# Patient Record
Sex: FEMALE | Race: WHITE | NOT HISPANIC OR LATINO | Employment: UNEMPLOYED | URBAN - METROPOLITAN AREA
[De-identification: names, ages, dates, MRNs, and addresses within clinical notes are randomized per-mention and may not be internally consistent; named-entity substitution may affect disease eponyms.]

---

## 2017-03-08 ENCOUNTER — PATIENT MESSAGE (OUTPATIENT)
Dept: OPTOMETRY | Facility: CLINIC | Age: 42
End: 2017-03-08

## 2017-03-22 ENCOUNTER — OFFICE VISIT (OUTPATIENT)
Dept: OPTOMETRY | Facility: CLINIC | Age: 42
End: 2017-03-22
Payer: COMMERCIAL

## 2017-03-22 DIAGNOSIS — H52.13 MYOPIA, BILATERAL: Primary | ICD-10-CM

## 2017-03-22 PROCEDURE — 92014 COMPRE OPH EXAM EST PT 1/>: CPT | Mod: S$GLB,,, | Performed by: OPTOMETRIST

## 2017-03-22 PROCEDURE — 99999 PR PBB SHADOW E&M-EST. PATIENT-LVL II: CPT | Mod: PBBFAC,,, | Performed by: OPTOMETRIST

## 2017-03-22 PROCEDURE — 92015 DETERMINE REFRACTIVE STATE: CPT | Mod: S$GLB,,, | Performed by: OPTOMETRIST

## 2017-03-22 NOTE — PROGRESS NOTES
HPI     Adrienne Andrew is a/an 41 y.o. Female who returns  for continued eye   care.  She reports that she wears her glasses most of the time she does   have glasses SVL which she uses when she removes her lenses inn the   evening. She feels as if vision and comfort with Cl and glasses is still   good but needs a new prescription for her CL.Pt wishes no dilation today   if necessary she will schedule separate karl. for that.    (--)blurred vision  (--)Headaches  (--)diplopia  (--)flashes  (+)floaters only sometimes and stable for years  (--)pain  (--)Itching  (--)tearing  (--)burning  (--)Dryness  (--) OTC Drops  (--)Photophobia       Last edited by Jung Fuentes, OD on 3/22/2017 10:49 AM.     ROS     Positive for: Eyes (myopia)    Negative for: Constitutional, Gastrointestinal, Neurological, Skin,   Genitourinary, Musculoskeletal, HENT, Endocrine, Cardiovascular,   Respiratory, Psychiatric, Allergic/Imm, Heme/Lymph    Last edited by Jung Fuentes, OD on 3/22/2017 10:49 AM. (History)        Assessment /Plan     For exam results, see Encounter Report.    Myopia, bilateral  - same specs ok  -   Glasses Prescription (3/22/2017)       Sphere Cylinder   Right -1.25 Sphere   Left -1.25 Sphere       Type:  SVL    Expiration Date:  3/23/2018          -  Will change CL brand from Acuvue Oasys to Acuvue Marilu (8.4/14.0) to increase replacment compliance (she currently replaces AV Oasys on a monthly basis rather than every 2 weeks  - CLRx per below for 1 month disposal/replacement    -Advised against overnight wear, risks of overnight wear explained;     -Optive artificial tears for comfort prn;    -Optifree Puremoist solutions recommended    Contact Lens Prescription (3/22/2017)       Brand Base Curve Diameter Sphere   Right Acuvue Marilu 8.4 14.0 -1.25   Left Acuvue Marilu 8.4 14.0 -1.25       Expiration Date:  3/23/2018    Replacement:  Monthly    Solutions:  OptiFree PureMoist    Wearing Schedule:  Daily wear    Do not  sleep in contact lenses  Use Refresh Optive artificial tears with and without contact lenses for irritation/ rewetting/ dryness              Patient education; RTC in 1 year with DFE, sooner prn at [facility]

## 2017-03-22 NOTE — PATIENT INSTRUCTIONS
Recommendations for Contact Lens Wearers from the American Opt[employer] Association  1. Always wash your hands before handling contact lenses.    2. Carefully and regularly clean contact lenses, as directed by your optometrist. Rub the contact lenses with fingers and rinse thoroughly before soaking lenses overnight in sufficient multi-purpose solution to completely cover the lens.    3. Store lenses in the proper lens storage case and replace the case at a minimum of every three months. Clean the case after each use, and keep it open and dry between cleanings.    4. Use only products recommended by your optometrist to clean and disinfect your lenses. Saline solution and rewetting drops are not designed to disinfect lenses.    5. Only fresh solution should be used to clean and store contact lenses. Never re-use old solution. Contact lens solution must be changed according to the 's recommendations, even if the lenses are not used daily.    6. Always follow the recommended contact lens replacement schedule prescribed by your optometrist.    7. Remove contact lenses before swimming or entering a hot tub.    8. See your optometrist for your regularly scheduled contact lens and eye examination.    What You Need to Know About Contact Lens Hygiene & Compliance    Contact lenses are among the safest forms of vision correction when patients follow the proper care and wearing instructions provided by their eye doctor. However, when patients do not use lenses as directed, the consequences may be dangerous. In fact, contact lens wearers could be damaging their eyes by not using proper hygiene in caring for their lenses.    Contact lenses and the solutions used with them are medical devices and are regulated by the Food and Drug Administration, therefore, it is extremely important that patients maintain regular appointments to ensure they are receiving clinical guidance from their eye doctor based on individual eye  health needs.    Clean and safe handling of contact lenses is one of the most important measures contact lens wearers can take to protect their sight. Exercising optimal care and hygiene with contact lenses can keep the eyes healthy.    Do's and Don'ts      Get started off right with your contact lenses by going to a doctor who provides full-service care. Full-service care may include the following items: a thorough eye examination, an evaluation of your suitability for contact lens wear, the lenses, necessary lens care kits, individual instructions for wear and care, and follow-up visits over a specified time. The initial visit and examination can take an hour or longer. Here is a list of other specific do's and don'ts to lead you to successful wear.  Do:  Always wash your hands before handling contact lenses.    Carefully and regularly clean contact lenses, as directed by your optometrist. If recommended, rub the contact lenses with fingers and rinse thoroughly before soaking lenses overnight in sufficient multi-purpose solution to completely cover the lens.    Store lenses in the proper lens storage case and replace the case at a minimum of every three months. Clean the case after each use, and keep it open and dry between cleanings.    Only fresh solution should be used to clean and store contact lenses. Never Re-use old solution. Contact lens solution must be changed according to the 's recommendations, even if the lenses are not used daily.    Always follow the recommended contact lens replacement schedule prescribed by your optometrist.    Remove contact lenses before swimming or entering a hot tub.    Avoid tap water to wash or store contact lenses or lens cases.    See your optometrist for your regularly scheduled contact lens and eye examination.    Don't:  Use cream soaps. They can leave a film on your hands that can transfer to the lenses.    Use homemade saline solutions. Improper use of  homemade saline solutions has been linked with a potentially blinding condition among soft lens wearers.    Put contact lenses in your mouth or moisten them with saliva, which is full of bacteria and a potential source of infection.    Use tap water to wash or store contact lenses or lens cases.    Share lenses with others.    Use products not recommended by your optometrist to clean and disinfect your lenses. Saline solution and rewetting drops are not designed to disinfect lenses.    Contact Lenses and Cosmetics      Here are some tips to help you wear your contacts and your cosmetics safely and comfortably together:  Put on soft contact lenses before applying makeup.   Put on rigid gas-permeable (RGP) lenses after makeup is applied.   Avoid lash-extending mascara, which has fibers that can irritate the eyes, and waterproof mascara, which cannot be easily removed with water and may stain soft contact lenses.   Remove lenses before removing makeup.   Choose an oil-free moisturizer.   Dont use hand creams or lotions before handling contacts. They can leave a film on your lenses.   Use hairspray before putting on your contacts. If you use hairspray while you are wearing your contacts, close your eyes during spraying and for a few seconds afterwards.   Blink your eyes frequently while under a hair drier or blower to keep your eyes from getting too dry.   Keep false eyelash cement, nail polish and remover, perfume and cologne away from the lenses. They can damage the plastic.   Choose water-based, hypo-allergenic liquid foundations. Cream makeup may leave a film on your lenses.    Signs of Potential Problems    Problems with contact lens wear can be serious.   It is generally not difficult to wear contact lenses. Following your doctors advice and regular follow-up care will prevent most problems.  However, here is a list of some signs that things may not be going well. If you experience any of these, contact your  optometrist as soon as possible.  Blurred or fuzzy vision, especially of sudden onset.   Red, irritated eyes.   Uncomfortable lenses.   Pain in and around the eyes.    Warning for Consumers: Popular [facility] Eye Wear Accessory Can Permanently Damage Eyes!    People who buy and wear contact lenses without medical guidance and a valid prescription put themselves at risk for serious, even blinding eye infections.   The American Optometric Association (AOA) is warning consumers about the risks of wearing decorative contact lenses sold without proper medical evaluation from a doctor of optometry and without a prescription. These non-corrective lenses are easily accessible to consumers and are especially popular around [facility].  Decorative lenses, also referred to as plano lenses, are marketed and distributed directly to consumers through a variety of sources, including flea markets, the Internet, beauty salons and convenience stores. Consumers often find them at retail outlets where they are sold as fashion accessories.   Buying contact lenses without a prescription can pose serious risk to your sight or eye health, said Markel Roberson O.D., former  of the AOA Contact Lens and Cornea Section. Decorative lenses, like their vision-correcting counterparts, require precise fitting and careful follow-up care. Consumers purchasing these lenses from untrained individuals may receive poorly fitted or demo lenses and little to no instruction in proper lens care and cleaning.   People who buy and wear contact lenses without medical guidance and a valid prescription put themselves at risk for serious, even blinding eye infections. A proper medical evaluation, ensures that the patient is an appropriate candidate for contact lens wear, that the lenses are properly fitted and that the patient is able to safely care for their lenses.  While consumer education is important, it is equally imperative to ensure that laws  are in place so that only people who are trained in the proper fitting and appropriate use of contacts are able to provide them to patients, said Dr. Roberson. This is a serious public health issue, especially for adolescents and young adults, he added.  Consumers and retailers should understand that decorative lenses, like the contact lenses intended for correcting vision, present serious risks to eye health if they are distributed without the appropriate involvement of a qualified eye care professional, added Dr. Roberson.  Other risks associated with use of decorative contact lenses include conjunctivitis, swelling, allergic reactions and corneal abrasion due to poor lens fit. Other problems may include reduced vision, glare, and other general eye and vision impairments.      Courtesy of The American Optometric Association            ADULT VISION  19 to 40 Years of Age    Most adults, aged 19 to 40, enjoy healthy eyes and good vision. The most common eye and vision problems experienced by people in this age group are due to visual stress and eye injuries. By taking proper steps to maintain a healthy lifestyle and protect your eyes from stress and injury, you can avoid many eye and vision problems.  Good vision is important as you pursue a college degree, begin your career, or perhaps start and raise a family. Here are some things you can do to help maintain healthy eyes and good vision:  Eat Healthy -- As part of a healthful diet, eat five servings of fruits and vegetables each day. Choose foods rich in antioxidants like leafy, green vegetables and fish.     Don't Smoke -- Smoking exposes your eyes to high levels of noxious chemicals and increases the risk for developing age-related macular degeneration (AMD) and cataracts.    Get Regular Exercise -- Exercise improves blood circulation, increases oxygen levels to the eyes and aids in the removal of toxins.    Wear Sunglasses -- Protect your eyes from harmful  ultraviolet (UV) rays when outdoors. Choose sunglasses with UVA and UVB protection, to block both forms of ultraviolet rays.     Get Periodic Eye Examinations -- Although vision generally remains stable during these years, some problems may develop without any obvious signs or symptoms. The best way to protect your vision is through regularly scheduled professional eye examinations.  The American Optometric Association recommends that adults aged 19 to 40 receive an eye exam at least every two years. If you are at risk for eye problems due to a family history of eye disease, diabetes, high blood pressure or past vision problems, your doctor of optometry may recommend more frequent exams. In between examinations, if you notice a change in your vision, contact your doctor. Detecting and treating problems early can help maintain good vision for the rest of your life.    Dealing with Visual Stress at School or on the Job  Eyestrain is a common occurrence in today's visually demanding world. A typical college schedule or office workday involves spending long hours reading, working at a desk, or staring at a computer. A poorly designed study or work environment, with elements such as improper lighting, uncomfortable seating, incorrect viewing angles and improper reading or working distances can add to the visual stress. As the day progresses, the eyes begin to fatigue and eyestrain and discomfort can develop.      A poorly designed study or work environment, with elements such as improper lighting, uncomfortable seating, incorrect viewing angles and improper reading or working distances can add to the visual stress.   The following are several key signs and symptoms of eyestrain:  Sore or tired eyes   Itching or burning sensations in the eyes   Sensitivity to light   Dry or watery eyes   Headaches   Difficulty focusing  Here are some simple steps you can take to minimize eyestrain, particularly during computer  work:  Workplace Adjustments  Position the top of your computer monitor below eye level so you look slightly downward when viewing the screen. This will help minimize strain on the eyes and the neck. If you are typing from copy, position the text at the same level as the screen. Adjust the screen brightness so it is most comfortable for you. Avoid glare on the computer screen by adjusting window curtains or blinds, repositioning the monitor, or using a glare reduction filter.    Proper Lighting  Examine the lighting in your work area. Overhead lights can be harsh and often are brighter than necessary. Consider turning some of the lights off for a more comfortable lighting situation. Use an adjustable shaded lamp to provide specific task lighting as needed.    Rest Breaks  Throughout the day, give your eyes a chance to rest. Take several minutes every hour to look away from the computer and allow your eyes to re-adjust. Consider standing up and walking around or doing alternate tasks that do not require extensive near focusing. Blink often to refresh the eyes and use artificial tear solutions, if necessary.    Posture  When seated at a desk, make sure your feet are flat on the floor. Use a chair that is adjustable and provides adequate support for your back. When working at a computer, your arms should form a 90 degree angle at the elbows and your hands should be tilted up slightly to allow your fingers to travel freely over the keyboard.  Making these simple adjustments to your study or work area can pay big dividends in terms of preventing or reducing eyestrain. If you continue to experience eye-related symptoms, you may have a vision problem requiring treatment. Ask your optometrist.      Ensuring Eye Safety at Work, Home or Play  The National [place] for Occupational Safety and Health reports about 2,000 [place] workers sustain job-related eye injuries that require medical treatment each day. But more injuries to  the eye actually result from use or misuse of products at home rather than on the job. Nearly 60 percent of all product-related eye injuries occur in and around the home, according to [employer] [facility] [place].  Any injury to the eye has the potential for causing some vision loss or even blindness. Fortunately, most eye injuries can be prevented with the use of proper eye protection. Prevention involves being aware of the common causes of injury and knowing how to protect your eyes-at home, at work and at play.  At Work      Proper eye protection can help to lessen or prevent serious eye injuries.   Eye injuries occurring at work, whether in a factory, on a construction site, on a farm, or in a laboratory, can result from chemical burns, foreign objects in the eye and cuts and scrapes of the cornea. Common causes of injuries include  splashes with chemicals, grease and oil   burns from steam   ultraviolet or infrared radiation exposure; and flying wood or metal chips  Not all forms of safety eyewear provide the same level of protection from flying objects, chemical splashes or radiation exposure. Be sure to wear the appropriate protection for the type of eye hazards in your workplace.              At Home  Using common sense can help protect the eyes at home. Following 's instructions and safety warnings will help prevent many household product-related eye injuries.  Wearing eye protection while performing certain household activities can prevent eye injuries. Some activities include:  Cleaning the oven or using other strong household chemicals   Chopping wood or doing woodworking   Using motorized equipment or power tools like lawn trimmers and electric drills   Jump-starting a car battery  Non-prescription safety goggles are sold at many home building stores and hardware stores. If you wear prescription glasses, ask your optometrist to make a recommendation on appropriate safety eyewear for household  tasks.  At Play  Sprained ankles, skinned knees, and bruises are common occurrences when participating in sports. Unfortunately, so are injuries to the eye.  Regular eyeglasses and contact lenses do not offer adequate protection from sports-related eye injuries. Special eye protection is needed for basketball, football, hockey, baseball and racket sports. Choose the right goggles or protective eyewear for your sport. Your optometrist can advise you on the appropriate eye protection.      Protecting Your Eyes from the Sun  Even on an overcast day, harmful ultraviolet (UV) rays can damage both the skin and the surface of the eye. Over time, unprotected exposure to the sun can increase the risk of certain types of cataracts and cancers of the eyelids. UV, as well as blue light, has the potential to damage the retina, the light-sensitive lining at the back of the eye, which could lead to significant loss of vision. UV damage is cumulative, so it's never too late to begin protecting your eyes from the sun's harmful rays.  The following tips can help prevent eye damage from exposure to UV radiation:  Wear a wide-brimmed hat or cap. It can block up to half of the UV radiation, reducing the amount that may enter from above or around sunglasses.   Wear sunglasses any time your eyes are exposed to UV radiation, even on cloudy days and during winter months.   Look for quality sunglasses that offer good protection. Sunglasses should block out 99 to 100 percent of both UVA and UBB radiation and screen out 75 to 90 percent of visible light.    Courtesy of The American Optometric Association        Open Your Eyes to Healthy Eating Habits  NUTRITION TIPS FOR YOUR EYE SIGHT  Doctors of optometry see millions of patients a year and are the primary providers of eye and vision care in [place]. This month, in celebration of national Save Your Vision Month, the American Optometric Association (AOA), [employer] and [employer] are  educating Americans on the many preventative actions they can take to protect their sight, including eating  right.    More than 43 million Americans suffer from cataracts or age-related macular degeneration (AMD), the two leading causes of vision loss and blindness.  Research indicates that there is a strong correlation between good nutrition and the prevention of these age-related eye diseases. Eating foods rich in key nutrients - antioxidants lutein and zeaxanthin, essential fatty acids, vitamins C and E and the mineral zinc - can help protect eyesight and vision.    Fast Facts   In a recent survey conducted by the AOA, nearly three-fourths (72%) of respondents  age 55 and older began noticing changes in their vision between the ages of 40  and 45.   To cope with vision loss or various eye problems, less than one-third (29%) of  respondents are increasing their nutrient intake for healthy eyes.   Many Americans (48%) still believe that carrots are the best food for eye health,  when, in fact, spinach and other dark leafy greens are the healthiest foods for  the eyes because they naturally contain large amounts of lutein and zeaxanthin.   In order to maintain healthy eyes, studies show that 10 mg of lutein should be  consumed each day or one cup of cooked spinach four times a week.   More than 50% of Americans do not take in the recommended dosage of vitamin C  per day.   One cup (8 fl oz) of orange juice per day contains 81.6 mg/serving of vitamin C,  more than enough to help offset some eye diseases.  Visit www.AOA.org for additional information, or for vision-friendly recipes      Nutrition for Healthy Eyes  By Ricki Gee OD  Research suggests that antioxidants and other important nutrients may reduce your risk of cataracts and macular degeneration. Specific antioxidants can have additional benefits as well; for example, vitamin A protects against blindness, and vitamin C may play a role in preventing  or alleviating glaucoma.  Omega-3 essential fatty acids appear to help the eye in a variety of ways, from alleviating symptoms of dry eye syndrome to guarding against macular damage.  Eye Benefits of Vitamins and Micronutrients    A healthy diet for your eyes should include plenty of colorful fruits and vegetables.   The following vitamins, minerals and other nutrients have been shown to be essential for good vision and may protect your eyes from sight-robbing conditions and diseases.  Incorporating the following foods in your diet will help you get the Recommended Dietary Allowance (RDA) of these important eye nutrients. Established by the [place] of Medicine (National Academy of Sciences), the RDA is the average daily dietary intake level of a nutrient sufficient to meet the requirements of nearly all healthy individuals in a specific life stage and gender group.  While the RDA is a useful reference, some eye care practitioners recommend higher daily intakes of certain nutrients for people at risk for eye problems.  (In the following list, mg = milligram; mcg = microgram (1/1000 of a mg) and IU = International Unit.)  Beta-carotene  Eye benefits of beta-carotene: When taken in combination with zinc and vitamins C and E, beta-carotene may reduce the progression of macular degeneration.   Food sources: Carrots, sweet potatoes, spinach, kale, butternut squash.   RDA: None (most supplements contain 5,000 to 25,000 IU).  Bioflavonoids (Flavonoids)  Eye benefits of bioflavonoids: May protect against cataracts and macular degeneration.   Food sources: Tea, red wine, citrus fruits, bilberries, blueberries, cherries, legumes, soy products.   RDA: None.  More Info   Learn more about omega-3s and save $2 on [employer]ra[employer]ears Nutrition gels   Moderate to severe dry eyes? Find out if Retaine MGD is right for you   Learn how Optometry Giving Sight helps 670 million people to see again  Lutein and Zeaxanthin  Eye benefits of lutein  and zeaxanthin: May prevent cataracts and macular degeneration.   Food sources: Spinach, kale, turnip greens, aarti greens, squash.   RDA: None.      This infographic shows which nutrients you need for good eye health as you age. Please click here for the full image. (Image: [employer])   Omega-3 Fatty Acids  Eye benefits of omega-3 fatty acids: May help prevent macular degeneration (AMD) and dry eyes.   Food sources: Cold-water fish such as salmon, mackerel and herring; fish oil supplements, freshly ground flaxseeds, walnuts.   RDA: None; but for cardiovascular benefits, the American Heart Association recommends approximately 1,000 mg daily.  Selenium  Eye benefits of selenium: When combined with carotenoids and vitamins C and E, may reduce risk of advanced AMD.   Food sources: Seafood (shrimp, crab, salmon, halibut), [place] nuts, enriched noodles, brown rice.   RDA: 55 mcg for teens and adults (60 mcg for women during pregnancy and 70 mcg when breast-feeding).  Vitamin A  Eye benefits of vitamin A: May protect against night blindness and dry eyes.   Food sources: Beef or chicken liver; eggs, butter, milk.   RDA: 3,000 IU for men; 2,333 IU for women (2,567 IU during pregnancy and 4,333 IU when breast-feeding).  Vitamin C  Eye benefits of vitamin C: May reduce the risk of cataracts and macular degeneration.   Food sources: Sweet peppers (red or green), kale, strawberries, broccoli, oranges, cantaloupe.   RDA: 90 mg for men; 70 mg for women (85 mg during pregnancy and 120 mg when breast-feeding).  Vitamin D  Eye benefits of vitamin D: May reduce the risk of macular degeneration.   Food sources: [place], sardines, mackerel, milk; orange juice fortified with vitamin D.   RDA: None, but the American Academy of Pediatrics recommends 400 IU per day for infants, children and adolescents, and many experts recommend higher daily intakes for adults.   The best source of vitamin D is exposure to sunlight. Ultraviolet  radiation from the sun stimulates production of vitamin D in human skin, and just a few minutes of exposure to sunlight each day (without sunscreen) will insure your body is producing adequate amounts of vitamin D.  Vitamin E  Eye benefits of vitamin E: When combined with carotenoids and vitamin C, may reduce the risk of advanced AMD.   Food sources: Almonds, sunflower seeds, hazelnuts.   RDA: 15 mg for teens and adults (15 mg for women during pregnancy and 19 mg when breast-feeding).  Zinc  Eye benefits of zinc: Helps vitamin A reduce the risk of night blindness; may play a role in reducing risk of advanced AMD.   Food sources: Oysters, beef, Dungeness crab, turkey (dark meat).   RDA: 11 mg for men; 8 mg for women (11 mg during pregnancy and 12 mg when breast-feeding).  In general, it's best to obtain most nutrients through a healthy diet, including at least two servings of fish per week and plenty of colorful fruits and vegetables.  If you plan to begin a regimen of eye vitamins, be sure to discuss this with your optometrist or ophthalmologist. Taking too much of certain vision supplements can cause problems, especially if you are taking prescription medications for health problems.  Bon appétit!   Home » Nutrition » Overview     About the Author: Ricki Gee OD, is  of [employer]. 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 His special interests include contact lenses, nutrition and preventive vision care.      Protecting Your Eyes from Solar Radiation  The sun supports all life on our planet, but its life-giving rays also pose dangers.  The suns primary danger is in the form of Ultraviolet (UV) radiation. UV radiation is a component of solar radiation, but it can also be given off by artificial sources like welding machines, tanning beds and lasers.  Most are aware of the harm UV radiation can do to the  skin, but many may not realize that exposure to UV radiation can harm the eyes or that other components of solar radiation can also affect vision.  There are three types of UV radiation: UV-C is absorbed by the ozone layer and does not present any threat; UV-A and UV-B radiation can have adverse long- and short-term effects on the eyes and vision.  If your eyes are exposed to excessive amounts of UV radiation over a short period of time, you are likely to experience an effect called photokeratitis.    Like a sunburn of the eye, photokeratitis may be painful and include symptoms such as red eyes, a foreign body sensation or gritty feeling in the eyes, extreme sensitivity to light and excessive tearing. Fortunately, this is usually temporary and rarely causes permanent damage to the eyes.  Long-term exposure to UV radiation, however, can be more serious. Scientific studies and research have shown that exposure to small amounts of UV radiation over a period of many years increases the chance of developing a cataract and may cause damage to the retina, a nerve-rich lining of the eye that is used for seeing. Additionally, chronic exposure to shorter wavelength visible light (i.e. blue and violet light) may also be harmful to the retina.  The longer the eyes are exposed to solar radiation, the greater the risk of developing later in life such conditions as cataracts or macular degeneration. Since it is not clear how much exposure to solar radiation will cause damage, the AOA recommends wearing quality sunglasses that offer UV protection and wearing a hat or cap with a wide brim whenever you spend time outdoors.    To provide adequate protection for your eyes, sunglasses should:  block out 99 to 100 percent of both UV-A and UV-B radiation;   screen out 75 to 90 percent of visible light;   be perfectly matched in color and free of distortion and imperfection; and   have lenses that are gray for proper color  "recognition.  The lenses in sunglasses should be made from polycarbonate or Trivex® material if you participate in potentially eye-hazardous work or sports. These lenses provide the most impact resistance.  If you spend a lot of time outdoors in bright sunlight, wrap around frames can provide additional protection from the harmful solar radiation.  Dont forget protection for children and teenagers. They typically spend more time in the sun than adults.          Eye Benefits of Omega-3 Fatty Acids  By Ricki Gee, OD  You may find it hard to believe that fat is essential to your health, but it's true. Without fat, our bodies can't function properly. And without the proper kinds of fats in our diet, our eye health also may suffer.  Fatty acids are the "building blocks" of fat. These important nutrients are critical for the normal production and functioning of cells, muscles, nerves and organs. Fatty acids also are required for the production of hormone-like compounds that help regulate blood pressure, heart rate and blood clotting.  Some fatty acids -- called essential fatty acids (EFAs) -- are necessary to our diet, because our body can't produce them. To stay healthy, we must obtain these fatty acids from our food.  Two types of EFAs are omega-3 fatty acids and omega-6 fatty acids. Studies have found that omega-3 fatty acids, in particular, may benefit eye health.  Omega-3 fatty acids include docosahexaenoic acid (DHA), eicoapentaenoic acid (EPA) and alpha-linolenic acid (ALA).  Omega-3 Fatty Acids and Infant Vision Development  A number of clinical studies have shown omega-3 fatty acids are essential for normal infant vision development.    Grilled salmon is an excellent natural source of omega-3 fatty acids.   DHA and other omega-3 fatty acids are found in maternal breast milk and also are added to some supplemented infant formulas. Omega-3 supplemental formulas appear to stimulate vision development in " "infants.  According to an analysis of several studies conducted by researchers at [employer] and published in the journal Pediatrics, the authors found that healthy pre-term infants who were fed DHA-supplemented formula showed significantly better visual acuity at 2 and 4 months of age, compared with similar pre-term infants who were fed formula that did not contain the omega-3 supplement.  Adequate amounts of DHA and other omega-3 fatty acids in the diet of pregnant women also appear to be important in normal infant vision development.  More Info   Learn more about omega-3s and save $2 on TheraTears Nutrition gels   Moderate to severe dry eyes? Find out if Retaine MGD is right for you   Learn how Optometry Giving Sight helps 670 million people to see again  In a study published in the American Journal of Clinical Nutrition, [place] researchers found that infant girls whose mothers received DHA supplements from their fourth month of pregnancy until delivery were less likely to have below-average visual acuity at 2 months of age than infant girls whose mothers did not receive the omega-3 supplements.  Adult Eye Benefits of Omega-3 Fatty Acids  Several studies suggest omega-3 fatty acids may help protect adult eyes from macular degeneration and dry eye syndrome. Essential fatty acids also may help proper drainage of intraocular fluid from the eye, decreasing the risk of high eye pressure and glaucoma.  In a large  study published in 2008, participants who ate oily fish (an excellent source of DHA and EPA omega-3 fatty acids) at least once per week had half the risk of developing neovascular ("wet") macular degeneration, compared with those who ate fish less than once per week.    Eye Nutrition News   Omega-3 Supplements Relieve Dry Eye Symptoms Among Computer Users, Study Finds  February 2015 -- Taking daily omega-3 fatty acid supplements could help relieve your dry eyes associated with " computer use, according to a study.    The study participants were 456 computer users in [facility] who complained of dry eyes and who used a computer for more than three hours a day for at least one year.  Subjects in one group (220) were given two capsules of omega-3 fatty acids, each containing 180mg EPA and 120mg DHA, to supplement their daily diet; subjects in the other group (236) were given two capsules of a placebo containing olive oil for daily use. Each group took the daily supplements for three months.  At the end of the three-month trial, a survey of the participants revealed dry eye symptoms diminished after dietary intervention with omega-3 fatty acids, and use of the omega-3 supplements also reduced abnormal tear evaporation. The omega-3 supplements also increased the density of conjunctival goblet cells on the surface of the eye. These cells secrete substances that lubricate the eye during blinks, stabilize the tear film and reduce dryness.  The study authors concluded that orally administered omega-3 fatty acid supplements can alleviate dry eye symptoms, slow tear evaporation, and improve signs of a healthy eye surface in patients suffering from dry eyes related to computer vision syndrome.  A report of this study was published online this month by the journal Contact Lens & Anterior Eye. -- G.H.  Also, a 2009 National Eye [place] (NEI) study that used data obtained from the Age-Related Eye Disease Study (AREDS) found participants who reported the highest level of omega-3 fatty acids in their diet were 30 percent less likely than their peers to develop macular degeneration during a 12-year period.  In May 2013, the NEI published results of a large follow-up to the original AREDS study called AREDS2. Among other things, AREDS2 investigated whether daily supplementation of omega-3 fatty acids, along with the original AREDS nutritional supplement or modifications of that formula -- which contained  beta-carotene, vitamin C, vitamin E, zinc and copper -- would further reduce the risk of AMD progression among study participants with early signs of macular degeneration. (The original AREDS supplement reduced the risk of AMD progression by 25 percent among a similar population.)  A somewhat surprising result of AREDS2 was that participants who supplemented their diet with 1,000 mg of omega-3s daily (350 mg DHA and 650 mg EPA) did not show any reduction of their risk for progressive AMD over the 5-year duration of the study, compared with participants who did not receive omega-3 supplements.  Possible explanations for these different findings from AREDS and AREDS2 data may be that omega-3 fatty acids are more effective at reducing the risk of age-related eye diseases when obtained via food sources rather than from nutritional supplements, and that a healthy diet containing plenty of omega-3s along with other important nutrients consumed over a person's lifetime is more protective than taking nutritional supplements for a 5-year period.  Omega-3 fatty acids also have been found to reduce the risk of dry eyes. In a study of more than 32,000 women between the ages of 45 and 84, those with the highest ratio of (potentially harmful) omega-6 fatty acids to beneficial omega-3 fatty acids in their diet (15-to-1) had a significantly greater risk of dry eye syndrome, compared with the women with the lowest ratio (less than 4-to-1). The study also found that the women who ate at least two servings of tuna per week had significantly less risk of dry eye than women who ate one or fewer servings per week.  Omega-3 fatty acids also may help treat dry eyes. In a recent study of dry eyes induced in mice, topical application of the omega-3 fatty acid ALA led to a significant decrease in dry eye signs and inflammation associated with dry eye.  [place]-3 Foods  While both omega-3 and omega-6 fatty acids are important to health, the  "balance of these two types of EFAs in our diet is extremely important. Most experts believe the ratio of omega-6 to omega-3 fatty acids in a healthy diet should be 4-to-1 or lower.  Many eye doctors recommend a diet high in omega-3 fatty acids to reduce the risk of eye problems.   Unfortunately, the typical American diet, characterized by significant amounts of meat and processed foods, tends to contain 10 to 30 times more omega-6 than omega-3 fatty acids. This imbalance of omega-6 ("bad") fatty acids to omega-3 ("good") fatty acids appears to be a contributing cause of a number of serious health problems, including heart disease, cancer, asthma, arthritis and depression.  One of the best steps you can take to improve your diet is to eat more foods that are rich in omega-3 fatty acids and fewer that are high in omega-6 fatty acids.  The best food sources of beneficial omega-3 fatty acids are cold-water fish, which are high in both DHA and EPA. Examples include sardines, herring, salmon and tuna. Wild-caught varieties usually are better than "farmed" fish, which typically are subject to higher levels of pollutants and chemicals.  The American Heart Association recommends a minimum of two servings of cold-water fish weekly to reduce the risk of cardiovascular disease, and many eye doctors likewise recommend a diet high in omega-3 fatty acids to reduce the risk of eye problems.  If you aren't a fish lover, another way to make sure your diet contains enough omega-3s it to take fish oil supplements. These are available in capsule and liquid form, and many varieties feature a "non-fishy" taste.  Other good sources of omega-3 fatty acids include flaxseeds, flaxseed oil, walnuts and dark green leafy vegetables. However, your body cannot process the ALA omega-3 fatty acids from these vegetarian sources as easily as the DHA and EPA omega-3 fatty acids found in fish.  To reduce your intake of omega-6s, avoid fried and highly " "processed foods. Many cooking oils, including sunflower oil and corn oil, are very high in omega-6 fatty acids. High cooking temperatures also create harmful trans-fatty acids, or "trans-fats."  Trans fats interfere with the body's absorption of beneficial omega-3 fatty acids and may contribute to a number of serious diseases, including cancer, heart disease, atherosclerosis (hardening of the arteries), high blood pressure, diabetes, obesity, arthritis and immune system disorders.  Currently, there is no Recommended Dietary Allowance (RDA) for omega-3 fatty acids. But, according to the American Heart Association, research suggests daily intakes of DHA and EPA (combined) ranging from 500 milligrams (0.5 gram) to 1.8 grams (either from fish or fish oil supplements) significantly reduces cardiac risks. For ALA, daily intakes of 1.5 to 3 grams (g) seem to be beneficial.  Foods Containing Omega-3 Essential Fatty Acids   Food DHA and EPA Omega-3s (total), grams   [place], [place] (half fillet, grilled) 3.89   Mackerel, Pacific (1 fillet, grilled) 3.25   Sardine oil (1 tablespoon) 2.83   [place], [place] (half fillet, grilled) 2.68   Cod liver oil (1 tablespoon) 2.43   [place], pink (half fillet, grilled)  1.60   Herring oil (1 tablespoon) 1.43   Sardines, canned in oil (approx. 3 ounces) 0.90   White tuna, canned in water (approx. 3 ounces) 0.73   Source: National Agriculture Library, U.S. Dept. of Agriculture   For a more nutritious diet and potentially better eye health, try these simple changes:  9. Replace cooking oils that are high in omega-6 fatty acids with olive oil, which has significantly lower levels of omega-6 fatty acids.  10. Eat plenty of fish, fruits and vegetables.  11. Avoid hydrogenated oils (found in many snack foods) and margarine.  12. Avoid fried foods and foods containing trans fats.  13. Limit your consumption of red meat.  Choosing a healthy diet that includes a variety of foods with plenty of " omega-3 fatty acids and limiting your intake of potentially harmful omega-6 fatty acids will significantly increase your odds of a lifetime of good vision and vibrant health.   Home » Nutrition » Omega-3 Fatty Acids     About the Author: Ricki Gee OD, is  of [employer]. 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 His special interests include contact lenses, nutrition and preventive vision care

## 2017-11-13 ENCOUNTER — PATIENT OUTREACH (OUTPATIENT)
Dept: INTERNAL MEDICINE | Facility: CLINIC | Age: 42
End: 2017-11-13

## 2017-11-13 NOTE — PROGRESS NOTES
Patient was contacted to scheduled visit with Clare Beltrán MD. Attempt unsuccessful,will follow up with patient at a later time.

## 2017-11-13 NOTE — PROGRESS NOTES
Dear Adrienne Andrew,     [facility] is committed to your overall health.  Our records indicate that you are due for an annual checkup with your primary care provider,  Dr. Clare Beltrán MD.  Please call [phone] to schedule a routine physical exam.  You can also schedule your appointment via your [facility]. You may also be due for the following test and/or procedures:    Health Maintenance Due   Topic Date Due    Pap Smear with HPV Cotest  08/21/2016    Influenza Vaccine  08/01/2017    Mammogram  11/09/2017       You are more than welcome to schedule your visit with your PCP using our [facility].      If you have had any of the above done at another facility, please let us know by calling [phone]; so that we can update your record.  We will add these results to your chart if you fax them to the fax number listed below.  If you have any questions, please call [phone].    Thanks,       Additional Information  If you have questions, you can email [email or web address] or call [phone]  to talk to our [facility] staff. Remember, [facility] is NOT to be used for urgent needs. For medical emergencies, dial 911.

## 2018-03-20 ENCOUNTER — OFFICE VISIT (OUTPATIENT)
Dept: SPORTS MEDICINE | Facility: CLINIC | Age: 43
End: 2018-03-20
Payer: COMMERCIAL

## 2018-03-20 VITALS
HEIGHT: 61 IN | HEART RATE: 69 BPM | DIASTOLIC BLOOD PRESSURE: 79 MMHG | BODY MASS INDEX: 23.41 KG/M2 | WEIGHT: 124 LBS | SYSTOLIC BLOOD PRESSURE: 119 MMHG

## 2018-03-20 DIAGNOSIS — M54.50 RIGHT-SIDED LOW BACK PAIN WITHOUT SCIATICA, UNSPECIFIED CHRONICITY: Primary | ICD-10-CM

## 2018-03-20 PROCEDURE — 99213 OFFICE O/P EST LOW 20 MIN: CPT | Mod: 25,S$GLB,, | Performed by: PHYSICIAN ASSISTANT

## 2018-03-20 PROCEDURE — 97110 THERAPEUTIC EXERCISES: CPT | Mod: S$GLB,,, | Performed by: PHYSICIAN ASSISTANT

## 2018-03-20 PROCEDURE — 99999 PR PBB SHADOW E&M-EST. PATIENT-LVL III: CPT | Mod: PBBFAC,,, | Performed by: PHYSICIAN ASSISTANT

## 2018-03-20 RX ORDER — DICLOFENAC SODIUM 10 MG/G
2 GEL TOPICAL 4 TIMES DAILY
Qty: 1 TUBE | Refills: 2 | Status: SHIPPED | OUTPATIENT
Start: 2018-03-20 | End: 2018-07-03

## 2018-03-20 NOTE — PROGRESS NOTES
Subjective:          Chief Complaint: Adrienne Andrew is a 42 y.o. female who had concerns including Back Pain.    Adrienne Andrew presents for lower back pain. The pain started 3-4 months ago in [employer] with no clear MACK and is becoming progressively worse over the past few weeks. She reports pain with back squats, deadlifts, and olympic lifts located on the right side of her lower back. She reports that the pain is a 4 /10 aching pain today and not responding adequately to conservative measures which have included activity modifications, rest, and oral medication. Is affecting ADLs and limiting desired level of activity. Denies numbness, tingling, radiation, and inability to bear weight.  Pain is 8 /10 at its worst    Worse with weightlifting  Better with rest.   Nocturnal symptoms: (--)    No previous surgeries            Review of Systems   Constitution: Negative for chills and fever.   HENT: Negative for congestion and sore throat.    Eyes: Negative for discharge and double vision.   Cardiovascular: Negative for chest pain, palpitations and syncope.   Respiratory: Negative for cough and shortness of breath.    Endocrine: Negative for cold intolerance and heat intolerance.   Skin: Negative for dry skin and rash.   Musculoskeletal: Positive for back pain. Negative for falls, joint pain, joint swelling, muscle cramps, muscle weakness, myalgias, neck pain and stiffness.   Gastrointestinal: Negative for abdominal pain, nausea and vomiting.   Neurological: Negative for focal weakness, numbness and paresthesias.       Pain Related Questions  Over the past 3 days, what was your average pain during activity? (I.e. running, jogging, walking, climbing stairs, getting dressed, ect.): 8  Over the past 3 days, what was your highest pain level?: 8  Over the past 3 days, what was your lowest pain level? : 4    Other  How many nights a week are you awakened by your affected body part?: 0  Was the patient's  HEIGHT measured or patient reported?: Patient Reported  Was the patient's WEIGHT measured or patient reported?: Measured      Objective:        General: Adrienne is well-developed, well-nourished, appears stated age, in no acute distress, alert and oriented to time, place and person.     General    Vitals reviewed.  Constitutional: She is oriented to person, place, and time. She appears well-developed and well-nourished. No distress.   Eyes: Conjunctivae and EOM are normal. Pupils are equal, round, and reactive to light.   Neck: Normal range of motion. Neck supple. No JVD present.   Cardiovascular: Normal heart sounds and intact distal pulses.    No murmur heard.  Pulmonary/Chest: Effort normal and breath sounds normal. No respiratory distress.   Abdominal: Soft. Bowel sounds are normal. She exhibits no distension. There is no tenderness.   Neurological: She is alert and oriented to person, place, and time. Coordination normal.   Psychiatric: She has a normal mood and affect. Her behavior is normal. Judgment and thought content normal.     General Musculoskeletal Exam   Gait: normal       Right Knee Exam     Range of Motion   Extension: 0   Flexion: 130     Other   Sensation: normal    Left Knee Exam     Range of Motion   Extension: 0   Flexion: 130     Other   Sensation: normalBack (L-Spine & T-Spine) / Neck (C-Spine) Exam     Back (L-Spine & T-Spine) Range of Motion   Extension: normal   Flexion: normal     Other She has no scoliosis .  Spinal Kyphosis:  Absent  Spinal Lordosis:  Absent    Comments:  +bridge test, right side  + mian test, right side  -SLR, bilateral  +TTP right side lower back  -midline tenderness      Muscle Strength   Right Lower Extremity   Hip Abduction: 5/5   Hip Flexion: 5/5   Hip Extensors: 5/5  Quadriceps:  5/5   Hamstrin/5   Anterior tibial:  5/5/5  Left Lower Extremity   Hip Abduction: 5/5   Hip Flexion: 5/5   Hip Extensors: 5/5  Quadriceps:  5/5   Hamstrin/5   Anterior tibial:   5/5/5     Vascular Exam     Right Pulses  Dorsalis Pedis:      2+  Posterior Tibial:      2+        Left Pulses  Dorsalis Pedis:      2+  Posterior Tibial:      2+        Edema  Right Lower Leg: absent  Left Lower Leg: absent              Assessment:       Encounter Diagnosis   Name Primary?    Right-sided low back pain without sciatica, unspecified chronicity Yes          Plan:       1. HEP 20887 - Ck Brown PA-C, instructed and demonstrated a Core/Lower back strengthening HEP. The patient then demonstrated understanding of exercises and proper technique. This program was performed for 15 minutes.   2. Ice compress to the affected area 2-3x a day for 15-20 minutes as needed for pain management.  3. Voltaren gel as needed for pain management.  4. Consider referral to [facility] if symptoms do not improve.  5. RTC to see Ck Brown PA-C in 6 weeks for follow-up.    All of the patient's questions were answered and the patient will contact us if they have any questions or concerns in the interim.            Patient questionnaires may have been collected.

## 2018-07-03 ENCOUNTER — OFFICE VISIT (OUTPATIENT)
Dept: DERMATOLOGY | Facility: CLINIC | Age: 43
End: 2018-07-03
Payer: COMMERCIAL

## 2018-07-03 DIAGNOSIS — L21.9 SEBORRHEIC DERMATITIS: Primary | ICD-10-CM

## 2018-07-03 DIAGNOSIS — L81.4 LENTIGO: ICD-10-CM

## 2018-07-03 DIAGNOSIS — D22.9 MULTIPLE BENIGN NEVI: ICD-10-CM

## 2018-07-03 PROCEDURE — 99999 PR PBB SHADOW E&M-EST. PATIENT-LVL II: CPT | Mod: PBBFAC,,, | Performed by: DERMATOLOGY

## 2018-07-03 PROCEDURE — 99202 OFFICE O/P NEW SF 15 MIN: CPT | Mod: S$GLB,,, | Performed by: DERMATOLOGY

## 2018-07-03 RX ORDER — SULCONAZOLE NITRATE 10 MG/G
CREAM TOPICAL DAILY
Qty: 60 G | Refills: 3 | Status: SHIPPED | OUTPATIENT
Start: 2018-07-03 | End: 2019-12-09

## 2018-07-03 NOTE — PROGRESS NOTES
Subjective:       Patient ID:  Adrienne Andrew is a 42 y.o. female who presents for   Chief Complaint   Patient presents with    Spot     nose, x months, dry, scaly, tx OTC oils, creams & ointments (lotrimin)     41 yo F here for initial evaluation. Complains of redness and scaling in left alar crease x several months. Asymptomatic. Tried OTC creams, ointments, oils without much improvement. Also tried OTC Lotrimin which she found helped decrease the scaling. Also recently started having similar issues in right alar crease a few days ago. No personal history of skin cancer.        Spot  - Initial  Affected locations: nose  Duration: several months.  Signs / symptoms: dryness and scaling  Treatments tried: OTC oils, ointments & creams (lotrimin)        Review of Systems   Constitutional: Negative for fever and chills.   Skin: Negative for itching and recent sunburn.   Hematologic/Lymphatic: Does not bruise/bleed easily.        Objective:    Physical Exam   Constitutional: She appears well-developed and well-nourished. No distress.   Neurological: She is alert and oriented to person, place, and time. She is not disoriented.   Psychiatric: She has a normal mood and affect.   Skin:   Areas Examined (abnormalities noted in diagram):   Head / Face Inspection Performed  Neck Inspection Performed  Chest / Axilla Inspection Performed  Back Inspection Performed  RUE Inspected  LUE Inspection Performed                   Diagram Legend     Erythematous scaling macule/papule c/w actinic keratosis       Vascular papule c/w angioma      Pigmented verrucoid papule/plaque c/w seborrheic keratosis      Yellow umbilicated papule c/w sebaceous hyperplasia      Irregularly shaped tan macule c/w lentigo     1-2 mm smooth white papules consistent with Milia      Movable subcutaneous cyst with punctum c/w epidermal inclusion cyst      Subcutaneous movable cyst c/w pilar cyst      Firm pink to brown papule c/w dermatofibroma       Pedunculated fleshy papule(s) c/w skin tag(s)      Evenly pigmented macule c/w junctional nevus     Mildly variegated pigmented, slightly irregular-bordered macule c/w mildly atypical nevus      Flesh colored to evenly pigmented papule c/w intradermal nevus       Pink pearly papule/plaque c/w basal cell carcinoma      Erythematous hyperkeratotic cursted plaque c/w SCC      Surgical scar with no sign of skin cancer recurrence      Open and closed comedones      Inflammatory papules and pustules      Verrucoid papule consistent consistent with wart     Erythematous eczematous patches and plaques     Dystrophic onycholytic nail with subungual debris c/w onychomycosis     Umbilicated papule    Erythematous-base heme-crusted tan verrucoid plaque consistent with inflamed seborrheic keratosis     Erythematous Silvery Scaling Plaque c/w Psoriasis     See annotation      Assessment / Plan:        Seborrheic dermatitis vs perioral dermatitis - trial of antifungal cream - call if not better in 1 mo, will change to clindamycin lotion if not improved  -     sulconazole (EXELDERM) 1 % cream; Apply topically once daily. To rash around nose  Dispense: 60 g; Refill: 3    Multiple benign nevi  Discussed ABCDE's of nevi.  Monitor for new mole or moles that are becoming bigger, darker, irritated, or developing irregular borders. Brochure provided.    Lentigo  Patient instructed in importance in daily sun protection of at least spf 30. Sun avoidance and topical protection discussed.     Patient encouraged to wear hat for all outdoor exposure.     Also discussed sun protective clothing.                 Follow-up if symptoms worsen or fail to improve.

## 2018-07-06 ENCOUNTER — TELEPHONE (OUTPATIENT)
Dept: DERMATOLOGY | Facility: CLINIC | Age: 43
End: 2018-07-06

## 2018-07-06 NOTE — TELEPHONE ENCOUNTER
Spoke with pharmacy, informed that provider is out today, will be back on Monday and will let her know to change to something different.     ----- Message from Genny Mejia sent at 7/6/2018  9:44 AM CDT -----  Contact: [facility]  Pharmacy Calling    Reason for call: Pharmacy is out of prescribed medication, calling to find out if Dr can prescribe something different     Pharmacy Name: [facility]     Prescription Name: sulconazole (EXELDERM) 1 % cream    Phone Number: [phone]

## 2018-10-10 ENCOUNTER — HOSPITAL ENCOUNTER (OUTPATIENT)
Dept: RADIOLOGY | Facility: HOSPITAL | Age: 43
Discharge: HOME | End: 2018-10-10
Attending: INTERNAL MEDICINE
Payer: COMMERCIAL

## 2018-10-10 ENCOUNTER — OFFICE VISIT (OUTPATIENT)
Dept: INTERNAL MEDICINE | Facility: CLINIC | Age: 43
End: 2018-10-10
Payer: COMMERCIAL

## 2018-10-10 VITALS
WEIGHT: 124.56 LBS | DIASTOLIC BLOOD PRESSURE: 84 MMHG | BODY MASS INDEX: 23.52 KG/M2 | HEIGHT: 61 IN | SYSTOLIC BLOOD PRESSURE: 126 MMHG | HEART RATE: 65 BPM | OXYGEN SATURATION: 99 %

## 2018-10-10 DIAGNOSIS — D50.0 IRON DEFICIENCY ANEMIA DUE TO CHRONIC BLOOD LOSS: ICD-10-CM

## 2018-10-10 DIAGNOSIS — Z12.31 SCREENING MAMMOGRAM, ENCOUNTER FOR: ICD-10-CM

## 2018-10-10 DIAGNOSIS — Z13.29 SCREENING FOR THYROID DISORDER: ICD-10-CM

## 2018-10-10 DIAGNOSIS — Z13.1 SCREENING FOR DIABETES MELLITUS: ICD-10-CM

## 2018-10-10 DIAGNOSIS — Z13.220 SCREENING, LIPID: ICD-10-CM

## 2018-10-10 DIAGNOSIS — Z00.00 VISIT FOR ANNUAL HEALTH EXAMINATION: Primary | ICD-10-CM

## 2018-10-10 PROBLEM — D50.9 IRON DEFICIENCY ANEMIA: Status: ACTIVE | Noted: 2018-10-10

## 2018-10-10 PROCEDURE — 77067 SCR MAMMO BI INCL CAD: CPT | Mod: TC

## 2018-10-10 PROCEDURE — 99396 PREV VISIT EST AGE 40-64: CPT | Mod: S$GLB,,, | Performed by: INTERNAL MEDICINE

## 2018-10-10 PROCEDURE — 77067 SCR MAMMO BI INCL CAD: CPT | Mod: 26,,, | Performed by: RADIOLOGY

## 2018-10-10 PROCEDURE — 99999 PR PBB SHADOW E&M-EST. PATIENT-LVL IV: CPT | Mod: PBBFAC,,, | Performed by: INTERNAL MEDICINE

## 2018-10-10 NOTE — PROGRESS NOTES
INTERNAL MEDICINE INITIAL VISIT NOTE      CHIEF COMPLAINT     Chief Complaint   Patient presents with    Annual Exam       HPI     Adrienne Andrew is a 42 y.o.  female who presents with  MICHELLE, hx GI issues c neg w/u for diarrhea in the past and seen by GI but now resolved, here today to establish care.    Feeling well overall.  Denies abd pain.  Reports menses q month which she reports is often heavy.    Past Medical History:  Past Medical History:   Diagnosis Date    Anemia        Past Surgical History:  Past Surgical History:   Procedure Laterality Date    DILATION AND CURETTAGE OF UTERUS      WISDOM TOOTH EXTRACTION         Home Medications:  Prior to Admission medications    Medication Sig Start Date End Date Taking? Authorizing Provider   sulconazole (EXELDERM) 1 % cream Apply topically once daily. To rash around nose 7/3/18   Keri Nava MD       Allergies:  Review of patient's allergies indicates:  No Known Allergies    Family History:  Family History   Problem Relation Age of Onset    Hyperthyroidism Mother     No Known Problems Brother     No Known Problems Daughter     No Known Problems Son     No Known Problems Brother     No Known Problems Brother     No Known Problems Son     Hyperthyroidism Maternal Grandmother     Amblyopia Neg Hx     Blindness Neg Hx     Cancer Neg Hx     Diabetes Neg Hx     Cataracts Neg Hx     Glaucoma Neg Hx     Hypertension Neg Hx     Macular degeneration Neg Hx     Retinal detachment Neg Hx     Strabismus Neg Hx     Stroke Neg Hx     Thyroid disease Neg Hx     Celiac disease Neg Hx     Colon cancer Neg Hx     Crohn's disease Neg Hx        Social History:  Social History     Tobacco Use    Smoking status: Never Smoker    Smokeless tobacco: Never Used   Substance Use Topics    Alcohol use: Yes     Alcohol/week: 0.0 oz     Comment: socially    Drug use: No       Review of Systems:  Review of Systems   Constitutional: Negative for activity  "change and unexpected weight change.   HENT: Negative for hearing loss, rhinorrhea and trouble swallowing.    Eyes: Negative for discharge and visual disturbance.   Respiratory: Negative for chest tightness and wheezing.    Cardiovascular: Negative for chest pain and palpitations.   Gastrointestinal: Negative for blood in stool, constipation, diarrhea and vomiting.   Endocrine: Negative for polydipsia and polyuria.   Genitourinary: Positive for menstrual problem. Negative for difficulty urinating, dysuria and hematuria.   Musculoskeletal: Negative for arthralgias, joint swelling and neck pain.   Neurological: Negative for weakness and headaches.   Psychiatric/Behavioral: Negative for confusion and dysphoric mood.       Health Maintenance:   Immunizations:   Influenza declined  TDap 1/2015  prevnar at 67  Shingrix rec at 52    Cancer Screening:  PAP: due, will refer to gyn  Mammogram: rec now, will order  Colonoscopy:  rec at 52      PHYSICAL EXAM     /84 (BP Location: Left arm, Patient Position: Sitting, BP Method: Medium (Manual))   Pulse 65   Ht 5' 1" (1.549 m)   Wt 56.5 kg (124 lb 9 oz)   LMP 09/24/2018   SpO2 99%   BMI 23.54 kg/m²     GEN - A+OX4, NAD   HEENT - PERRL, EOMI, OP clear  Neck - No thyromegaly or cervical LAD. No thyroid masses felt.  CV - RRR, no m/r/g  Chest - CTAB, no wheezing, crackles, or rhonchi  Abd - S/NT/ND/+BS.   Ext - 2+BDP and radial pulses. No C/C/E.  LN - No LAD appreciated.  Skin - Normal color and texture, no rash, no skin lesions.      LABS         ASSESSMENT/PLAN     Adrienne Andrew is a 42 y.o. female with  Adrienne was seen today for annual exam.    Diagnoses and all orders for this visit:    Visit for annual health examination  History and physical exam completed.  Health maintenance reviewed as above.  -     Ambulatory referral to Gynecology    Iron deficiency anemia due to chronic blood loss  Likely due to menses, on Fe but unsure of dose  Will check labs c " Ferritin and adjust dose if needed  -     CBC auto differential; Future; Expected date: 10/10/2018  -     Iron and TIBC; Future; Expected date: 10/10/2018  -     Ferritin; Future; Expected date: 10/10/2018    Screening for diabetes mellitus  -     Comprehensive metabolic panel; Future; Expected date: 10/10/2018    Screening, lipid  -     Lipid panel; Future; Expected date: 10/10/2018    Screening for thyroid disorder  -     TSH; Future; Expected date: 10/10/2018    Screening mammogram, encounter for  -     Mammo Digital Screening Bilat with CAD; Future; Expected date: 10/10/2018      HM as above    RTC in 12 months, sooner if needed and depending on labs.    Christine Venegas MD  Department of Internal Medicine - [facility]  10/10/2018

## 2018-10-11 ENCOUNTER — LAB VISIT (OUTPATIENT)
Dept: LAB | Facility: HOSPITAL | Age: 43
End: 2018-10-11
Attending: INTERNAL MEDICINE
Payer: COMMERCIAL

## 2018-10-11 DIAGNOSIS — Z13.220 SCREENING, LIPID: ICD-10-CM

## 2018-10-11 DIAGNOSIS — D50.0 IRON DEFICIENCY ANEMIA DUE TO CHRONIC BLOOD LOSS: ICD-10-CM

## 2018-10-11 DIAGNOSIS — Z13.1 SCREENING FOR DIABETES MELLITUS: ICD-10-CM

## 2018-10-11 DIAGNOSIS — Z13.29 SCREENING FOR THYROID DISORDER: ICD-10-CM

## 2018-10-11 LAB
ALBUMIN SERPL BCP-MCNC: 4.1 G/DL
ALP SERPL-CCNC: 47 U/L
ALT SERPL W/O P-5'-P-CCNC: 21 U/L
ANION GAP SERPL CALC-SCNC: 8 MMOL/L
AST SERPL-CCNC: 28 U/L
BASOPHILS # BLD AUTO: 0.03 K/UL
BASOPHILS NFR BLD: 0.6 %
BILIRUB SERPL-MCNC: 0.6 MG/DL
BUN SERPL-MCNC: 13 MG/DL
CALCIUM SERPL-MCNC: 9.4 MG/DL
CHLORIDE SERPL-SCNC: 107 MMOL/L
CHOLEST SERPL-MCNC: 134 MG/DL
CHOLEST/HDLC SERPL: 2.2 {RATIO}
CO2 SERPL-SCNC: 25 MMOL/L
CREAT SERPL-MCNC: 0.8 MG/DL
DIFFERENTIAL METHOD: NORMAL
EOSINOPHIL # BLD AUTO: 0.2 K/UL
EOSINOPHIL NFR BLD: 4.4 %
ERYTHROCYTE [DISTWIDTH] IN BLOOD BY AUTOMATED COUNT: 13.5 %
EST. GFR  (AFRICAN AMERICAN): >60 ML/MIN/1.73 M^2
EST. GFR  (NON AFRICAN AMERICAN): >60 ML/MIN/1.73 M^2
FERRITIN SERPL-MCNC: 38 NG/ML
GLUCOSE SERPL-MCNC: 87 MG/DL
HCT VFR BLD AUTO: 41.6 %
HDLC SERPL-MCNC: 61 MG/DL
HDLC SERPL: 45.5 %
HGB BLD-MCNC: 13.9 G/DL
IRON SERPL-MCNC: 103 UG/DL
LDLC SERPL CALC-MCNC: 63.6 MG/DL
LYMPHOCYTES # BLD AUTO: 1.3 K/UL
LYMPHOCYTES NFR BLD: 23.7 %
MCH RBC QN AUTO: 30.5 PG
MCHC RBC AUTO-ENTMCNC: 33.4 G/DL
MCV RBC AUTO: 91 FL
MONOCYTES # BLD AUTO: 0.3 K/UL
MONOCYTES NFR BLD: 6.1 %
NEUTROPHILS # BLD AUTO: 3.4 K/UL
NEUTROPHILS NFR BLD: 65 %
NONHDLC SERPL-MCNC: 73 MG/DL
PLATELET # BLD AUTO: 240 K/UL
PMV BLD AUTO: 11.6 FL
POTASSIUM SERPL-SCNC: 4.2 MMOL/L
PROT SERPL-MCNC: 6.9 G/DL
RBC # BLD AUTO: 4.56 M/UL
SATURATED IRON: 26 %
SODIUM SERPL-SCNC: 140 MMOL/L
TOTAL IRON BINDING CAPACITY: 392 UG/DL
TRANSFERRIN SERPL-MCNC: 265 MG/DL
TRIGL SERPL-MCNC: 47 MG/DL
TSH SERPL DL<=0.005 MIU/L-ACNC: 1 UIU/ML
WBC # BLD AUTO: 5.27 K/UL

## 2018-10-11 PROCEDURE — 36415 COLL VENOUS BLD VENIPUNCTURE: CPT

## 2018-10-11 PROCEDURE — 85025 COMPLETE CBC W/AUTO DIFF WBC: CPT

## 2018-10-11 PROCEDURE — 83540 ASSAY OF IRON: CPT

## 2018-10-11 PROCEDURE — 80053 COMPREHEN METABOLIC PANEL: CPT

## 2018-10-11 PROCEDURE — 82728 ASSAY OF FERRITIN: CPT

## 2018-10-11 PROCEDURE — 80061 LIPID PANEL: CPT

## 2018-10-11 PROCEDURE — 84443 ASSAY THYROID STIM HORMONE: CPT

## 2018-12-04 ENCOUNTER — OFFICE VISIT (OUTPATIENT)
Dept: OBSTETRICS AND GYNECOLOGY | Facility: CLINIC | Age: 43
End: 2018-12-04
Payer: COMMERCIAL

## 2018-12-04 VITALS
HEIGHT: 61 IN | SYSTOLIC BLOOD PRESSURE: 116 MMHG | WEIGHT: 126.63 LBS | BODY MASS INDEX: 23.91 KG/M2 | DIASTOLIC BLOOD PRESSURE: 68 MMHG

## 2018-12-04 DIAGNOSIS — Z12.4 PAP SMEAR FOR CERVICAL CANCER SCREENING: Primary | ICD-10-CM

## 2018-12-04 DIAGNOSIS — Z11.51 SCREENING FOR HUMAN PAPILLOMAVIRUS: ICD-10-CM

## 2018-12-04 DIAGNOSIS — N92.0 MENORRHAGIA WITH REGULAR CYCLE: ICD-10-CM

## 2018-12-04 PROCEDURE — 99999 PR PBB SHADOW E&M-EST. PATIENT-LVL III: CPT | Mod: PBBFAC,,, | Performed by: OBSTETRICS & GYNECOLOGY

## 2018-12-04 PROCEDURE — 87624 HPV HI-RISK TYP POOLED RSLT: CPT

## 2018-12-04 PROCEDURE — 99386 PREV VISIT NEW AGE 40-64: CPT | Mod: S$GLB,,, | Performed by: OBSTETRICS & GYNECOLOGY

## 2018-12-04 PROCEDURE — 88175 CYTOPATH C/V AUTO FLUID REDO: CPT

## 2018-12-04 RX ORDER — FERROUS SULFATE 325(65) MG
325 TABLET ORAL
COMMUNITY
End: 2021-03-08

## 2018-12-04 NOTE — LETTER
December 4, 2018      Christine Venegas MD  [address]  [facility] [address]           [facility]  [address], [address]  [facility] [address]  Phone: [phone]  Fax: [phone]          Patient: Adrienne Andrew   MR Number: [id number]   YOB: 1975   Date of Visit: 12/4/2018       Dear Dr. Kelly:    Thank you for referring Adrienne Andrew to me for evaluation. Attached you will find relevant portions of my assessment and plan of care.    If you have questions, please do not hesitate to call me. I look forward to following Adrienne Andrew along with you.    Sincerely,    Kamila Lamb MD    Enclosure  CC:  No Recipients    If you would like to receive this communication electronically, please contact [email or web address] or [phone] to request more information on [employer] access.    For providers and/or their staff who would like to refer a patient to [facility], please contact us through our one-stop-shop provider referral line, [facility], at [phone].    If you feel you have received this communication in error or would no longer like to receive these types of communications, please e-mail [email or web address]

## 2018-12-04 NOTE — PROGRESS NOTES
Chief Complaint: Well Woman Exam, Menorrhagia     HPI:      Adrienne Andrew is a 43 y.o.  who presents for annual exam. She is currently complaining of menorrhagia.    Has been told in the past that she might have endometriosis due to several stomach pain during menses and then pain during bowel movements - but this has resolved with increased exercise. Still occasionally has this pain but it's much more mild than before.   TSH WNL in October.     Wears super plus tampons which she sometimes soaks through in an hour. 2-3 days of heavy flow followed by 2-3 days of light flow. Denies intermenstrual spotting. Flow has been heavier over the past few years. No abnormal discharge, pelvic pain, or dyspareunia.    Ms. Andrew is currently sexually active with a single male partner. Same partner for the past 18 years. She declines STD screening today. Patient has regular monthly menses. Patient's last menstrual period was 2018. She is currently using vasectomy for contraception.    Previous Pap:  no abnormalities per patient (5-6 years)  Previous Mammogram: [facility] 1 (10/10/18)    Ms. Andrew confirms that she wears her seatbelt when riding in the car and does not text while driving.     OB History      Para Term  AB Living    4 3 3   1 3    SAB TAB Ectopic Multiple Live Births    1       3        ROS:     GENERAL: Denies unintentional weight gain or weight loss. Feeling well overall.   SKIN: Denies rash or lesions.   HEENT: Denies headaches, or vision changes.   CARDIOVASCULAR: Denies palpitations or chest pain.   RESPIRATORY: Denies shortness of breath or dyspnea on exertion.  BREASTS: Denies pain, lumps, or nipple discharge.   ABDOMEN: Denies abdominal pain, constipation, diarrhea, nausea, vomiting, or change in appetite.   URINARY: Denies frequency, dysuria, hematuria.  NEUROLOGIC: Denies syncope or weakness.   PSYCHIATRIC: Denies depression, anxiety or mood swings.    Physical  "Exam:      PHYSICAL EXAM:  /68   Ht 5' 1" (1.549 m)   Wt 57.5 kg (126 lb 10.5 oz)   LMP 11/09/2018   BMI 23.93 kg/m²   Body mass index is 23.93 kg/m².     APPEARANCE: Well nourished, well developed, in no acute distress.  PSYCH: Appropriate mood and affect.  SKIN: No acne or hirsutism  NECK: Neck symmetric without masses or thyromegaly  NODES: No inguinal, axillary, or supraclavicular lymph node enlargement  CHEST: Normal respiratory effort.  ABDOMEN: Soft.  No tenderness or masses.   BREASTS: Symmetrical, no skin changes or visible lesions.  No palpable masses or nipple discharge bilaterally.  PELVIC: Normal external genitalia without lesions.  Normal hair distribution.  Adequate perineal body, normal urethral meatus.  Vagina moist and well rugated without lesions or discharge.  Cervix pink, without lesions, discharge or tenderness. Displaced posteriorly either by fibroid or anteflexed uterus.  No significant cystocele or rectocele.  Bimanual exam shows uterus to be enlarged, about 12 week size, irregular with either anterior fibroid or very anteflexed, mobile and nontender.  Adnexa without masses or tenderness.    EXTREMITIES: No edema.    Assessment/Plan:     Pap smear for cervical cancer screening  -     Liquid-based pap smear, screening    Screening for human papillomavirus  -     HPV High Risk Genotypes, PCR    Menorrhagia with regular cycle  -     US Pelvis Comp with Transvag NON-OB (xpd      Counseling:     Patient was counseled today on current ASCCP pap guidelines, the recommendation for yearly pelvic exams, healthy diet and exercise routines, breast self awareness and annual mammograms. She is to see her PCP for other health maintenance.       Use of the [facility] Patient Portal discussed and encouraged during today's visit.     "

## 2018-12-05 ENCOUNTER — OFFICE VISIT (OUTPATIENT)
Dept: OPTOMETRY | Facility: CLINIC | Age: 43
End: 2018-12-05
Payer: COMMERCIAL

## 2018-12-05 DIAGNOSIS — H52.13 MYOPIA OF BOTH EYES: Primary | ICD-10-CM

## 2018-12-05 DIAGNOSIS — Z46.0 FITTING AND ADJUSTMENT OF SPECTACLES AND CONTACT LENSES: Primary | ICD-10-CM

## 2018-12-05 PROCEDURE — 92310 CONTACT LENS FITTING OU: CPT | Mod: S$GLB,,, | Performed by: OPTOMETRIST

## 2018-12-05 PROCEDURE — 99999 PR PBB SHADOW E&M-EST. PATIENT-LVL II: CPT | Mod: PBBFAC,,, | Performed by: OPTOMETRIST

## 2018-12-05 PROCEDURE — 92015 DETERMINE REFRACTIVE STATE: CPT | Mod: S$GLB,,, | Performed by: OPTOMETRIST

## 2018-12-05 PROCEDURE — 92014 COMPRE OPH EXAM EST PT 1/>: CPT | Mod: S$GLB,,, | Performed by: OPTOMETRIST

## 2018-12-05 NOTE — PATIENT INSTRUCTIONS
Presbyopia      Presbyopia is a vision condition in which the crystalline lens of your eye loses its flexibility, which makes it difficult for you to focus on close objects.  Presbyopia may seem to occur suddenly, but the actual loss of flexibility takes place over a number of years. Presbyopia usually becomes noticeable in the early to mid-40s. Presbyopia is a natural part of the aging process of the eye. It is not a disease, and it cannot be prevented.  Some signs of presbyopia include the tendency to hold reading materials at arm's length, blurred vision at normal reading distance and eye fatigue along with headaches when doing close work. A comprehensive optometric examination will include testing for presbyopia.  To help you compensate for presbyopia, your optometrist can prescribe reading glasses, bifocals, trifocals or contact lenses. Because presbyopia can complicate other common vision conditions like nearsightedness, farsightedness and astigmatism, your optometrist will determine the specific lenses to allow you to see clearly and comfortably. You may only need to wear your glasses for close work like reading, but you may find that wearing them all the time is more convenient and beneficial for your vision needs.  Because the effects of presbyopia continue to change the ability of the crystalline lens to focus properly, periodic changes in your eyewear may be necessary to maintain clear and comfortable vision      Courtesy of the [employer]

## 2018-12-05 NOTE — PROGRESS NOTES
HPI     Adrienne Andrew is a 43 y.o. female who returns  for continued eye   care.  Adrienne has bilateral myopia. She wears  Acuvue Marilu (8.4/14.0) contact   lenses for optical correction. She reports that she is satisfied with her   vision and comfort of her current contact lens . She relays that her near   vision is changing, but it does not interfere with her daily activities.     (--)blurred vision  (--)Headaches  (--)diplopia  (--)flashes  (--)floaters  (--)pain  (--)Itching  (--)tearing  (--)burning  (--)Dryness  (--) OTC Drops  (--)Photophobia    Last edited by Jung Fuentes, OD on 12/5/2018 11:28 AM. (History)        Review of Systems   Constitutional: Negative for chills, fever and malaise/fatigue.   HENT: Negative for congestion and hearing loss.    Eyes: Negative for blurred vision, double vision, photophobia, pain, discharge and redness.   Respiratory: Negative.    Cardiovascular: Negative.    Gastrointestinal: Negative.    Genitourinary: Negative.    Musculoskeletal: Negative.    Skin: Negative.    Neurological: Negative for seizures.   Endo/Heme/Allergies: Negative for environmental allergies.   Psychiatric/Behavioral: Negative.        Assessment /Plan     For exam results, see Encounter Report.    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 --> 0.25D decrease OU  - Will decrease CLpower which should improve near vision with CLs  CLRx per below for 1 month disposal/replacement    -Advised against overnight wear, risks of overnight wear explained;     -Optive artificial tears for comfort prn;    -Optifree Puremoist or Clear Care solutions recommended    Contact Lens Prescription (12/5/2018)        Brand Base Curve Diameter Sphere    Right Acuvue Marilu 8.4 14.0 -1.00    Left Acuvue Marilu 8.4 14.0 -1.00    Expiration Date:  12/6/2019    Replacement:  Monthly    Solutions:  OptiFree PureMoist    Wearing Schedule:  Daily wear        same specs ok  Glasses Prescription (12/5/2018)        Sphere Cylinder     Right -1.00 Sphere    Left -1.00 Sphere    Type:  SVL    Expiration Date:  12/6/2019          Patient education; RTC in 1 year with DFE (refused today so MUST do DFE in 2019!!!!!!), sooner prn

## 2018-12-07 LAB
HPV HR 12 DNA CVX QL NAA+PROBE: NEGATIVE
HPV16 AG SPEC QL: NEGATIVE
HPV18 DNA SPEC QL NAA+PROBE: NEGATIVE

## 2018-12-18 ENCOUNTER — OFFICE VISIT (OUTPATIENT)
Dept: OBSTETRICS AND GYNECOLOGY | Facility: CLINIC | Age: 43
End: 2018-12-18
Payer: COMMERCIAL

## 2018-12-18 VITALS
BODY MASS INDEX: 24.39 KG/M2 | SYSTOLIC BLOOD PRESSURE: 118 MMHG | WEIGHT: 129.19 LBS | DIASTOLIC BLOOD PRESSURE: 76 MMHG | HEIGHT: 61 IN

## 2018-12-18 DIAGNOSIS — N83.201 BILATERAL OVARIAN CYSTS: ICD-10-CM

## 2018-12-18 DIAGNOSIS — N93.9 ABNORMAL UTERINE BLEEDING: Primary | ICD-10-CM

## 2018-12-18 DIAGNOSIS — N83.202 BILATERAL OVARIAN CYSTS: ICD-10-CM

## 2018-12-18 PROCEDURE — 99212 OFFICE O/P EST SF 10 MIN: CPT | Mod: S$GLB,,, | Performed by: OBSTETRICS & GYNECOLOGY

## 2018-12-18 PROCEDURE — 99999 PR PBB SHADOW E&M-EST. PATIENT-LVL III: CPT | Mod: PBBFAC,,, | Performed by: OBSTETRICS & GYNECOLOGY

## 2018-12-18 PROCEDURE — 3008F BODY MASS INDEX DOCD: CPT | Mod: CPTII,S$GLB,, | Performed by: OBSTETRICS & GYNECOLOGY

## 2018-12-18 RX ORDER — MEDROXYPROGESTERONE ACETATE 10 MG/1
10 TABLET ORAL DAILY
Qty: 30 TABLET | Refills: 11 | Status: SHIPPED | OUTPATIENT
Start: 2018-12-18 | End: 2019-12-09

## 2018-12-18 NOTE — PROGRESS NOTES
"  Chief Complaint: Follow up of AUB     HPI:      Adrienne Andrew is a 43 y.o.  who presents for follow up of increasingly heavy menses over the past few years. No evidence of abnormality within the vagina, and TSH WNL.    ROS:     GENERAL: Denies recent fevers or chills. Feeling well overall.     Physical Exam:      /76   Ht 5' 1" (1.549 m)   Wt 58.6 kg (129 lb 3 oz)   BMI 24.41 kg/m²   Body mass index is 24.41 kg/m².    APPEARANCE: Well nourished, well developed, in no acute distress.  PSYCH: Appropriate mood and affect, judgement intact.    Results     Pelvic U/S performed in clinic today:     Uterus: Size: 8.7 x 5.1 x 6.4 cm  Appearance: No masses.  Endometrium: Not thickened in this pre menopausal patient, measuring 8 mm.    Right ovary: Size: 3.5 x 2.1 x 3.9 cm  Appearance: 1.9 cm rounded structure on the ovary with homogeneous low-low level internal echoes.  Few normal appearing follicles as well.  Vascular flow: Normal.    Left ovary: Size: 5.2 x 4.2 x 5.1 cm  Appearance: 4.8 cm rounded structure on the ovary with homogeneous low-low level internal echoes.  Vascular Flow: Normal.    Free Fluid: None.    Assessment/Plan:     Abnormal uterine bleeding  -     medroxyPROGESTERone (PROVERA) 10 MG tablet; Take 1 tablet (10 mg total) by mouth once daily.  Dispense: 30 tablet; Refill: 11         -     Discussed treatment options, including endometrial ablation, IUD, combined hormone treatment. Patient wishes to try progesterone only first and move on to surgery only if necessary.    Ovarian Cysts - repeat ultrasound in 3 months      "

## 2019-07-08 ENCOUNTER — OFFICE VISIT (OUTPATIENT)
Dept: SPORTS MEDICINE | Facility: CLINIC | Age: 44
End: 2019-07-08
Payer: COMMERCIAL

## 2019-07-08 ENCOUNTER — HOSPITAL ENCOUNTER (OUTPATIENT)
Dept: RADIOLOGY | Facility: HOSPITAL | Age: 44
Discharge: HOME | End: 2019-07-08
Attending: PHYSICIAN ASSISTANT
Payer: COMMERCIAL

## 2019-07-08 DIAGNOSIS — M25.511 CHRONIC RIGHT SHOULDER PAIN: ICD-10-CM

## 2019-07-08 DIAGNOSIS — G89.29 CHRONIC RIGHT SHOULDER PAIN: ICD-10-CM

## 2019-07-08 DIAGNOSIS — M75.21 BICEPS TENDINITIS OF RIGHT UPPER EXTREMITY: Primary | ICD-10-CM

## 2019-07-08 DIAGNOSIS — M25.311 SHOULDER INSTABILITY, RIGHT: ICD-10-CM

## 2019-07-08 PROCEDURE — 20610 PR DRAIN/INJECT LARGE JOINT/BURSA: ICD-10-PCS | Mod: RT,S$GLB,, | Performed by: PHYSICIAN ASSISTANT

## 2019-07-08 PROCEDURE — 99213 OFFICE O/P EST LOW 20 MIN: CPT | Mod: 25,S$GLB,, | Performed by: PHYSICIAN ASSISTANT

## 2019-07-08 PROCEDURE — 99999 PR PBB SHADOW E&M-EST. PATIENT-LVL III: ICD-10-PCS | Mod: PBBFAC,,, | Performed by: PHYSICIAN ASSISTANT

## 2019-07-08 PROCEDURE — 73030 X-RAY EXAM OF SHOULDER: CPT | Mod: 26,RT,, | Performed by: RADIOLOGY

## 2019-07-08 PROCEDURE — 99213 PR OFFICE/OUTPT VISIT, EST, LEVL III, 20-29 MIN: ICD-10-PCS | Mod: 25,S$GLB,, | Performed by: PHYSICIAN ASSISTANT

## 2019-07-08 PROCEDURE — 73030 XR SHOULDER COMPLETE 2 OR MORE VIEWS RIGHT: ICD-10-PCS | Mod: 26,RT,, | Performed by: RADIOLOGY

## 2019-07-08 PROCEDURE — 20610 DRAIN/INJ JOINT/BURSA W/O US: CPT | Mod: RT,S$GLB,, | Performed by: PHYSICIAN ASSISTANT

## 2019-07-08 PROCEDURE — 99999 PR PBB SHADOW E&M-EST. PATIENT-LVL III: CPT | Mod: PBBFAC,,, | Performed by: PHYSICIAN ASSISTANT

## 2019-07-08 PROCEDURE — 73030 X-RAY EXAM OF SHOULDER: CPT | Mod: TC,FY,PO,RT

## 2019-07-08 RX ORDER — BUPIVACAINE HYDROCHLORIDE 2.5 MG/ML
2 INJECTION, SOLUTION INFILTRATION; PERINEURAL
Status: COMPLETED | OUTPATIENT
Start: 2019-07-08 | End: 2019-07-08

## 2019-07-08 RX ORDER — TRIAMCINOLONE ACETONIDE 40 MG/ML
40 INJECTION, SUSPENSION INTRA-ARTICULAR; INTRAMUSCULAR
Status: COMPLETED | OUTPATIENT
Start: 2019-07-08 | End: 2019-07-08

## 2019-07-08 RX ORDER — LIDOCAINE HYDROCHLORIDE 10 MG/ML
2 INJECTION INFILTRATION; PERINEURAL
Status: COMPLETED | OUTPATIENT
Start: 2019-07-08 | End: 2019-07-08

## 2019-07-08 RX ADMIN — TRIAMCINOLONE ACETONIDE 40 MG: 40 INJECTION, SUSPENSION INTRA-ARTICULAR; INTRAMUSCULAR at 08:07

## 2019-07-08 RX ADMIN — LIDOCAINE HYDROCHLORIDE 2 ML: 10 INJECTION INFILTRATION; PERINEURAL at 08:07

## 2019-07-08 RX ADMIN — BUPIVACAINE HYDROCHLORIDE 5 MG: 2.5 INJECTION, SOLUTION INFILTRATION; PERINEURAL at 08:07

## 2019-07-08 NOTE — PROGRESS NOTES
"Subjective:          Chief Complaint: Adrienne Andrew is a 43 y.o. female who had no chief complaint listed for this encounter.    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 The pain started 3 months ago with no clear MACK and is becoming progressively worse after dumbbell workout in [employer]. Pain is located over (points to) anterior arm and AC joint. She reports that the pain is a 5 /10 pulling pain today and not responding adequately to conservative measures which have included activity modifications, rest, and oral medication. Is affecting ADLs and limiting desired level of activity. Denies numbness, tingling, radiation, and neck pain or radicular symptoms.  Pain is 6 /10 at its worst    Mechanical symptoms: none  Subjective instability: (+)   Worse with no particular movements.  Better with rest.   Nocturnal symptoms: (--)    No previous surgeries or trauma on shoulders    Does report hx of "dislocating joint" on command (possible subluxations) as a kid  Hx of [employer]            Review of Systems   Constitution: Negative for chills and fever.   HENT: Negative for congestion and sore throat.    Eyes: Negative for discharge and double vision.   Cardiovascular: Negative for chest pain, palpitations and syncope.   Respiratory: Negative for cough and shortness of breath.    Endocrine: Negative for cold intolerance and heat intolerance.   Skin: Negative for dry skin and rash.   Musculoskeletal: Positive for joint pain. Negative for falls, gout, joint swelling, muscle cramps and muscle weakness.   Gastrointestinal: Negative for abdominal pain, nausea and vomiting.   Neurological: Negative for focal weakness, numbness and paresthesias.                   Objective:        General: Adrienne is well-developed, well-nourished, appears stated age, in no acute distress, alert and oriented to time, place and person.     General    Vitals " reviewed.  Constitutional: She is oriented to person, place, and time. She appears well-developed and well-nourished. No distress.   HENT:   Head: Normocephalic and atraumatic.   Nose: Nose normal.   Eyes: Conjunctivae and EOM are normal. Pupils are equal, round, and reactive to light.   Cardiovascular: Normal rate, regular rhythm and intact distal pulses.    Pulmonary/Chest: Effort normal and breath sounds normal.   Abdominal: Soft. Bowel sounds are normal. She exhibits no distension.   Neurological: She is alert and oriented to person, place, and time. She has normal reflexes.   Psychiatric: She has a normal mood and affect. Her behavior is normal. Judgment and thought content normal.         Right Shoulder Exam     Inspection/Observation   Swelling: absent  Bruising: absent  Scars: absent  Deformity: absent  Scapular Winging: absent  Scapular Dyskinesia: negative  Atrophy: absent    Tenderness   The patient is tender to palpation of the acromioclavicular joint.    Range of Motion   Active abduction: 150   Passive abduction: 150   Extension: 50   Forward Flexion: 180   Forward Elevation: 180Adduction: 30   External Rotation 0 degrees:  80 (discomfort) abnormal   Internal rotation 0 degrees: T6   Internal rotation 90 degrees: 90     Tests & Signs   Apprehension: positive  Cross arm: positive  Drop arm: negative  Doan test: negative  Impingement: negative  Lift Off Sign: negative  Belly Press: negative  Active Compression Test (Lewis's Sign): positive  Yergason's Test: negative  Speed's Test: positive  Anterior Drawer Test: 2+   Relocation 90 degrees: postive  Jerk Test: negative    Other   Sensation: normal    Left Shoulder Exam     Inspection/Observation   Swelling: absent  Bruising: absent  Scars: absent  Deformity: absent  Scapular Winging: absent  Scapular Dyskinesia: negative  Atrophy: absent    Range of Motion   Active abduction: 150   Passive abduction: 150   Extension: 50   Forward Flexion: 180    Forward Elevation: 180Adduction: 30   External Rotation 0 degrees: 80   Internal rotation 0 degrees: T6   Internal rotation 90 degrees: 90     Tests & Signs   Apprehension: negative  Cross arm: negative  Drop arm: negative  Doan test: negative  Impingement: negative  Lift Off Sign: negative  Belly Press: negative  Active Compression test ([place]'s Sign): negative  Yergasons's Test: negative  Speed's Test: negative  Relocation 90 degrees: negative  Jerk Test: negative    Other   Sensation: normal       Muscle Strength   Right Upper Extremity   Shoulder Abduction: 5/5   Shoulder Internal Rotation: 5/5   Shoulder External Rotation: 5/5   Supraspinatus: 5/5/5   Subscapularis: 5/5/5   Biceps: 5/5/5   Left Upper Extremity  Shoulder Abduction: 5/5   Shoulder Internal Rotation: 5/5   Shoulder External Rotation: 5/5   Supraspinatus: 5/5/5   Subscapularis: 5/5/5   Biceps: 5/5/5     Vascular Exam     Right Pulses      Radial:                    2+      Left Pulses      Radial:                    2+      Capillary Refill  Right Hand: normal capillary refill  Left Hand: normal capillary refill    Radiographic Findings 07/08/2019:    No fracture dislocation bone destruction seen.    Xrays of the right shoulder were ordered and reviewed by me today. These findings were discussed and reviewed with the patient.          Assessment:       Encounter Diagnoses   Name Primary?    Biceps tendinitis of right upper extremity Yes    Chronic right shoulder pain     Shoulder instability, right           Plan:       We have discussed a variety of treatment options including medications, injections, physical therapy and other alternative treatments. I also explained the indications, risks and benefits of surgery. Patient chooses to proceed with CSI and physical therapy at this time.    I made the decision to obtain old records of the patient including previous notes and imaging. I independently reviewed and interpreted lab results  today as well as prior imaging.     1. Injection Procedure  A time out was performed, including verification of patient ID, procedure, site and side, availability of information and equipment, review of safety issues, and agreement with consent, the procedure site was marked.    After time out was performed, the patient was prepped aseptically with chloraprep swabstick. A diagnostic and therapeutic injection of 1:4cc Kenalog/Lidocaine/Marcaine was given under sterile technique using a 22g x 1.5 needle from the Posterior  aspect of the right Subacromial in the sitting position.      Adrienne Andrew had no adverse reactions to the medication. Pain decreased. She was instructed to apply ice to the joint for 20 minutes and avoid strenuous activities for 24-36 hours following the injection. She was warned of possible blood sugar and/or blood pressure changes during that time. Following that time, she can resume regular activities.    She was reminded to call the clinic immediately for any adverse side effects as explained in clinic today.    2. Ice compress to the affected area 2-3x a day for 15-20 minutes as needed for pain management.  3. Ambulatory referral to physical therapy for periscapular/rotator cuff strengthening.   4. RTC to see Ck Brown PA-C as needed for follow-up.    All of the patient's questions were answered and the patient will contact us if they have any questions or concerns in the interim.                  Patient questionnaires may have been collected.

## 2019-07-17 ENCOUNTER — CLINICAL SUPPORT (OUTPATIENT)
Dept: REHABILITATION | Facility: HOSPITAL | Age: 44
End: 2019-07-17
Attending: PHYSICIAN ASSISTANT
Payer: COMMERCIAL

## 2019-07-17 DIAGNOSIS — G89.29 CHRONIC RIGHT SHOULDER PAIN: ICD-10-CM

## 2019-07-17 DIAGNOSIS — M25.511 CHRONIC RIGHT SHOULDER PAIN: ICD-10-CM

## 2019-07-17 PROCEDURE — 97161 PT EVAL LOW COMPLEX 20 MIN: CPT

## 2019-07-17 PROCEDURE — 97110 THERAPEUTIC EXERCISES: CPT

## 2019-07-17 NOTE — PLAN OF CARE
[facility]  Physical Therapy Initial Evaluation    Name: Adrienne Andrew  Clinic Number: [id number]    Therapy Diagnosis:   Encounter Diagnosis   Name Primary?    Chronic right shoulder pain      Physician: Hamilton Brown, *    Physician Orders: PT Eval and Treat   Medical Diagnosis from Referral: M25.511,G89.29 (ICD-10-CM) - Chronic right shoulder pain  Evaluation Date: 7/17/2019  Authorization Period Expiration: 12/31/19  Plan of Care Expiration: 9/11/19  Visit # / Visits authorized: 1/ 30    Time In: 1500  Time Out: 1550  Total Billable Time: 50 minutes    Precautions: Standard    Subjective   Date of onset: 2 weeks ago  History of current condition - Adrienne reports: that she had subluxed her shoulder when she was a kid.  About 2 weeks ago, was doing a dumbbell work out when her shoulder started hurting.  Thought it would go away but never did.  Denied subluxation.  States that the pain is on the top of the shoulder and feels tight.  Reports that she got cortisone injection last Monday.  Has never had surgery on R shoulder.  Also reports that she used to do crossfit but gave that up prior to her shoulder injury.       Past Medical History:   Diagnosis Date    Anemia      Adrienne Andrew  has a past surgical history that includes [place] tooth extraction and Dilation and curettage of uterus.    Adrienne has a current medication list which includes the following prescription(s): ferrous sulfate, medroxyprogesterone, and sulconazole.    Review of patient's allergies indicates:  No Known Allergies     Imaging, X-ray: No fracture dislocation bone destruction seen.    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  Prior Level of Function: I with all recreational and occupational activities  Current Level of Function: Difficulty and pain with working out and doing wood  working    Pain:  Current 3/10, worst 7/10, best 1/10   Location: right shoulder  Description: pulling  Aggravating Factors: lifting, walking her dog, sleeping on R side  Easing Factors: cortisone shot, rest    Pts goals: To return to lifting without irritating pain.      Objective     Observation: Pt is healthy and in no acute distress.    Posture: protracted and low inferior angle of scapula on the R      Active Range of Motion: WNLs but painful on the R with flexion, abduction, and IR    Strength:  Shoulder Right Left   Flexion 5/5 5/5   Abduction 5/5 5/5   ER 4/5 5/5   IR 4/5 w/ pain 5/5   Middle Trap 4/5 4+/5   Lower Trap 4-/5 w/ pain 4+/5   Serratus anterior: B fair strength    Special Tests:     Right   Load & Shift -   Sulcus Sign -   Apprehension Test -       Joint Mobility: normal    Palpation: TTP to coracoid process        CMS Impairment/Limitation/Restriction for FOTO Shoulder Survey    Therapist reviewed FOTO scores for Adrienne Andrew on 7/17/2019.   FOTO documents entered into [employer] - see Media section.    Limitation Score: 33%       TREATMENT   Treatment Time In: 1530  Treatment Time Out: 1555  Total Treatment time separate from Evaluation: 25 minutes    Adrienne received therapeutic exercises to develop strength, endurance, ROM, flexibility, posture and core stabilization for 25 minutes including:  Planks w/ alt shoulder taps   Planks (hands on BOSU) w/ alt hip ext   Wall clocks OTB   GTB ER walk out holds 3 x 30 sec holds  [place] carry 25# kettle bell  TrX rows x 10      Home Exercises and Patient Education Provided    Education provided re: POC, goals for therapy, role of therapy for care, exercises/HEP    Written Home Exercises Provided: yes.  Exercises were reviewed and Adrienne was able to demonstrate them prior to the end of the session.   Pt received a written copy of exercises to perform at home. Adrienne demonstrated good  understanding of the education provided.     See EMR under media  for exercises given.   Assessment   Adrienne is a 43 y.o. female referred to outpatient Physical Therapy with a medical diagnosis of R shoulder pain.  Pt's c/c is R anterior shoulder pain with overhead lifting.  Pt presents with decreased strength and stability of R shoulder.  Pt will benefit from physical therapy, specifically scapular stabilization and RC strengthening, in order to reduce her c/o pain, improve impairments and functional limitations.    Pt prognosis is Good.   Pt will benefit from skilled outpatient Physical Therapy to address the deficits stated above and in the chart below, provide pt/family education, and to maximize pt's level of independence.     Plan of care discussed with patient: Yes  Pt's spiritual, cultural and educational needs considered and patient is agreeable to the plan of care and goals as stated below:     Anticipated Barriers for therapy: none    Medical Necessity is demonstrated by the following  History  Co-morbidities and personal factors that may impact the plan of care Co-morbidities:   none    Personal Factors:   no deficits     low   Examination  Body Structures and Functions, activity limitations and participation restrictions that may impact the plan of care Body Regions:   R shoulder    Body Systems:    strength  gross coordinated movement  motor control  motor learning    Participation Restrictions:   Lifting overhead, working out, doing wood work    Activity limitations:   Learning and applying knowledge  no deficits    General Tasks and Commands  no deficits    Communication  no deficits    Mobility  no deficits    Self care  no deficits    Domestic Life  no deficits    Interactions/Relationships  no deficits    Life Areas  no deficits    Community and Social Life  community life  recreation and leisure         high   Clinical Presentation stable and uncomplicated low   Decision Making/ Complexity Score: low     GOALS: Short Term Goals:  4 weeks  1.Report decreased R  shoulder pain < / =  4/10  to increase tolerance for working out  2. Increase AROM to WNLs without c/o pain.    3. Increased strength by 1/3 MMT grade in R UE to increase tolerance for ADL and work activities.  4. Pt to tolerate HEP to improve ROM and independence with ADL's    Long Term Goals: 8 weeks  1.Report decreased R shoulder pain  < / =  0-1 /10  to increase tolerance for working out and lifting overhead.  2. Increase strength to >/= 5/5 in R UE to increase tolerance for ADL and work activities.  4. Pt goal:  To return to lifting without irritating pain.    5. Pt will be able to perform overhead squat without c/o pain or difficulty.        Plan   Plan of care Certification: 7/17/2019 to 9/11/19.    Outpatient Physical Therapy 2 times weekly for 8 weeks to include the following interventions: Manual Therapy, Moist Heat/ Ice, Neuromuscular Re-ed, Patient Education, Therapeutic Activites and Therapeutic Exercise.     Mary Peña, PT, DPT, OCS

## 2019-07-22 ENCOUNTER — CLINICAL SUPPORT (OUTPATIENT)
Dept: REHABILITATION | Facility: HOSPITAL | Age: 44
End: 2019-07-22
Attending: PHYSICIAN ASSISTANT
Payer: COMMERCIAL

## 2019-07-22 DIAGNOSIS — G89.29 CHRONIC RIGHT SHOULDER PAIN: ICD-10-CM

## 2019-07-22 DIAGNOSIS — M25.511 CHRONIC RIGHT SHOULDER PAIN: ICD-10-CM

## 2019-07-22 PROCEDURE — 97110 THERAPEUTIC EXERCISES: CPT

## 2019-07-22 NOTE — PROGRESS NOTES
Physical Therapy Daily Treatment Note     Name: Adrienne Andrew  Clinic Number: [id number]    Therapy Diagnosis:   Encounter Diagnosis   Name Primary?    Chronic right shoulder pain      Physician: Hamilton Brown, *    Visit Date: 7/22/2019  Physician Orders: PT Eval and Treat   Medical Diagnosis from Referral: M25.511,G89.29 (ICD-10-CM) - Chronic right shoulder pain  Evaluation Date: 7/17/2019  Authorization Period Expiration: 12/31/19  Plan of Care Expiration: 9/11/19  Visit # / Visits authorized: 2/ 30     Time In: 0800  Time Out: 0855  Total Billable Time: 40 minutes     Precautions: Standard    Subjective     Pt reports: that she feels about the same.  Was having sinus issues over the weekend and therefore was unable to perform plank exercises.  She was compliant with home exercise program.  Response to previous treatment: no adverse effects  Functional change: n/a    Pain: 5/10  Location: right shoulder      Objective     Adrienne received therapeutic exercises to develop strength, endurance, ROM, flexibility, posture and core stabilization for 55 minutes including:  Wall clocks OTB 3 x 10  Ball dribble on wall in arc (12-3) 3 x fatigue  Serratus wall slides w/ OTB and FR 3 x 10  GTB ER walk out holds 3 x 30 sec holds  OTB IR walk out holds 6 x 10 sec holds  OTB shoulder abduction w/ bkwd lunge 2 x 10 B  GTB Squat to W 3 x 10   Wall push ups on SB 3 x 10  Body blade 2 way - flex/abd 3 x fatigue each       Adrienne participated in dynamic functional therapeutic activities to improve functional performance for 0  minutes, including:  Planks w/ alt shoulder taps NP  Planks (hands on BOSU) w/ alt hip ext NP  [place] carry 25# kettle bell  TrX rows         Home Exercises Provided and Patient Education Provided     Education provided:   - proper technique for exercises    Written Home Exercises Provided: Patient instructed to cont prior HEP.  Exercises were reviewed and Adrienne was able to demonstrate them  prior to the end of the session.  Adrienne demonstrated good  understanding of the education provided.     See EMR under Media for exercises provided at IE.    Assessment     Pt tolerated treatment well today.  Pt did experience some increased c/o anterior/superior shoulder (near coracoid) pain during squat to W but only felt pain/tightness after she rested.  All other exercises were completed without c/o pain.  Extender used.  Did not perform any therapeutic activities today secondary to sinus issues and pt requesting to stay upright.  Will continue to focus on scapular stabilization in order to reduce her c/o coracoid pain and decrease overuse of bicep.      Adrienne is progressing well towards her goals.   Pt prognosis is Good.     Pt will continue to benefit from skilled outpatient physical therapy to address the deficits listed in the problem list box on initial evaluation, provide pt/family education and to maximize pt's level of independence in the home and community environment.     Pt's spiritual, cultural and educational needs considered and pt agreeable to plan of care and goals.    Anticipated barriers to physical therapy: none    GOALS: Short Term Goals:  4 weeks  1.Report decreased R shoulder pain < / =  4/10  to increase tolerance for working out Progressing, not met  2. Increase AROM to WNLs without c/o pain.  Progressing, not met  3. Increased strength by 1/3 MMT grade in R UE to increase tolerance for ADL and work activities. Progressing, not met  4. Pt to tolerate HEP to improve ROM and independence with ADL's.  Progressing, not met     Long Term Goals: 8 weeks  1.Report decreased R shoulder pain  < / =  0-1 /10  to increase tolerance for working out and lifting overhead.  Progressing, not met  2. Increase strength to >/= 5/5 in R UE to increase tolerance for ADL and work activities.  Progressing, not met  3. Pt goal:  To return to lifting without irritating pain. Progressing, not met  4. Pt will be  able to perform overhead squat without c/o pain or difficulty. Progressing, not met       Plan     Continue with established Plan of Care towards PT goals, focusing on shoulder/scapular stabilization.    Mary Peña, PT, DPT, OCS

## 2019-07-25 ENCOUNTER — CLINICAL SUPPORT (OUTPATIENT)
Dept: REHABILITATION | Facility: HOSPITAL | Age: 44
End: 2019-07-25
Attending: PHYSICIAN ASSISTANT
Payer: COMMERCIAL

## 2019-07-25 DIAGNOSIS — G89.29 CHRONIC RIGHT SHOULDER PAIN: ICD-10-CM

## 2019-07-25 DIAGNOSIS — M25.511 CHRONIC RIGHT SHOULDER PAIN: ICD-10-CM

## 2019-07-25 PROCEDURE — 97110 THERAPEUTIC EXERCISES: CPT

## 2019-07-25 PROCEDURE — 97140 MANUAL THERAPY 1/> REGIONS: CPT

## 2019-07-25 NOTE — PROGRESS NOTES
Physical Therapy Daily Treatment Note     Name: Adrienne Andrew  Clinic Number: [id number]    Therapy Diagnosis:   Encounter Diagnosis   Name Primary?    Chronic right shoulder pain      Physician: Hamilton Brown, *    Visit Date: 7/25/2019  Physician Orders: PT Eval and Treat   Medical Diagnosis from Referral: M25.511,G89.29 (ICD-10-CM) - Chronic right shoulder pain  Evaluation Date: 7/17/2019  Authorization Period Expiration: 12/31/19  Plan of Care Expiration: 9/11/19  Visit # / Visits authorized: 3/ 30     Time In: 0800  Time Out: 0900  Total Billable Time: 35 minutes     Precautions: Standard    Subjective     Pt reports: that she continues to have tightness/soreness under AC joint.    She was compliant with home exercise program.  Response to previous treatment: no adverse effects  Functional change: n/a    Pain: 5/10  Location: right shoulder      Objective     Adrienne received therapeutic exercises to develop strength, endurance, ROM, flexibility, posture and core stabilization for 55 minutes including:  UBE 4'/4' fwd/bkwd  IR active on wall behind back 2 x 10  Wall clocks OTB 3 x 10 NP  Ball dribble on wall in arc (12-3) 3 x fatigue  Serratus wall slides w/ OTB and FR 3 x 10 NP  GTB ER walk out holds 3 x 30 sec holds  OTB IR walk out holds 6 x 10 sec holds NP  OTB shoulder abduction w/ bkwd lunge 2 x 10 B  GTB Squat to W 3 x 10   Wall push ups on SB 3 x 10  Body blade 2 way - flex/abd 3 x fatigue each       Adrienne participated in dynamic functional therapeutic activities to improve functional performance for 0  minutes, including:  Planks w/ alt shoulder taps NP  Planks (hands on BOSU) w/ alt hip ext NP  [place] carry 25# kettle bell  TrX rows     Adrienne received the following manual therapy techniques: Joint mobilizations and Soft tissue Mobilization were applied to the: R shoulder for 15 minutes.   AC joint mobilizations (AP/PA)   Subscap release  Dry needling to subscap by Jessica Lay, PT,  DPT    Home Exercises Provided and Patient Education Provided     Education provided:   - proper technique for exercises    Written Home Exercises Provided: Patient instructed to cont prior HEP.  Exercises were reviewed and Adrienne was able to demonstrate them prior to the end of the session.  Adrienne demonstrated good  understanding of the education provided.     See EMR under Media for exercises provided at IE.    Assessment     Pt tolerated treatment well today.  Pt's c/c appears to be geared more toward subscapularis as IR ROM and strengthening are the most painful.  Performed subscap release and dry needling to subscap today to further decrease pt's c/o pain.  Performed same exercises today and instructed pt to monitor symptoms to see if there is a change in pain.  Pt with good understanding.    Adrienne is progressing well towards her goals.   Pt prognosis is Good.     Pt will continue to benefit from skilled outpatient physical therapy to address the deficits listed in the problem list box on initial evaluation, provide pt/family education and to maximize pt's level of independence in the home and community environment.     Pt's spiritual, cultural and educational needs considered and pt agreeable to plan of care and goals.    Anticipated barriers to physical therapy: none    GOALS: Short Term Goals:  4 weeks  1.Report decreased R shoulder pain < / =  4/10  to increase tolerance for working out Progressing, not met  2. Increase AROM to WNLs without c/o pain.  Progressing, not met  3. Increased strength by 1/3 MMT grade in R UE to increase tolerance for ADL and work activities. Progressing, not met  4. Pt to tolerate HEP to improve ROM and independence with ADL's.  Progressing, not met     Long Term Goals: 8 weeks  1.Report decreased R shoulder pain  < / =  0-1 /10  to increase tolerance for working out and lifting overhead.  Progressing, not met  2. Increase strength to >/= 5/5 in R UE to increase tolerance for  ADL and work activities.  Progressing, not met  3. Pt goal:  To return to lifting without irritating pain. Progressing, not met  4. Pt will be able to perform overhead squat without c/o pain or difficulty. Progressing, not met       Plan     Continue with established Plan of Care towards PT goals, focusing on shoulder/scapular stabilization.    Mary Peña, PT, DPT, OCS

## 2019-07-29 ENCOUNTER — CLINICAL SUPPORT (OUTPATIENT)
Dept: REHABILITATION | Facility: HOSPITAL | Age: 44
End: 2019-07-29
Attending: PHYSICIAN ASSISTANT
Payer: COMMERCIAL

## 2019-07-29 DIAGNOSIS — G89.29 CHRONIC RIGHT SHOULDER PAIN: ICD-10-CM

## 2019-07-29 DIAGNOSIS — M25.511 CHRONIC RIGHT SHOULDER PAIN: ICD-10-CM

## 2019-07-29 PROCEDURE — 97140 MANUAL THERAPY 1/> REGIONS: CPT

## 2019-07-29 PROCEDURE — 97110 THERAPEUTIC EXERCISES: CPT

## 2019-07-29 PROCEDURE — 97530 THERAPEUTIC ACTIVITIES: CPT

## 2019-07-29 NOTE — PROGRESS NOTES
"  Physical Therapy Daily Treatment Note     Name: Adrienne Andrew  Clinic Number: [id number]    Therapy Diagnosis:   Encounter Diagnosis   Name Primary?    Chronic right shoulder pain      Physician: Hamilton Brown, *    Visit Date: 2019  Physician Orders: PT Eval and Treat   Medical Diagnosis from Referral: M25.511,G89.29 (ICD-10-CM) - Chronic right shoulder pain  Evaluation Date: 2019  Authorization Period Expiration: 19  Plan of Care Expiration: 19  Visit # / Visits authorized: 3/ 30     Time In: 08  Time Out: 09  Total Billable Time: 35 minutes     Precautions: Standard    Subjective     Pt reports: that the dry needling helped.  States that she was pain free for 2 days before it came back.  Would like to try it again.  She was compliant with home exercise program.  Response to previous treatment: no adverse effects  Functional change: n/a    Pain: 4/10  Location: right shoulder      Objective     Adrienne received therapeutic exercises to develop strength, endurance, ROM, flexibility, posture and core stabilization for 30 minutes including:  UBE 4'/4' fwd/bkwd  IR active on wall behind back 2 x 10  Wall clocks in qped GTB 3 x 10   Ball dribble on wall in arc (12-3) 3 x fatigue  Serratus wall slides w/ OTB and FR 3 x 10 NP  OTB ER walk out holds w/ standing punch 3 x 10  OTB IR walk out holds w/ standing punch 3 x 10  GTB shoulder abduction w/ bkwd lunge 2 x 10 B NP  GTB Squat to W 3 x 10 NP  Wall push ups on SB 3 x 10 NP  Body blade 2 way - flex/abd 3 x fatigue each NP      Adrienne participated in dynamic functional therapeutic activities to improve functional performance for 15 minutes, includin rounds:  Planks walk overs 6" x 6  Farmer carry 26# kettle bell x 3 laps  TrX rows x 10      Adrienne received the following manual therapy techniques: Joint mobilizations and Soft tissue Mobilization were applied to the: R shoulder for 10 minutes.   AC joint mobilizations " (AP/PA) NP  Subscap release  Dry needling to subscap by Jessica Lay, PT, DPT    Home Exercises Provided and Patient Education Provided     Education provided:   - proper technique for exercises    Written Home Exercises Provided: Patient instructed to cont prior HEP.  Exercises were reviewed and Adrienne was able to demonstrate them prior to the end of the session.  Adrienne demonstrated good  understanding of the education provided.     See EMR under Media for exercises provided at IE.    Assessment     Pt tolerated treatment well today with progression of exercises. Again, had certified dry needling therapist perform dry needling to subscapularis area in order to further reduce tension and pain.  Decreased pain with IR ROM and strengthening today, as well as decreased muscular tension in subscap.    Adrienne is progressing well towards her goals.   Pt prognosis is Good.     Pt will continue to benefit from skilled outpatient physical therapy to address the deficits listed in the problem list box on initial evaluation, provide pt/family education and to maximize pt's level of independence in the home and community environment.     Pt's spiritual, cultural and educational needs considered and pt agreeable to plan of care and goals.    Anticipated barriers to physical therapy: none    GOALS: Short Term Goals:  4 weeks  1.Report decreased R shoulder pain < / =  4/10  to increase tolerance for working out Progressing, not met  2. Increase AROM to WNLs without c/o pain.  Progressing, not met  3. Increased strength by 1/3 MMT grade in R UE to increase tolerance for ADL and work activities. Progressing, not met  4. Pt to tolerate HEP to improve ROM and independence with ADL's.  Progressing, not met     Long Term Goals: 8 weeks  1.Report decreased R shoulder pain  < / =  0-1 /10  to increase tolerance for working out and lifting overhead.  Progressing, not met  2. Increase strength to >/= 5/5 in R UE to increase tolerance for  ADL and work activities.  Progressing, not met  3. Pt goal:  To return to lifting without irritating pain. Progressing, not met  4. Pt will be able to perform overhead squat without c/o pain or difficulty. Progressing, not met       Plan     Continue with established Plan of Care towards PT goals, focusing on shoulder/scapular stabilization.    Mary Peña, PT, DPT, OCS

## 2019-07-31 ENCOUNTER — TELEPHONE (OUTPATIENT)
Dept: SPORTS MEDICINE | Facility: CLINIC | Age: 44
End: 2019-07-31

## 2019-07-31 DIAGNOSIS — M25.311 SHOULDER INSTABILITY, RIGHT: ICD-10-CM

## 2019-07-31 DIAGNOSIS — M75.21 BICEPS TENDINITIS OF RIGHT UPPER EXTREMITY: Primary | ICD-10-CM

## 2019-07-31 DIAGNOSIS — M25.511 CHRONIC RIGHT SHOULDER PAIN: ICD-10-CM

## 2019-07-31 DIAGNOSIS — G89.29 CHRONIC RIGHT SHOULDER PAIN: ICD-10-CM

## 2019-07-31 NOTE — TELEPHONE ENCOUNTER
Patient reports pain is refractory to CSI, physical therapy, and conservative management.  We have discussed a variety of treatment options including medications, injections, physical therapy and other alternative treatments. She would like to proceed with the MRA of right shoulder to evaluate the labrum and AC Joint.    All of the patient's questions were answered and the patient will contact us if they have any questions or concerns in the interim.

## 2019-08-06 ENCOUNTER — TELEPHONE (OUTPATIENT)
Dept: RADIOLOGY | Facility: HOSPITAL | Age: 44
End: 2019-08-06

## 2019-08-07 ENCOUNTER — HOSPITAL ENCOUNTER (OUTPATIENT)
Dept: RADIOLOGY | Facility: HOSPITAL | Age: 44
Discharge: HOME | End: 2019-08-07
Attending: PHYSICIAN ASSISTANT
Payer: COMMERCIAL

## 2019-08-07 DIAGNOSIS — G89.29 CHRONIC RIGHT SHOULDER PAIN: ICD-10-CM

## 2019-08-07 DIAGNOSIS — M75.21 BICEPS TENDINITIS OF RIGHT UPPER EXTREMITY: ICD-10-CM

## 2019-08-07 DIAGNOSIS — M25.511 CHRONIC RIGHT SHOULDER PAIN: ICD-10-CM

## 2019-08-07 DIAGNOSIS — M25.311 SHOULDER INSTABILITY, RIGHT: ICD-10-CM

## 2019-08-07 PROCEDURE — A9585 GADOBUTROL INJECTION: HCPCS | Performed by: PHYSICIAN ASSISTANT

## 2019-08-07 PROCEDURE — 23350 INJECTION FOR SHOULDER X-RAY: CPT | Mod: RT,,, | Performed by: RADIOLOGY

## 2019-08-07 PROCEDURE — 73040 CONTRAST X-RAY OF SHOULDER: CPT | Mod: TC,RT

## 2019-08-07 PROCEDURE — 73222 MRI ARTHROGRAM SHOULDER WITH CONTRAST RIGHT: ICD-10-PCS | Mod: 26,RT,, | Performed by: RADIOLOGY

## 2019-08-07 PROCEDURE — 73040 CONTRAST X-RAY OF SHOULDER: CPT | Mod: 26,RT,, | Performed by: RADIOLOGY

## 2019-08-07 PROCEDURE — 25500020 PHARM REV CODE 255: Performed by: PHYSICIAN ASSISTANT

## 2019-08-07 PROCEDURE — 23350 XR ARTHROGRAM SHOULDER RIGHT: ICD-10-PCS | Mod: RT,,, | Performed by: RADIOLOGY

## 2019-08-07 PROCEDURE — 73222 MRI JOINT UPR EXTREM W/DYE: CPT | Mod: 26,RT,, | Performed by: RADIOLOGY

## 2019-08-07 PROCEDURE — 73040 XR ARTHROGRAM SHOULDER RIGHT: ICD-10-PCS | Mod: 26,RT,, | Performed by: RADIOLOGY

## 2019-08-07 PROCEDURE — 73222 MRI JOINT UPR EXTREM W/DYE: CPT | Mod: TC,RT

## 2019-08-07 RX ORDER — GADOBUTROL 604.72 MG/ML
7 INJECTION INTRAVENOUS
Status: COMPLETED | OUTPATIENT
Start: 2019-08-07 | End: 2019-08-07

## 2019-08-07 RX ADMIN — IOHEXOL 9 ML: 300 INJECTION, SOLUTION INTRAVENOUS at 01:08

## 2019-08-07 RX ADMIN — GADOBUTROL 7 ML: 604.72 INJECTION INTRAVENOUS at 01:08

## 2019-08-12 ENCOUNTER — OFFICE VISIT (OUTPATIENT)
Dept: SPORTS MEDICINE | Facility: CLINIC | Age: 44
End: 2019-08-12
Payer: COMMERCIAL

## 2019-08-12 VITALS
SYSTOLIC BLOOD PRESSURE: 125 MMHG | HEIGHT: 61 IN | WEIGHT: 129 LBS | DIASTOLIC BLOOD PRESSURE: 78 MMHG | HEART RATE: 71 BPM | BODY MASS INDEX: 24.35 KG/M2

## 2019-08-12 DIAGNOSIS — M75.21 BICEPS TENDONITIS, RIGHT: Primary | ICD-10-CM

## 2019-08-12 DIAGNOSIS — S43.431A SUPERIOR GLENOID LABRUM LESION OF RIGHT SHOULDER, INITIAL ENCOUNTER: ICD-10-CM

## 2019-08-12 PROCEDURE — 99214 OFFICE O/P EST MOD 30 MIN: CPT | Mod: 25,S$GLB,, | Performed by: ORTHOPAEDIC SURGERY

## 2019-08-12 PROCEDURE — 20550 TENDON SHEATH: ICD-10-PCS | Mod: RT,S$GLB,, | Performed by: ORTHOPAEDIC SURGERY

## 2019-08-12 PROCEDURE — 20550 NJX 1 TENDON SHEATH/LIGAMENT: CPT | Mod: RT,S$GLB,, | Performed by: ORTHOPAEDIC SURGERY

## 2019-08-12 PROCEDURE — 3008F BODY MASS INDEX DOCD: CPT | Mod: CPTII,S$GLB,, | Performed by: ORTHOPAEDIC SURGERY

## 2019-08-12 PROCEDURE — 99999 PR PBB SHADOW E&M-EST. PATIENT-LVL III: CPT | Mod: PBBFAC,,, | Performed by: ORTHOPAEDIC SURGERY

## 2019-08-12 PROCEDURE — 3008F PR BODY MASS INDEX (BMI) DOCUMENTED: ICD-10-PCS | Mod: CPTII,S$GLB,, | Performed by: ORTHOPAEDIC SURGERY

## 2019-08-12 PROCEDURE — 99999 PR PBB SHADOW E&M-EST. PATIENT-LVL III: ICD-10-PCS | Mod: PBBFAC,,, | Performed by: ORTHOPAEDIC SURGERY

## 2019-08-12 PROCEDURE — 99214 PR OFFICE/OUTPT VISIT, EST, LEVL IV, 30-39 MIN: ICD-10-PCS | Mod: 25,S$GLB,, | Performed by: ORTHOPAEDIC SURGERY

## 2019-08-12 RX ADMIN — METHYLPREDNISOLONE ACETATE 40 MG: 80 INJECTION, SUSPENSION INTRA-ARTICULAR; INTRALESIONAL; INTRAMUSCULAR; SOFT TISSUE at 10:08

## 2019-08-12 NOTE — PROGRESS NOTES
"CC: right shoulder pain, patient is in woodworking/homemaker/weekend warrior, referred by Ck Brown      43 y.o. Female RHD reports that the pain is severe and not responding to any conservative care.      The pain started 3 months ago with no clear MACK and is becoming progressively worse after dumbbell workout in [employer]  She notes pain with having to reach her hand behind her back  She has stopped doing exercises after she stopped doing her physical therapy     She describes her pain as superior and anterior. It also bothers her at night.    Is affecting ADLs.     She had a subacromial injection with Ck on 7/8/19, she notes 50% relief for a few days  Patient has been attending physical therapy at the [facility] location, working with Mary LAMAS.  She notes that PT made her pain worse  She has taken ibuprofen as needed without much relief and she has used ice        Does report hx of "dislocating joint" on command (possible subluxations) as a kid  Hx of [employer]      Review of Systems   Constitution: Negative. Negative for chills, fever and night sweats.   HENT: Negative for congestion and headaches.    Eyes: Negative for blurred vision, left vision loss and right vision loss.   Cardiovascular: Negative for chest pain and syncope.   Respiratory: Negative for cough and shortness of breath.    Endocrine: Negative for polydipsia, polyphagia and polyuria.   Hematologic/Lymphatic: Negative for bleeding problem. Does not bruise/bleed easily.   Skin: Negative for dry skin, itching and rash.   Musculoskeletal: Negative for falls and muscle weakness.   Gastrointestinal: Negative for abdominal pain and bowel incontinence.   Genitourinary: Negative for bladder incontinence and nocturia.   Neurological: Negative for disturbances in coordination, loss of balance and seizures.   Psychiatric/Behavioral: Negative for depression. The patient does not have insomnia.    Allergic/Immunologic: Negative for hives and " persistent infections.     PAST MEDICAL HISTORY:   Past Medical History:   Diagnosis Date    Anemia      PAST SURGICAL HISTORY:   Past Surgical History:   Procedure Laterality Date    DILATION AND CURETTAGE OF UTERUS      AB    WISDOM TOOTH EXTRACTION       FAMILY HISTORY:   Family History   Problem Relation Age of Onset    Hyperthyroidism Mother     No Known Problems Brother     No Known Problems Daughter     No Known Problems Son     No Known Problems Brother     No Known Problems Brother     No Known Problems Son     Hyperthyroidism Maternal Grandmother     Amblyopia Neg Hx     Blindness Neg Hx     Cancer Neg Hx     Diabetes Neg Hx     Cataracts Neg Hx     Glaucoma Neg Hx     Hypertension Neg Hx     Macular degeneration Neg Hx     Retinal detachment Neg Hx     Strabismus Neg Hx     Stroke Neg Hx     Thyroid disease Neg Hx     Celiac disease Neg Hx     Colon cancer Neg Hx     Crohn's disease Neg Hx     Breast cancer Neg Hx     Ovarian cancer Neg Hx      SOCIAL HISTORY:   Social History     Socioeconomic History    Marital status:      Spouse name: Not on file    Number of children: Not on file    Years of education: Not on file    Highest education level: Not on file   Occupational History    Occupation:    Social Needs    Financial resource strain: Not on file    Food insecurity:     Worry: Not on file     Inability: Not on file    Transportation needs:     Medical: Not on file     Non-medical: Not on file   Tobacco Use    Smoking status: Never Smoker    Smokeless tobacco: Never Used   Substance and Sexual Activity    Alcohol use: Yes     Alcohol/week: 0.0 oz     Comment: socially    Drug use: No    Sexual activity: Yes     Partners: Male     Birth control/protection: None, Partner-Vasectomy   Lifestyle    Physical activity:     Days per week: Not on file     Minutes per session: Not on file    Stress: Not on file   Relationships    Social connections:  "    Talks on phone: Not on file     Gets together: Not on file     Attends Christian service: Not on file     Active member of club or organization: Not on file     Attends meetings of clubs or organizations: Not on file     Relationship status: Not on file   Other Topics Concern    Not on file   Social History Narrative    Originally from [place], [place]    Moved to [facility] in 2011    Living in [facility] with family and         Getting reg exercise        MEDICATIONS:   Current Outpatient Medications:     ferrous sulfate (FEOSOL) 325 mg (65 mg iron) Tab tablet, Take 325 mg by mouth daily with breakfast., Disp: , Rfl:     medroxyPROGESTERone (PROVERA) 10 MG tablet, Take 1 tablet (10 mg total) by mouth once daily., Disp: 30 tablet, Rfl: 11    sulconazole (EXELDERM) 1 % cream, Apply topically once daily. To rash around nose, Disp: 60 g, Rfl: 3  ALLERGIES: Review of patient's allergies indicates:  No Known Allergies    VITAL SIGNS: /78   Pulse 71   Ht 5' 1" (1.549 m)   Wt 58.5 kg (129 lb)   BMI 24.37 kg/m²      PHYSICAL EXAMINATION:  General:  The patient is alert and oriented x 3.  Mood is pleasant.  Observation of ears, eyes and nose reveal no gross abnormalities.  No labored breathing observed.  Gait is coordinated. Patient can toe walk and heel walk without difficulty.      right Shoulder / Upper Extremity Exam    OBSERVATION:     Swelling  none  Deformity  none   Discoloration  none   Scapular winging none   Scars   none  Atrophy  none    TENDERNESS / CREPITUS (T/C):          T/C      T/C   Clavicle   -/-  SUPRAspinatus    -/-     AC Jt.    -/-  INFRAspinatus  -/-    SC Jt.    -/-  Deltoid    -/-      G. Tuberosity  -/-  LH BICEP groove  +/-   Acromion:  -/-  Midline Neck   -/-     Scapular Spine -/-  Trapezium   -/-   SMA Scapula  -/-  GH jt. line - post  -/-     Scapulothoracic  -/-         ROM: (* = with pain)  Right shoulder   Left shoulder        AROM (PROM)   AROM (PROM)   FE    170° (175°)  "    170° (175°)     ER at 0°    60°  (65°)    60°  (65°)   ER at 90° ABD  90°  (90°)    90°  (90°)   IR at 90°  ABD   NA  (40°)     NA  (40°)      IR (spine level)   T10     T10    STRENGTH: (* = with pain) RIGHT SHOULDER  LEFT SHOULDER   SCAPTION at 0  5/5    5/5   SCAPTION at 30  5/5    5/5    IR    5/5    5/5   ER    5/5    5/5   BICEPS   5/5    5/5   Deltoid    5/5    5/5     SIGNS:  Painful side       NEER   +    OABDELRAHMANS  +    PEREZ   neg    SPEEDS  +     DROP ARM   neg   BELLY PRESS neg   Superior escape none    LIFT-OFF  neg   X-Body ADD    neg    MOVING VALGUS neg        STABILITY TESTING    RIGHT SHOULDER   LEFT SHOULDER     Translation     Anterior  up face     up face    Posterior  up face    up face    Sulcus   < 10mm    < 10 mm     Signs   Apprehension   neg      neg       Relocation   no change     no change      Jerk test  neg     neg    EXTREMITY NEURO-VASCULAR EXAM    Sensation grossly intact to light touch all dermatomal regions.    DTR 2+ Biceps, Triceps, BR and Negative Cesars sign   Grossly intact motor function at Elbow, Wrist and Hand   Distal pulses radial and ulnar 2+, brisk cap refill, symmetric.      NECK:  Painless FROM and spinous processes non-tender. Negative Spurlings sign.      OTHER FINDINGS:  + scapular dyskinesia    XRAYS:  Shoulder trauma series right,  were obtained and reviewed  No convincing fracture or dislocation is noted. The osseous structures appear well mineralized and well aligned    MRI:     + SLAP  + biceps tendonitis       ASSESSMENT:   right:  1. Shoulder pain,SLAP   2.  Biceps tendonitis   3.  Scapular dyskinesia    PLAN:    PROCEDURE NOTE:   After cortisone consent and post-injection information was given, the shoulder was prepped with betadyne and alcohol. The right biceps tendon of the shoulder was injected with 2 cc 40 mg kenalog and 4 cc lidocaine and 4 cc .5% marcaine.   Bandaid was applied. Patient was reminded to rest with RICE for 48 hours post  injection and to call clinic immediately for any adverse side effects as explained in clinic today.    75% relief following injection     Patient will hold off on physical therapy at this time, she is to avoid exercises/activity that causes her pain    She will contact me next week to discuss relief.   Should she get prolonged relief we will proceed with conservative management, should she note get prolonged relief we will discuss other treatment options       All questions were answered, pt will contact us for questions or concerns in the interim.

## 2019-08-14 RX ORDER — METHYLPREDNISOLONE ACETATE 80 MG/ML
40 INJECTION, SUSPENSION INTRA-ARTICULAR; INTRALESIONAL; INTRAMUSCULAR; SOFT TISSUE
Status: DISCONTINUED | OUTPATIENT
Start: 2019-08-12 | End: 2019-08-12 | Stop reason: HOSPADM

## 2019-08-14 NOTE — PROCEDURES
JESS velásquez tendonTendon Sheath  Date/Time: 8/12/2019 10:38 AM  Performed by: Charmaine Barr MD  Authorized by: Charmaine Barr MD     Consent Done?: Yes (Verbal)  Timeout: prior to procedure the correct patient, procedure, and site was verified    Indications:  Pain and diagnostic evaluation  Site marked: the procedure site was marked    Timeout: prior to procedure the correct patient, procedure, and site was verified    Location:  Shoulder  Prep: patient was prepped and draped in usual sterile fashion    Needle size:  22 G  Medications:  40 mg methylPREDNISolone acetate 80 mg/mL  Patient tolerance:  Patient tolerated the procedure well with no immediate complications

## 2019-12-09 ENCOUNTER — OFFICE VISIT (OUTPATIENT)
Dept: OPTOMETRY | Facility: CLINIC | Age: 44
End: 2019-12-09
Payer: COMMERCIAL

## 2019-12-09 DIAGNOSIS — H52.13 MYOPIA OF BOTH EYES: Primary | ICD-10-CM

## 2019-12-09 DIAGNOSIS — Z46.0 FITTING AND ADJUSTMENT OF SPECTACLES AND CONTACT LENSES: Primary | ICD-10-CM

## 2019-12-09 DIAGNOSIS — H11.153 PINGUECULA OF BOTH EYES: ICD-10-CM

## 2019-12-09 PROCEDURE — 92015 DETERMINE REFRACTIVE STATE: CPT | Mod: S$GLB,,, | Performed by: OPTOMETRIST

## 2019-12-09 PROCEDURE — 92310 CONTACT LENS FITTING OU: CPT | Mod: CSM,,, | Performed by: OPTOMETRIST

## 2019-12-09 PROCEDURE — 92014 COMPRE OPH EXAM EST PT 1/>: CPT | Mod: S$GLB,,, | Performed by: OPTOMETRIST

## 2019-12-09 PROCEDURE — 99999 PR PBB SHADOW E&M-EST. PATIENT-LVL III: CPT | Mod: PBBFAC,,, | Performed by: OPTOMETRIST

## 2019-12-09 PROCEDURE — 92015 PR REFRACTION: ICD-10-PCS | Mod: S$GLB,,, | Performed by: OPTOMETRIST

## 2019-12-09 PROCEDURE — 92014 PR EYE EXAM, EST PATIENT,COMPREHESV: ICD-10-PCS | Mod: S$GLB,,, | Performed by: OPTOMETRIST

## 2019-12-09 PROCEDURE — 99999 PR PBB SHADOW E&M-EST. PATIENT-LVL III: ICD-10-PCS | Mod: PBBFAC,,, | Performed by: OPTOMETRIST

## 2019-12-09 PROCEDURE — 92310 PR CONTACT LENS FITTING (NO CHANGE): ICD-10-PCS | Mod: CSM,,, | Performed by: OPTOMETRIST

## 2019-12-09 NOTE — PROGRESS NOTES
"HPI     Adrienne Andrew is a 44 y.o. female who returns  for continued eye   care.   Adrienne's last exam with me was on 12/09/2019.  She has bilateral myopia   for which she wears Acuvue Marilu monthly replacement contact lenses. Today   Adrienne reports that she her vision often feels "off" when driving. She   adds that she has been replacing the lenses more frequently than monthly.     (+)blurred vision  (--)Headaches  (--)diplopia  (--)flashes  (--)floaters  (--)pain  (--)Itching  (--)tearing  (--)burning  (+)Dryness  (--) OTC Drops  (--)Photophobia      Last edited by Jung Fuentes, OD on 12/9/2019 12:19 PM. (History)        Review of Systems   Constitutional: Negative for chills, fever and malaise/fatigue.   HENT: Negative for congestion and hearing loss.    Eyes: Positive for blurred vision. Negative for double vision, photophobia, pain, discharge and redness.   Respiratory: Negative.    Cardiovascular: Negative.    Gastrointestinal: Negative.    Genitourinary: Negative.    Musculoskeletal: Negative.    Skin: Negative.    Neurological: Negative for seizures.   Endo/Heme/Allergies: Negative for environmental allergies.   Psychiatric/Behavioral: Negative.        For exam results, see encounter report    Assessment /Plan     1. Myopia of both eyes - 0.25D decrease OU  - Spec Rx per final Rx below  Glasses Prescription (12/9/2019)        Sphere Cylinder    Right -0.75 Sphere    Left -0.75 Sphere    Type:  SVL    Expiration Date:  12/9/2020          2. Poor comfort with  Acuvue Marilu   - Will try:   Biofinity Energys -0.75 OU (monthly replacement)     Dailies Total 1 8.5/14.1 -0.75 OU (daily replacement)    2. Pinguecula of both eyes causing intermittent dryness (contact lens discomfort and blurry vision  - Advised use of Systane nighttime gel at bedtime and artificial tears as needed  -  For redness: brimonidine (LUMIFY) 0.025 % Drop; Place 1 drop into both eyes as needed.  Dispense: 7.5 mL; Refill: 11  - UV " protection    3.  Retinal Health intact OU  - Return to clinic immediately with any new spontaneous flashes of light, a vail of gray, black or other color come over  vision, or any new floaters    Patient education; [facility]       -------------------Addendum 12/23/19----------------------------  Adrienne reports via patient portal that vision with daily disposals was fine, uncomfortable towards end of day; Monthlies --> blurred vision    Trials dispensed:  Biofinity Energys 8.6/14.0  -0.75 OU  -1.00OU    [facility] in 1 week      -------------------Addendum 12/23/19----------------------------  CLRx per below for 2 week disposal/replacement    -Advised against overnight wear, risks of overnight wear explained;     -Optive artificial tears for comfort prn;    -Optifree Puremoist solutions recommended    Spec Rx per final Rx below      -------------------Addendum 12/30/19----------------------------  Contact Lens Prescription (12/9/2019)        Brand Base Curve Diameter Sphere    Right Acuvue Marilu 8.4 14.0 -1.00    Left Acuvue Marilu 8.4 14.0 -1.00    Expiration Date:  12/23/2020    Replacement:  Every 2 weeks    Solutions:  ClearCare, OptiFree PureMoist    Wearing Schedule:  Daily wear

## 2019-12-12 ENCOUNTER — PATIENT MESSAGE (OUTPATIENT)
Dept: OPTOMETRY | Facility: CLINIC | Age: 44
End: 2019-12-12

## 2019-12-16 ENCOUNTER — OFFICE VISIT (OUTPATIENT)
Dept: SPORTS MEDICINE | Facility: CLINIC | Age: 44
End: 2019-12-16
Payer: COMMERCIAL

## 2019-12-16 VITALS
HEIGHT: 61 IN | SYSTOLIC BLOOD PRESSURE: 117 MMHG | DIASTOLIC BLOOD PRESSURE: 73 MMHG | BODY MASS INDEX: 24.35 KG/M2 | WEIGHT: 129 LBS | HEART RATE: 82 BPM

## 2019-12-16 DIAGNOSIS — S43.431D SUPERIOR GLENOID LABRUM LESION OF RIGHT SHOULDER, SUBSEQUENT ENCOUNTER: ICD-10-CM

## 2019-12-16 DIAGNOSIS — M25.511 ACUTE PAIN OF RIGHT SHOULDER: Primary | ICD-10-CM

## 2019-12-16 PROCEDURE — 99999 PR PBB SHADOW E&M-EST. PATIENT-LVL III: CPT | Mod: PBBFAC,,, | Performed by: ORTHOPAEDIC SURGERY

## 2019-12-16 PROCEDURE — 3008F BODY MASS INDEX DOCD: CPT | Mod: CPTII,S$GLB,, | Performed by: ORTHOPAEDIC SURGERY

## 2019-12-16 PROCEDURE — 99999 PR PBB SHADOW E&M-EST. PATIENT-LVL III: ICD-10-PCS | Mod: PBBFAC,,, | Performed by: ORTHOPAEDIC SURGERY

## 2019-12-16 PROCEDURE — 99214 PR OFFICE/OUTPT VISIT, EST, LEVL IV, 30-39 MIN: ICD-10-PCS | Mod: S$GLB,,, | Performed by: ORTHOPAEDIC SURGERY

## 2019-12-16 PROCEDURE — 3008F PR BODY MASS INDEX (BMI) DOCUMENTED: ICD-10-PCS | Mod: CPTII,S$GLB,, | Performed by: ORTHOPAEDIC SURGERY

## 2019-12-16 PROCEDURE — 99214 OFFICE O/P EST MOD 30 MIN: CPT | Mod: S$GLB,,, | Performed by: ORTHOPAEDIC SURGERY

## 2019-12-16 NOTE — PROGRESS NOTES
"CC: right shoulder pain, patient is in woodworking/homemaker/weekend warrior, referred by Ck Brown      44 y.o. Female RHD reports that the pain is severe and not responding to any conservative care.        Interval history     She notes doing well following the biceps tendon injection on 8/12/19   She notes 2.5 months of relief and the injection began to wear off  Within the lat week and a half she notes that her pain is worse     She has been doing limited exercising since she saw us last as things like planking causes pain and she also notes aching with running towards the end of her run         Initial evaluation     The pain started in April 2019 with no clear MACK and is becoming progressively worse after dumbbell workout in [employer]  She notes pain with having to reach her hand behind her back  She has stopped doing exercises after she stopped doing her physical therapy     She describes her pain as superior and anterior. It also bothers her at night.    Is affecting ADLs.     She had a subacromial injection with Ck on 7/8/19, she notes 50% relief for a few days  Patient has been attending physical therapy at the [facility] location, working with Mary LAMAS.  She notes that PT made her pain worse  She has taken ibuprofen as needed without much relief and she has used ice        Does report hx of "dislocating joint" on command (possible subluxations) as a kid  Hx of [employer]    Review of Systems   Constitution: Negative. Negative for chills, fever and night sweats.   HENT: Negative for congestion and headaches.    Eyes: Negative for blurred vision, left vision loss and right vision loss.   Cardiovascular: Negative for chest pain and syncope.   Respiratory: Negative for cough and shortness of breath.    Endocrine: Negative for polydipsia, polyphagia and polyuria.   Hematologic/Lymphatic: Negative for bleeding problem. Does not bruise/bleed easily.   Skin: Negative for dry skin, itching and rash. "   Musculoskeletal: Negative for falls and muscle weakness.   Gastrointestinal: Negative for abdominal pain and bowel incontinence.   Genitourinary: Negative for bladder incontinence and nocturia.   Neurological: Negative for disturbances in coordination, loss of balance and seizures.   Psychiatric/Behavioral: Negative for depression. The patient does not have insomnia.    Allergic/Immunologic: Negative for hives and persistent infections.     PAST MEDICAL HISTORY:   Past Medical History:   Diagnosis Date    Anemia      PAST SURGICAL HISTORY:   Past Surgical History:   Procedure Laterality Date    DILATION AND CURETTAGE OF UTERUS      AB    WISDOM TOOTH EXTRACTION       FAMILY HISTORY:   Family History   Problem Relation Age of Onset    Hyperthyroidism Mother     No Known Problems Brother     No Known Problems Daughter     No Known Problems Son     No Known Problems Brother     No Known Problems Brother     No Known Problems Son     Hyperthyroidism Maternal Grandmother     Amblyopia Neg Hx     Blindness Neg Hx     Cancer Neg Hx     Diabetes Neg Hx     Cataracts Neg Hx     Glaucoma Neg Hx     Hypertension Neg Hx     Macular degeneration Neg Hx     Retinal detachment Neg Hx     Strabismus Neg Hx     Stroke Neg Hx     Thyroid disease Neg Hx     Celiac disease Neg Hx     Colon cancer Neg Hx     Crohn's disease Neg Hx     Breast cancer Neg Hx     Ovarian cancer Neg Hx      SOCIAL HISTORY:   Social History     Socioeconomic History    Marital status:      Spouse name: Not on file    Number of children: Not on file    Years of education: Not on file    Highest education level: Not on file   Occupational History    Occupation:    Social Needs    Financial resource strain: Not on file    Food insecurity:     Worry: Not on file     Inability: Not on file    Transportation needs:     Medical: Not on file     Non-medical: Not on file   Tobacco Use    Smoking status: Never  "Smoker    Smokeless tobacco: Never Used   Substance and Sexual Activity    Alcohol use: Yes     Alcohol/week: 0.0 standard drinks     Comment: socially    Drug use: No    Sexual activity: Yes     Partners: Male     Birth control/protection: None, Partner-Vasectomy   Lifestyle    Physical activity:     Days per week: Not on file     Minutes per session: Not on file    Stress: Not on file   Relationships    Social connections:     Talks on phone: Not on file     Gets together: Not on file     Attends Gnosticist service: Not on file     Active member of club or organization: Not on file     Attends meetings of clubs or organizations: Not on file     Relationship status: Not on file   Other Topics Concern    Not on file   Social History Narrative    Originally from [place], [place]    Moved to [facility] in 2011    Living in [facility] with family and         Getting reg exercise        MEDICATIONS:   Current Outpatient Medications:     brimonidine (LUMIFY) 0.025 % Drop, Place 1 drop into both eyes as needed., Disp: 7.5 mL, Rfl: 11    ferrous sulfate (FEOSOL) 325 mg (65 mg iron) Tab tablet, Take 325 mg by mouth daily with breakfast., Disp: , Rfl:   ALLERGIES: Review of patient's allergies indicates:  No Known Allergies    VITAL SIGNS: /73   Pulse 82   Ht 5' 1" (1.549 m)   Wt 58.5 kg (129 lb)   BMI 24.37 kg/m²      PHYSICAL EXAMINATION:  General:  The patient is alert and oriented x 3.  Mood is pleasant.  Observation of ears, eyes and nose reveal no gross abnormalities.  No labored breathing observed.  Gait is coordinated. Patient can toe walk and heel walk without difficulty.      right Shoulder / Upper Extremity Exam    OBSERVATION:     Swelling  none  Deformity  none   Discoloration  none   Scapular winging none   Scars   none  Atrophy  none    TENDERNESS / CREPITUS (T/C):          T/C      T/C   Clavicle   -/-  SUPRAspinatus    -/-     AC Jt.    -/-  INFRAspinatus  -/-    SC Jt.    -/-  Deltoid    +/-      G. " Tuberosity  -/-  LH BICEP groove  +/-   Acromion:  -/-  Midline Neck   -/-     Scapular Spine -/-  Trapezium   -/-   SMA Scapula  -/-  GH jt. line - post  -/-     Scapulothoracic  -/-         ROM: (* = with pain)  Right shoulder   Left shoulder        AROM (PROM)   AROM (PROM)   FE    170° (175°)     170° (175°)     ER at 0°    60°  (65°)    60°  (65°)   ER at 90° ABD  90°  (90°)    90°  (90°)   IR at 90°  ABD   NA  (40°)     NA  (40°)      IR (spine level)   T11     T10    STRENGTH: (* = with pain) RIGHT SHOULDER  LEFT SHOULDER   SCAPTION at 0  5/5*    5/5   SCAPTION at 30  5/5    5/5    IR    5/5    5/5   ER    5/5    5/5   BICEPS   5/5    5/5   Deltoid    5/5    5/5     SIGNS:  Painful side       NEER   neg    OABDELRAHMANS  +    PEREZ   +    SPEEDS  +     DROP ARM   neg   BELLY PRESS neg   Superior escape none    LIFT-OFF  neg   X-Body ADD    neg    MOVING VALGUS neg        STABILITY TESTING    RIGHT SHOULDER   LEFT SHOULDER     Translation     Anterior  up face     up face    Posterior  up face    up face    Sulcus   < 10mm    < 10 mm     Signs   Apprehension   neg      neg       Relocation   no change     no change      Jerk test  neg     neg    EXTREMITY NEURO-VASCULAR EXAM    Sensation grossly intact to light touch all dermatomal regions.    DTR 2+ Biceps, Triceps, BR and Negative Cesars sign   Grossly intact motor function at Elbow, Wrist and Hand   Distal pulses radial and ulnar 2+, brisk cap refill, symmetric.      NECK:  Painless FROM and spinous processes non-tender. Negative Spurlings sign.      OTHER FINDINGS:  + scapular dyskinesia  Patient unable to perform abduction due to pain, she also notes pain and difficulty with drop arm     XRAYS:  Shoulder trauma series right,  were obtained and reviewed  No convincing fracture or dislocation is noted. The osseous structures appear well mineralized and well aligned    MRI:  + SLAP  + biceps tendonitis       ASSESSMENT:   right:  1. Shoulder  pain,SLAP   2.  Biceps tendonitis   3.  Scapular dyskinesia    PLAN:    Because she has failed conservative management and has new pain compared to when she was seen last we will order a new MRI to assess her rotator cuff to determine surgical plan     All questions were answered, pt will contact us for questions or concerns in the interim.

## 2019-12-18 ENCOUNTER — HOSPITAL ENCOUNTER (OUTPATIENT)
Dept: RADIOLOGY | Facility: HOSPITAL | Age: 44
Discharge: HOME | End: 2019-12-18
Attending: ORTHOPAEDIC SURGERY
Payer: COMMERCIAL

## 2019-12-18 DIAGNOSIS — M25.511 ACUTE PAIN OF RIGHT SHOULDER: ICD-10-CM

## 2019-12-18 DIAGNOSIS — S43.431D SUPERIOR GLENOID LABRUM LESION OF RIGHT SHOULDER, SUBSEQUENT ENCOUNTER: ICD-10-CM

## 2019-12-18 PROCEDURE — 73221 MRI JOINT UPR EXTREM W/O DYE: CPT | Mod: 26,RT,, | Performed by: RADIOLOGY

## 2019-12-18 PROCEDURE — 73221 MRI SHOULDER WITHOUT CONTRAST RIGHT: ICD-10-PCS | Mod: 26,RT,, | Performed by: RADIOLOGY

## 2019-12-18 PROCEDURE — 73221 MRI JOINT UPR EXTREM W/O DYE: CPT | Mod: TC,RT

## 2019-12-20 ENCOUNTER — TELEPHONE (OUTPATIENT)
Dept: SPORTS MEDICINE | Facility: CLINIC | Age: 44
End: 2019-12-20

## 2019-12-20 NOTE — TELEPHONE ENCOUNTER
Called patient discuss her MRI results.  She will call clinic to schedule surgery plan is as follows:    (Updated care plan)        MRI Right shoulder:biceps tendinitis.                   ASSESSMENT:    Right Shoulder  biceps tendonitis.       she would benefit from an arthroscopy, given the above.         PLAN:   Right Shoulder rotator cuff tear      All questions were answered, pt will contact us for questions or concerns in the interim.  Had thorough discussion of non-operative vs operative intervention, and risks and benefits of both.      We have discussed the surgery and recovery of arthroscopic shoulder surgery. she understands that there may be limited mobility up to several weeks after surgery depending on procedures that are performed at the time of surgery.     The spectrum of treatment options were discussed with the patient, including nonoperative and operative options.  After thorough discussion, the patient has elected to undergo surgical treatment to include:     right                            a. Shoulder arthroscopic SAD              b. Shoulder arthroscopic extensive debridement              c. Shoulder open subpec tenodesis               The details of the surgical procedure were explained, including the location of probable incisions and a description of likely hardware and/or grafts to be used.  The patient understands the likely convalescence after surgery.  Also, we have thoroughly discussed the risks, benefits and alternatives to surgery, including, but not limited to, the risk of infection, joint stiffness, blood clot (including DVT and/or pulmonary embolus), neurologic and vascular injury.  It was explained that, if tissue has been repaired or reconstructed, there is a chance of failure, which may require further management.  Consent form for surgery is signed and in chart.     These risks include but are not limited to: bleeding, infection, scarring, re-tear of repair, irreparability of  the tear, damage to neurovascular structures, damage to cartilage, stiffness, blood clots, pulmonary embolism, swelling, compartment syndrome, need for further surgery, and the risks of anesthesia. She verbalized her understanding of these risks and wished to proceed with surgery.   Time was spent face-to-face with the patient during this encounter on counseling about treatment options including surgery and coordination of her care for preoperative visits, surgery and post-operative rehab.      Shai Johnson M.D. PGY3  Orthopaedic Surgery

## 2019-12-20 NOTE — TELEPHONE ENCOUNTER
Called patient discuss her MRI results.  She will call clinic to schedule surgery plan is as follows:      MRI Right shoulder:biceps tendinitis.       ASSESSMENT:    Right Shoulder  biceps tendonitis.      she would benefit from an arthroscopy, given the above.       PLAN:   Right Shoulder rotator cuff tear     All questions were answered, pt will contact us for questions or concerns in the interim.  Had thorough discussion of non-operative vs operative intervention, and risks and benefits of both.     We have discussed the surgery and recovery of arthroscopic shoulder surgery. she understands that there may be limited mobility up to several weeks after surgery depending on procedures that are performed at the time of surgery.    The spectrum of treatment options were discussed with the patient, including nonoperative and operative options.  After thorough discussion, the patient has elected to undergo surgical treatment to include:    right      a. Shoulder arthroscopic SAD   b. Shoulder arthroscopic extensive debridement   c. Shoulder arthroscopic biceps tenodesis (vs. subpect tenodesis)   d. Shoulder arthroscopic possible suprascapular nerve decompression    The details of the surgical procedure were explained, including the location of probable incisions and a description of likely hardware and/or grafts to be used.  The patient understands the likely convalescence after surgery.  Also, we have thoroughly discussed the risks, benefits and alternatives to surgery, including, but not limited to, the risk of infection, joint stiffness, blood clot (including DVT and/or pulmonary embolus), neurologic and vascular injury.  It was explained that, if tissue has been repaired or reconstructed, there is a chance of failure, which may require further management.  Consent form for surgery is signed and in chart.    These risks include but are not limited to: bleeding, infection, scarring, re-tear of repair, irreparability  of the tear, damage to neurovascular structures, damage to cartilage, stiffness, blood clots, pulmonary embolism, swelling, compartment syndrome, need for further surgery, and the risks of anesthesia. She verbalized her understanding of these risks and wished to proceed with surgery.   Time was spent face-to-face with the patient during this encounter on counseling about treatment options including surgery and coordination of her care for preoperative visits, surgery and post-operative rehab.     Shai Johnson M.D. PGY3  Orthopaedic Surgery

## 2019-12-23 ENCOUNTER — TELEPHONE (OUTPATIENT)
Dept: SPORTS MEDICINE | Facility: CLINIC | Age: 44
End: 2019-12-23

## 2019-12-23 ENCOUNTER — PATIENT MESSAGE (OUTPATIENT)
Dept: OPTOMETRY | Facility: CLINIC | Age: 44
End: 2019-12-23

## 2019-12-30 ENCOUNTER — PATIENT MESSAGE (OUTPATIENT)
Dept: INTERNAL MEDICINE | Facility: CLINIC | Age: 44
End: 2019-12-30

## 2019-12-30 ENCOUNTER — TELEPHONE (OUTPATIENT)
Dept: SPORTS MEDICINE | Facility: CLINIC | Age: 44
End: 2019-12-30

## 2019-12-30 DIAGNOSIS — Z01.818 PREOP EXAMINATION: ICD-10-CM

## 2019-12-30 DIAGNOSIS — Z00.00 ANNUAL PHYSICAL EXAM: Primary | ICD-10-CM

## 2019-12-30 DIAGNOSIS — Z13.220 LIPID SCREENING: ICD-10-CM

## 2019-12-30 NOTE — TELEPHONE ENCOUNTER
----- Message from Cristin Evans sent at 12/30/2019  3:22 PM CST -----  Contact: Adrienne       tel:   [phone]   Wants to have the surgery after 1/9th  On a Thursday anytime.    Pls call to discuss this.  Has some questions.

## 2019-12-31 DIAGNOSIS — M75.21 BICEPS TENDINITIS OF RIGHT UPPER EXTREMITY: Primary | ICD-10-CM

## 2019-12-31 DIAGNOSIS — S43.431A SUPERIOR GLENOID LABRUM LESION OF RIGHT SHOULDER, INITIAL ENCOUNTER: ICD-10-CM

## 2019-12-31 DIAGNOSIS — M75.101 ROTATOR CUFF SYNDROME OF RIGHT SHOULDER: ICD-10-CM

## 2019-12-31 DIAGNOSIS — M75.41 SUBACROMIAL IMPINGEMENT OF RIGHT SHOULDER: ICD-10-CM

## 2019-12-31 NOTE — TELEPHONE ENCOUNTER
Spoke to pt and I will have her scheduled for 1/8 @ 11:30. She is wondering if she needs to have any labs done prior to the visit for her pre op, please let me know and I will have her scheduled.

## 2020-01-06 ENCOUNTER — LAB VISIT (OUTPATIENT)
Dept: LAB | Facility: HOSPITAL | Age: 45
End: 2020-01-06
Attending: INTERNAL MEDICINE
Payer: COMMERCIAL

## 2020-01-06 DIAGNOSIS — Z01.818 PREOP EXAMINATION: ICD-10-CM

## 2020-01-06 DIAGNOSIS — Z00.00 ANNUAL PHYSICAL EXAM: ICD-10-CM

## 2020-01-06 DIAGNOSIS — Z13.220 LIPID SCREENING: ICD-10-CM

## 2020-01-06 LAB
ALBUMIN SERPL BCP-MCNC: 3.9 G/DL (ref 3.5–5.2)
ALP SERPL-CCNC: 52 U/L (ref 55–135)
ALT SERPL W/O P-5'-P-CCNC: 24 U/L (ref 10–44)
ANION GAP SERPL CALC-SCNC: 9 MMOL/L (ref 8–16)
AST SERPL-CCNC: 23 U/L (ref 10–40)
BASOPHILS # BLD AUTO: 0.03 K/UL (ref 0–0.2)
BASOPHILS NFR BLD: 0.4 % (ref 0–1.9)
BILIRUB SERPL-MCNC: 0.5 MG/DL (ref 0.1–1)
BUN SERPL-MCNC: 14 MG/DL (ref 6–20)
CALCIUM SERPL-MCNC: 9.5 MG/DL (ref 8.7–10.5)
CHLORIDE SERPL-SCNC: 106 MMOL/L (ref 95–110)
CO2 SERPL-SCNC: 25 MMOL/L (ref 23–29)
CREAT SERPL-MCNC: 0.8 MG/DL (ref 0.5–1.4)
DIFFERENTIAL METHOD: NORMAL
EOSINOPHIL # BLD AUTO: 0.3 K/UL (ref 0–0.5)
EOSINOPHIL NFR BLD: 4.8 % (ref 0–8)
ERYTHROCYTE [DISTWIDTH] IN BLOOD BY AUTOMATED COUNT: 13.7 % (ref 11.5–14.5)
EST. GFR  (AFRICAN AMERICAN): >60 ML/MIN/1.73 M^2
EST. GFR  (NON AFRICAN AMERICAN): >60 ML/MIN/1.73 M^2
GLUCOSE SERPL-MCNC: 85 MG/DL (ref 70–110)
HCT VFR BLD AUTO: 41.3 % (ref 37–48.5)
HGB BLD-MCNC: 13.7 G/DL (ref 12–16)
LYMPHOCYTES # BLD AUTO: 1.4 K/UL (ref 1–4.8)
LYMPHOCYTES NFR BLD: 19.4 % (ref 18–48)
MCH RBC QN AUTO: 30.2 PG (ref 27–31)
MCHC RBC AUTO-ENTMCNC: 33.2 G/DL (ref 32–36)
MCV RBC AUTO: 91 FL (ref 82–98)
MONOCYTES # BLD AUTO: 0.4 K/UL (ref 0.3–1)
MONOCYTES NFR BLD: 5.5 % (ref 4–15)
NEUTROPHILS # BLD AUTO: 4.9 K/UL (ref 1.8–7.7)
NEUTROPHILS NFR BLD: 69.9 % (ref 38–73)
PLATELET # BLD AUTO: 247 K/UL (ref 150–350)
PMV BLD AUTO: 11.3 FL (ref 9.2–12.9)
POTASSIUM SERPL-SCNC: 4.4 MMOL/L (ref 3.5–5.1)
PROT SERPL-MCNC: 7 G/DL (ref 6–8.4)
RBC # BLD AUTO: 4.54 M/UL (ref 4–5.4)
SODIUM SERPL-SCNC: 140 MMOL/L (ref 136–145)
WBC # BLD AUTO: 7.06 K/UL (ref 3.9–12.7)

## 2020-01-06 PROCEDURE — 85025 COMPLETE CBC W/AUTO DIFF WBC: CPT

## 2020-01-06 PROCEDURE — 36415 COLL VENOUS BLD VENIPUNCTURE: CPT

## 2020-01-06 PROCEDURE — 80061 LIPID PANEL: CPT

## 2020-01-06 PROCEDURE — 80053 COMPREHEN METABOLIC PANEL: CPT

## 2020-01-07 LAB
CHOLEST SERPL-MCNC: 180 MG/DL (ref 120–199)
CHOLEST/HDLC SERPL: 2 {RATIO} (ref 2–5)
HDLC SERPL-MCNC: 89 MG/DL (ref 40–75)
HDLC SERPL: 49.4 % (ref 20–50)
LDLC SERPL CALC-MCNC: 81.6 MG/DL (ref 63–159)
NONHDLC SERPL-MCNC: 91 MG/DL
TRIGL SERPL-MCNC: 47 MG/DL (ref 30–150)

## 2020-01-08 ENCOUNTER — OFFICE VISIT (OUTPATIENT)
Dept: INTERNAL MEDICINE | Facility: CLINIC | Age: 45
End: 2020-01-08
Payer: COMMERCIAL

## 2020-01-08 VITALS
WEIGHT: 122.38 LBS | BODY MASS INDEX: 23.11 KG/M2 | SYSTOLIC BLOOD PRESSURE: 120 MMHG | DIASTOLIC BLOOD PRESSURE: 80 MMHG | HEART RATE: 72 BPM | OXYGEN SATURATION: 99 % | HEIGHT: 61 IN

## 2020-01-08 DIAGNOSIS — Z01.818 PREOP EXAMINATION: ICD-10-CM

## 2020-01-08 DIAGNOSIS — Z12.31 SCREENING MAMMOGRAM, ENCOUNTER FOR: ICD-10-CM

## 2020-01-08 DIAGNOSIS — Z00.00 VISIT FOR ANNUAL HEALTH EXAMINATION: Primary | ICD-10-CM

## 2020-01-08 DIAGNOSIS — M25.511 CHRONIC RIGHT SHOULDER PAIN: ICD-10-CM

## 2020-01-08 DIAGNOSIS — G89.29 CHRONIC RIGHT SHOULDER PAIN: ICD-10-CM

## 2020-01-08 DIAGNOSIS — R93.89 ABNORMAL PELVIC ULTRASOUND: ICD-10-CM

## 2020-01-08 PROBLEM — D50.9 IRON DEFICIENCY ANEMIA: Status: RESOLVED | Noted: 2018-10-10 | Resolved: 2020-01-08

## 2020-01-08 PROCEDURE — 93005 ELECTROCARDIOGRAM TRACING: CPT | Mod: S$GLB,,, | Performed by: INTERNAL MEDICINE

## 2020-01-08 PROCEDURE — 93005 EKG 12-LEAD: ICD-10-PCS | Mod: S$GLB,,, | Performed by: INTERNAL MEDICINE

## 2020-01-08 PROCEDURE — 99999 PR PBB SHADOW E&M-EST. PATIENT-LVL IV: CPT | Mod: PBBFAC,,, | Performed by: INTERNAL MEDICINE

## 2020-01-08 PROCEDURE — 93010 ELECTROCARDIOGRAM REPORT: CPT | Mod: S$GLB,,, | Performed by: INTERNAL MEDICINE

## 2020-01-08 PROCEDURE — 99999 PR PBB SHADOW E&M-EST. PATIENT-LVL IV: ICD-10-PCS | Mod: PBBFAC,,, | Performed by: INTERNAL MEDICINE

## 2020-01-08 PROCEDURE — 99396 PREV VISIT EST AGE 40-64: CPT | Mod: S$GLB,,, | Performed by: INTERNAL MEDICINE

## 2020-01-08 PROCEDURE — 99396 PR PREVENTIVE VISIT,EST,40-64: ICD-10-PCS | Mod: S$GLB,,, | Performed by: INTERNAL MEDICINE

## 2020-01-08 PROCEDURE — 93010 EKG 12-LEAD: ICD-10-PCS | Mod: S$GLB,,, | Performed by: INTERNAL MEDICINE

## 2020-01-08 NOTE — Clinical Note
Jim Barr,Ms. Andrew was seen by me today for pre-operative assessment for shoulder surgery which is scheduled with you for 1/16/20.She has no major medical problems and is considered a low-risk patient for an intermediate-risk procedure and does not require any additional cardiac testing at this time.  She has excellent metabolic equivalents.Recent labs are all normal and EKG done in clinic today with normal sinus rhythm.See can see me next year for her next annual, sooner if needed.Thank you for your assistance with her care,Christine Venegas, JULIANection of Internal Medicine/Primary Care

## 2020-01-08 NOTE — PROGRESS NOTES
"INTERNAL MEDICINE ESTABLISHED PATIENT VISIT NOTE    Subjective:     Chief Complaint: Pre-op Exam       Patient ID: Adrienne Andrew is a 44 y.o. female with MICHELLE resolved on last check, hx GI issues c neg w/u for diarrhea in the past and seen by GI but now resolved, last seen by me 10/2018, here today for annual exam and preop assessment.    Has been followed by Dr. Barr for issues c her R shoulder.  Had MRI done which showed mild AC arthrosis and mild bone marrow edema of distal clavicle.  Had failed conservative mgmt so is now scheduled for arthroscopic surgery c debridement and biceps tenodesis and possible suprascapular nerve decompression on 1/16/20.    Denies cp or sob but reports she feels an occasional palpitation in the past when she is under increased stress.  States her  checked her pulse which was normal so did not seem concerned.  No issues recently.    Typically exercises regularly, mostly weight-lifting which has been limited by her shoulder pain.  Can run several miles on the treadmill prior to recent issues c her shoulder.    Of note, pt was seen by gyn last year and had abnormal uterine u/s 12/2018, suspicious for endometriomas vs hemorrhagic cysts.  Says she was put on progesterone for irregular bleeding (cycles were heavy) but says it gave her migraines so she stopped it.  States she prefers more "holistic" mgmt and wanted a second opinion.  Had not gone for f/u and did not get f/u u/s yet.    Past Medical History:  Past Medical History:   Diagnosis Date    Anemia        Home Medications:  Prior to Admission medications    Medication Sig Start Date End Date Taking? Authorizing Provider   brimonidine (LUMIFY) 0.025 % Drop Place 1 drop into both eyes as needed. 12/9/19   Jung Fuentes, OD   ferrous sulfate (FEOSOL) 325 mg (65 mg iron) Tab tablet Take 325 mg by mouth daily with breakfast.    Historical Provider, MD       Allergies:  Review of patient's allergies indicates:  No Known " "Allergies    Social History:  Social History     Tobacco Use    Smoking status: Never Smoker    Smokeless tobacco: Never Used   Substance Use Topics    Alcohol use: Yes     Alcohol/week: 0.0 standard drinks     Comment: socially    Drug use: No        Review of Systems   Constitutional: Positive for activity change. Negative for unexpected weight change.   HENT: Negative for hearing loss, rhinorrhea and trouble swallowing.    Eyes: Negative for discharge and visual disturbance.   Respiratory: Negative for chest tightness and wheezing.    Cardiovascular: Negative for chest pain and palpitations.   Gastrointestinal: Negative for blood in stool, constipation, diarrhea and vomiting.   Endocrine: Negative for polydipsia and polyuria.   Genitourinary: Negative for difficulty urinating, dysuria, hematuria and menstrual problem.   Musculoskeletal: Negative for arthralgias, joint swelling and neck pain.   Neurological: Negative for weakness and headaches.   Psychiatric/Behavioral: Negative for confusion and dysphoric mood.         Health Maintenance:     Immunizations:   Influenza declined  TDap 1/2015  prevnar at 67  Shingrix rec at 52     Cancer Screening:  PAP: 12/2018 c Dr. Lamb, BRYANM c neg HPV, rec f/u c annual WWE  Mammogram: 10/2018 benign, will schedule repeat  Colonoscopy:  rec at 52    Objective:   /80 (BP Location: Right arm, Patient Position: Sitting, BP Method: Medium (Manual))   Pulse 72   Ht 5' 1" (1.549 m)   Wt 55.5 kg (122 lb 5.7 oz)   LMP 12/22/2019   SpO2 99%   BMI 23.12 kg/m²        General: AAO x3, no apparent distress  HEENT: PERRL, OP clear  CV: RRR, no m/r/g  Pulm: Lungs CTAB, no crackles, no wheezes  Abd: s/NT/ND +BS  Extremities: no c/c/e    Labs:     Lab Results   Component Value Date    WBC 7.06 01/06/2020    HGB 13.7 01/06/2020    HCT 41.3 01/06/2020    MCV 91 01/06/2020     01/06/2020     Sodium   Date Value Ref Range Status   01/06/2020 140 136 - 145 mmol/L Final "     Potassium   Date Value Ref Range Status   01/06/2020 4.4 3.5 - 5.1 mmol/L Final     Chloride   Date Value Ref Range Status   01/06/2020 106 95 - 110 mmol/L Final     CO2   Date Value Ref Range Status   01/06/2020 25 23 - 29 mmol/L Final     Glucose   Date Value Ref Range Status   01/06/2020 85 70 - 110 mg/dL Final     BUN, Bld   Date Value Ref Range Status   01/06/2020 14 6 - 20 mg/dL Final     Creatinine   Date Value Ref Range Status   01/06/2020 0.8 0.5 - 1.4 mg/dL Final     Calcium   Date Value Ref Range Status   01/06/2020 9.5 8.7 - 10.5 mg/dL Final     Total Protein   Date Value Ref Range Status   01/06/2020 7.0 6.0 - 8.4 g/dL Final     Albumin   Date Value Ref Range Status   01/06/2020 3.9 3.5 - 5.2 g/dL Final     Total Bilirubin   Date Value Ref Range Status   01/06/2020 0.5 0.1 - 1.0 mg/dL Final     Comment:     For infants and newborns, interpretation of results should be based  on gestational age, weight and in agreement with clinical  observations.  Premature Infant recommended reference ranges:  Up to 24 hours.............<8.0 mg/dL  Up to 48 hours............<12.0 mg/dL  3-5 days..................<15.0 mg/dL  6-29 days.................<15.0 mg/dL       Alkaline Phosphatase   Date Value Ref Range Status   01/06/2020 52 (L) 55 - 135 U/L Final     AST   Date Value Ref Range Status   01/06/2020 23 10 - 40 U/L Final     ALT   Date Value Ref Range Status   01/06/2020 24 10 - 44 U/L Final     Anion Gap   Date Value Ref Range Status   01/06/2020 9 8 - 16 mmol/L Final     eGFR if    Date Value Ref Range Status   01/06/2020 >60.0 >60 mL/min/1.73 m^2 Final     eGFR if non    Date Value Ref Range Status   01/06/2020 >60.0 >60 mL/min/1.73 m^2 Final     Comment:     Calculation used to obtain the estimated glomerular filtration  rate (eGFR) is the CKD-EPI equation.        No results found for: LABA1C, HGBA1C  Lab Results   Component Value Date    LDLCALC 81.6 01/06/2020     Lab  Results   Component Value Date    TSH 1.003 10/11/2018         Assessment/Plan     Adrienne was seen today for pre-op exam.    Diagnoses and all orders for this visit:    Visit for annual health examination  History and physical exam completed.  Health maintenance reviewed as above.  Recent labs reviewed and wnl.  -     Ambulatory referral to Gynecology    Preop examination  Chronic right shoulder pain  Recent labs normal.  EKG today with normal sinus rhythm.  Overall, patient is a low-risk patient for an intermediate-risk procedure and does not require any additional cardiac testing at this time.  She has excellent metabolic equivalents.  Can f/u c me next year for her next annual.  -     IN OFFICE EKG 12-LEAD (to Muse)    Abnormal pelvic ultrasound  As per HPI  Overdue for f/u u/s, will order now and advised to f/u c gyn  -     US Pelvis Comp with Transvag NON-OB (xpd); Future  -     Ambulatory referral to Gynecology    Screening mammogram, encounter for  -     Mammo Digital Screening Bilat w/ [place]; Future      HM as above  RTC in 12 mos, sooner if needed.    Christine Venegas MD  Department of Internal Medicine - [facility]  01/08/2020

## 2020-01-09 NOTE — ANESTHESIA PAT ROS NOTE
01/09/2020  Adrienne Andrew is a 44 y.o., female.      Pre-op Assessment         Review of Systems  Anesthesia Hx:  Hx of Anesthetic complications PONV--Scopalamine patch works well   Social:  Non-Smoker, Alcohol Use    Hematology/Oncology:  Hematology Normal   Oncology Normal     EENT/Dental:EENT/Dental Normal   Cardiovascular:  Cardiovascular Normal   Denies Angina.  Functional Capacity good / => 4 METS  Denies Cardiomyopathy  Denies Deep Venous Thrombosis (DVT)    Pulmonary:  Pulmonary Normal  Denies Asthma.  Denies Shortness of breath.    Renal/:  Renal/ Normal     Hepatic/GI:  Hepatic/GI Normal    Musculoskeletal:  Musculoskeletal Normal    Neurological:  Neurology Normal    Endocrine:  Endocrine Normal    Dermatological:  Skin Normal    Psych:  Psychiatric Normal              Anesthesia Assessment: Preoperative EQUATION    Planned Procedure: Procedure(s) (LRB):  FIXATION, TENDON, bicep tenodesis (Right)  ARTHROSCOPY, SHOULDER, WITH SUBACROMIAL SPACE DECOMPRESSION (Right)  DEBRIDEMENT, SHOULDER, ARTHROSCOPIC (Right)  Requested Anesthesia Type:General  Surgeon: Charmaine Barr MD  Service: Orthopedics  Known or anticipated Date of Surgery:1/16/2020    Surgeon notes: reviewed    Electronic QUestionnaire Assessment completed via nurse interview with patient.        Triage considerations:     The patient has no apparent active cardiac condition (No unstable coronary Syndrome such as severe unstable angina or recent [<1 month] myocardial infarction, decompensated CHF, severe valvular   disease or significant arrhythmia)    Previous anesthesia records:Hx PONV    [facility] 1 month [facility]         Tests already available:  labs EKG 1/8             Instructions given. (See in Nurse's note)    Optimization:     Medical Opinion Indicated PCP 1/8           Plan:     Patient  has  previously scheduled Medical Appointment:    Navigation:  Results will be tracked by Preop Clinic.

## 2020-01-10 ENCOUNTER — PATIENT OUTREACH (OUTPATIENT)
Dept: ADMINISTRATIVE | Facility: OTHER | Age: 45
End: 2020-01-10

## 2020-01-13 ENCOUNTER — OFFICE VISIT (OUTPATIENT)
Dept: SPORTS MEDICINE | Facility: CLINIC | Age: 45
End: 2020-01-13
Payer: COMMERCIAL

## 2020-01-13 VITALS
DIASTOLIC BLOOD PRESSURE: 85 MMHG | BODY MASS INDEX: 24.35 KG/M2 | WEIGHT: 129 LBS | HEART RATE: 75 BPM | HEIGHT: 61 IN | SYSTOLIC BLOOD PRESSURE: 124 MMHG

## 2020-01-13 DIAGNOSIS — M75.21 BICEPS TENDINITIS OF RIGHT UPPER EXTREMITY: Primary | ICD-10-CM

## 2020-01-13 DIAGNOSIS — M75.41 SUBACROMIAL IMPINGEMENT OF RIGHT SHOULDER: ICD-10-CM

## 2020-01-13 DIAGNOSIS — M75.101 ROTATOR CUFF SYNDROME OF RIGHT SHOULDER: ICD-10-CM

## 2020-01-13 DIAGNOSIS — G89.18 POST-OPERATIVE PAIN: ICD-10-CM

## 2020-01-13 PROCEDURE — 99499 NO LOS: ICD-10-PCS | Mod: S$GLB,,, | Performed by: PHYSICIAN ASSISTANT

## 2020-01-13 PROCEDURE — 99999 PR PBB SHADOW E&M-EST. PATIENT-LVL III: CPT | Mod: PBBFAC,,, | Performed by: PHYSICIAN ASSISTANT

## 2020-01-13 PROCEDURE — 99999 PR PBB SHADOW E&M-EST. PATIENT-LVL III: ICD-10-PCS | Mod: PBBFAC,,, | Performed by: PHYSICIAN ASSISTANT

## 2020-01-13 PROCEDURE — 99499 UNLISTED E&M SERVICE: CPT | Mod: S$GLB,,, | Performed by: PHYSICIAN ASSISTANT

## 2020-01-13 RX ORDER — SODIUM CHLORIDE 9 MG/ML
INJECTION, SOLUTION INTRAVENOUS CONTINUOUS
Status: CANCELLED | OUTPATIENT
Start: 2020-01-13

## 2020-01-13 RX ORDER — TRAMADOL HYDROCHLORIDE 50 MG/1
50-100 TABLET ORAL EVERY 6 HOURS PRN
Qty: 21 TABLET | Refills: 0 | Status: SHIPPED | OUTPATIENT
Start: 2020-01-13 | End: 2020-05-25

## 2020-01-13 RX ORDER — PROMETHAZINE HYDROCHLORIDE 25 MG/1
25 TABLET ORAL EVERY 6 HOURS PRN
Qty: 12 TABLET | Refills: 0 | Status: SHIPPED | OUTPATIENT
Start: 2020-01-13 | End: 2020-05-25

## 2020-01-13 RX ORDER — PREGABALIN 25 MG/1
75 CAPSULE ORAL
Status: CANCELLED | OUTPATIENT
Start: 2020-01-13 | End: 2020-01-13

## 2020-01-13 RX ORDER — ASPIRIN 81 MG/1
81 TABLET ORAL DAILY
Qty: 28 TABLET | Refills: 0 | COMMUNITY
Start: 2020-01-13 | End: 2020-05-25

## 2020-01-13 NOTE — H&P (VIEW-ONLY)
Adrienne Andrew  is here for a completion of her perioperative paperwork. she  Is scheduled to undergo     right                 a. Shoulder arthroscopic SAD              b. Shoulder arthroscopic extensive debridement              c. Shoulder open subpec tenodesis on 1/16/20.      She is a healthy individual and does need clearance for this procedure which she has received from Dr. Venegas at [facility].     PAST MEDICAL HISTORY:   Past Medical History:   Diagnosis Date    Anemia      PAST SURGICAL HISTORY:   Past Surgical History:   Procedure Laterality Date    DILATION AND CURETTAGE OF UTERUS      AB    WISDOM TOOTH EXTRACTION       FAMILY HISTORY:   Family History   Problem Relation Age of Onset    Hyperthyroidism Mother     No Known Problems Brother     No Known Problems Daughter     No Known Problems Son     No Known Problems Brother     No Known Problems Brother     No Known Problems Son     Hyperthyroidism Maternal Grandmother     Amblyopia Neg Hx     Blindness Neg Hx     Cancer Neg Hx     Diabetes Neg Hx     Cataracts Neg Hx     Glaucoma Neg Hx     Hypertension Neg Hx     Macular degeneration Neg Hx     Retinal detachment Neg Hx     Strabismus Neg Hx     Stroke Neg Hx     Thyroid disease Neg Hx     Celiac disease Neg Hx     Colon cancer Neg Hx     Crohn's disease Neg Hx     Breast cancer Neg Hx     Ovarian cancer Neg Hx      SOCIAL HISTORY:   Social History     Socioeconomic History    Marital status:      Spouse name: Not on file    Number of children: Not on file    Years of education: Not on file    Highest education level: Not on file   Occupational History    Occupation:    Social Needs    Financial resource strain: Not on file    Food insecurity:     Worry: Not on file     Inability: Not on file    Transportation needs:     Medical: Not on file     Non-medical: Not on file   Tobacco Use    Smoking status: Never Smoker    Smokeless tobacco:  "Never Used   Substance and Sexual Activity    Alcohol use: Yes     Alcohol/week: 0.0 standard drinks     Comment: socially    Drug use: No    Sexual activity: Yes     Partners: Male     Birth control/protection: None, Partner-Vasectomy   Lifestyle    Physical activity:     Days per week: 3 days     Minutes per session: 40 min    Stress: Rather much   Relationships    Social connections:     Talks on phone: Not on file     Gets together: Not on file     Attends Faith service: Not on file     Active member of club or organization: Not on file     Attends meetings of clubs or organizations: Not on file     Relationship status: Not on file   Other Topics Concern    Not on file   Social History Narrative    Originally from [place], [place]    Moved to [facility] in 2011    Living in [facility] with family and         Getting reg exercise        MEDICATIONS:   Current Outpatient Medications:     brimonidine (LUMIFY) 0.025 % Drop, Place 1 drop into both eyes as needed., Disp: 7.5 mL, Rfl: 11    ferrous sulfate (FEOSOL) 325 mg (65 mg iron) Tab tablet, Take 325 mg by mouth daily with breakfast., Disp: , Rfl:     aspirin (ECOTRIN) 81 MG EC tablet, Take 1 tablet (81 mg total) by mouth once daily. For 4 weeks starting the day after surgery., Disp: 28 tablet, Rfl: 0    promethazine (PHENERGAN) 25 MG tablet, Take 1 tablet (25 mg total) by mouth every 6 (six) hours as needed for Nausea., Disp: 12 tablet, Rfl: 0    traMADol (ULTRAM) 50 mg tablet, Take 1-2 tablets ( mg total) by mouth every 6 (six) hours as needed., Disp: 21 tablet, Rfl: 0  ALLERGIES: Review of patient's allergies indicates:  No Known Allergies    VITAL SIGNS: /85   Pulse 75   Ht 5' 1" (1.549 m)   Wt 58.5 kg (129 lb)   LMP 12/22/2019   BMI 24.37 kg/m²      Risks, indications and benefits of the surgical procedure were discussed with the patient. All questions with regard to surgery, rehab, expected return to functional activities, activities of " daily living and recreational endeavors were answered to her satisfaction.    It was explained to the patient that there may be an increase in surgical risks if the patient has certain co-morbidities such as but not limited to: Obesity, Cardiovascular issues (CHF, CAD, Arrhythmias), chronic pulmonary issues, previous or current neurovascular/neurological issues, previous strokes, diabetes mellitus, previous wound healing issues, previous wound or skin infections, PVD, clotting disorders, if the patient uses chronic steroids, if the patient takes or has immune compromising medications or diseases, or has previously or currently used tobacco products.     The patient verbalized that he/she does not have any additional clotting, bleeding, or blood disorders, other than what is list in her chart on today's review.     Then a brief history and physical exam were performed.    Review of Systems   Constitution: Negative. Negative for chills, fever and night sweats.   HENT: Negative for congestion and headaches.    Eyes: Negative for blurred vision, left vision loss and right vision loss.   Cardiovascular: Negative for chest pain and syncope.   Respiratory: Negative for cough and shortness of breath.    Endocrine: Negative for polydipsia, polyphagia and polyuria.   Hematologic/Lymphatic: Negative for bleeding problem. Does not bruise/bleed easily.   Skin: Negative for dry skin, itching and rash.   Musculoskeletal: Negative for falls and muscle weakness.   Gastrointestinal: Negative for abdominal pain and bowel incontinence.   Genitourinary: Negative for bladder incontinence and nocturia.   Neurological: Negative for disturbances in coordination, loss of balance and seizures.   Psychiatric/Behavioral: Negative for depression. The patient does not have insomnia.    Allergic/Immunologic: Negative for hives and persistent infections.     PHYSICAL EXAM:  GEN: A&Ox3, WD WN NAD  HEENT: WNL  CHEST: CTAB, no W/R/R  HEART: RRR, no  M/R/G  ABD: Soft, NT ND, BS x4 QUADS  MS; See Epic  NEURO: CN II-XII intact       The surgical consent was then reviewed with the patient, who agreed with all the contents of the consent form and it was signed. she was then given the surgery packet to bring with her to surgery center for the anesthesia portion of her perioperative paperwork (if needed)   For all physicians except for Dr. Birch, we will email and possibly fax the consent forms and booking sheets to [facility].    The patient was given the opportunity to ask questions about the surgical plan and consent form, and once no other questions were asked, I proceeded with the pre-op appointment.    PHYSICAL THERAPY:  She was also instructed regarding physical therapy and will begin on  The week after surgery. She was given a copy of the original prescription to schedule. [facility]    POST OP CARE:instructions were reviewed including care of the wound and dressing after surgery and when she can shower.     CRUTCHES OR WALKER: It was explained to the patient that if they are having a lower extremity surgery that they will require either a walker or crutches to ambulate safely with after surgery. It was explained that a cane or other assistive devices are not sufficient to safely ambulate with after surgery. I explained to the patient that I will place an order for them to receive either crutches or a walker after surgery to go home with. It was explained that if they have crutches or a walker at home already, that they are REQUIRED to bring them to the hospital on the day of surgery. It was explained that if they do not have them at the hospital on the day of surgery that they WILL be provided a new pair or crutches or a walker to go home with to ensure ambulation will be safe if the patient needs to stop somewhere on the way home.      PAIN MANAGEMENT: Adrienne Andrew was  also given a pain management regime, which includes the TENS unit given to her by jhon along with the education required for its use. She was also instructed regarding the Polar ice unit that will be in place after surgery and her postoperative pain medications.     PAIN MEDICATION:  Patient declines percocet and norco today.   Ultram 50 mg Take 1-2 p.o. q.6 hours p.r.n. breakthrough pain,   Phenergan 25 mg one p.o. q.6 hours p.r.n. nausea and vomiting.    DVT prophylaxis was discussed with the patient today including risk factors for developing DVTs and history of DVTs. The patient was asked if any specific recommendations were given from the doctor/s that did pre-operative surgical clearance. The patient was then given an education sheet about DVTs and PE with warning signs and symptoms of both and steps to take if they suspect either of these.    This along with the Modified Caprini risk assessment model for VTE in general surgical patients was used to determine the patient's DVT risk.     From: Ritesh MK, Nirmal DA, Milly SM, et al. Prevention of VTE in nonorthopedic surgical patients: antithrombotic therapy and prevention of thrombosis, 9th ed: American College of Chest Physicians evidence-based clinical practical guidelines. Chest 2012; 141:e227S. Copyright © 2012. Reproduced with permission from the American College of Chest Physicians.    The below listed DVT prophylaxis regimen along with bilateral RAOUL compression stockings will be used post-op. Length of treatment has been determined to be 10-42 days post-op by the above noted Caprini assessment model.     The patient was instructed to buy and take:  Aspirin 81mg QD x 4 weeks for DVT prophylaxis starting on the morning after surgery.  Patient will also use bilateral TEDs on lower extremities, SCDs during surgery, and early ambulation post-op. If the patient was previously taking 81mg baby aspirin, they were told to not take it starting 5 days prior to  surgery and to restart the 81mg aspirin after surgery.       Patient was also told to buy over the counter Prilosec medication if needed and take it once daily for GI protection as long as they are taking NSAIDs or Aspirin.    Patient denies history of seizures.     The patient was told that narcotic pain medications may make them drowsy and instructions were given to not sign legal documents, drive or operate heavy machinery, cars, or equipment while under the influence of narcotic medications. The patient was told and understands that narcotic pain medications should only be used as needed to control pain and that other options of pain control include TENs unit and ice packs/unit.     As there were no other questions to be asked, she was given my business card along with Charmaine Barr MD business card if she has any questions or concerns prior to surgery or in the postop period.     Patient was asked if they were taking or using OCP pills or devices. If they answered yes, then they were instructed to stop using OCPs at this pre-operative appointment until 2 months post-op to help prevent DVT development. They understand that there are other forms of birth control that do not involve hormones. They expressed understanding that ignoring/not following this instruction could result in a DVT which could turn into a deadly pulmonary embolism.

## 2020-01-13 NOTE — H&P
Adrienne Andrew  is here for a completion of her perioperative paperwork. she  Is scheduled to undergo     right                 a. Shoulder arthroscopic SAD              b. Shoulder arthroscopic extensive debridement              c. Shoulder open subpec tenodesis on 1/16/20.      She is a healthy individual and does need clearance for this procedure which she has received from Dr. Venegas at [facility].     PAST MEDICAL HISTORY:   Past Medical History:   Diagnosis Date    Anemia      PAST SURGICAL HISTORY:   Past Surgical History:   Procedure Laterality Date    DILATION AND CURETTAGE OF UTERUS      AB    WISDOM TOOTH EXTRACTION       FAMILY HISTORY:   Family History   Problem Relation Age of Onset    Hyperthyroidism Mother     No Known Problems Brother     No Known Problems Daughter     No Known Problems Son     No Known Problems Brother     No Known Problems Brother     No Known Problems Son     Hyperthyroidism Maternal Grandmother     Amblyopia Neg Hx     Blindness Neg Hx     Cancer Neg Hx     Diabetes Neg Hx     Cataracts Neg Hx     Glaucoma Neg Hx     Hypertension Neg Hx     Macular degeneration Neg Hx     Retinal detachment Neg Hx     Strabismus Neg Hx     Stroke Neg Hx     Thyroid disease Neg Hx     Celiac disease Neg Hx     Colon cancer Neg Hx     Crohn's disease Neg Hx     Breast cancer Neg Hx     Ovarian cancer Neg Hx      SOCIAL HISTORY:   Social History     Socioeconomic History    Marital status:      Spouse name: Not on file    Number of children: Not on file    Years of education: Not on file    Highest education level: Not on file   Occupational History    Occupation:    Social Needs    Financial resource strain: Not on file    Food insecurity:     Worry: Not on file     Inability: Not on file    Transportation needs:     Medical: Not on file     Non-medical: Not on file   Tobacco Use    Smoking status: Never Smoker    Smokeless tobacco:  "Never Used   Substance and Sexual Activity    Alcohol use: Yes     Alcohol/week: 0.0 standard drinks     Comment: socially    Drug use: No    Sexual activity: Yes     Partners: Male     Birth control/protection: None, Partner-Vasectomy   Lifestyle    Physical activity:     Days per week: 3 days     Minutes per session: 40 min    Stress: Rather much   Relationships    Social connections:     Talks on phone: Not on file     Gets together: Not on file     Attends [employer] service: Not on file     Active member of club or organization: Not on file     Attends meetings of clubs or organizations: Not on file     Relationship status: Not on file   Other Topics Concern    Not on file   Social History Narrative    Originally from [place], [place]    Moved to [facility] in 2011    Living in [facility] with family and         Getting reg exercise        MEDICATIONS:   Current Outpatient Medications:     brimonidine (LUMIFY) 0.025 % Drop, Place 1 drop into both eyes as needed., Disp: 7.5 mL, Rfl: 11    ferrous sulfate (FEOSOL) 325 mg (65 mg iron) Tab tablet, Take 325 mg by mouth daily with breakfast., Disp: , Rfl:     aspirin (ECOTRIN) 81 MG EC tablet, Take 1 tablet (81 mg total) by mouth once daily. For 4 weeks starting the day after surgery., Disp: 28 tablet, Rfl: 0    promethazine (PHENERGAN) 25 MG tablet, Take 1 tablet (25 mg total) by mouth every 6 (six) hours as needed for Nausea., Disp: 12 tablet, Rfl: 0    traMADol (ULTRAM) 50 mg tablet, Take 1-2 tablets ( mg total) by mouth every 6 (six) hours as needed., Disp: 21 tablet, Rfl: 0  ALLERGIES: Review of patient's allergies indicates:  No Known Allergies    VITAL SIGNS: /85   Pulse 75   Ht 5' 1" (1.549 m)   Wt 58.5 kg (129 lb)   LMP 12/22/2019   BMI 24.37 kg/m²      Risks, indications and benefits of the surgical procedure were discussed with the patient. All questions with regard to surgery, rehab, expected return to functional activities, activities of " daily living and recreational endeavors were answered to her satisfaction.    It was explained to the patient that there may be an increase in surgical risks if the patient has certain co-morbidities such as but not limited to: Obesity, Cardiovascular issues (CHF, CAD, Arrhythmias), chronic pulmonary issues, previous or current neurovascular/neurological issues, previous strokes, diabetes mellitus, previous wound healing issues, previous wound or skin infections, PVD, clotting disorders, if the patient uses chronic steroids, if the patient takes or has immune compromising medications or diseases, or has previously or currently used tobacco products.     The patient verbalized that he/she does not have any additional clotting, bleeding, or blood disorders, other than what is list in her chart on today's review.     Then a brief history and physical exam were performed.    Review of Systems   Constitution: Negative. Negative for chills, fever and night sweats.   HENT: Negative for congestion and headaches.    Eyes: Negative for blurred vision, left vision loss and right vision loss.   Cardiovascular: Negative for chest pain and syncope.   Respiratory: Negative for cough and shortness of breath.    Endocrine: Negative for polydipsia, polyphagia and polyuria.   Hematologic/Lymphatic: Negative for bleeding problem. Does not bruise/bleed easily.   Skin: Negative for dry skin, itching and rash.   Musculoskeletal: Negative for falls and muscle weakness.   Gastrointestinal: Negative for abdominal pain and bowel incontinence.   Genitourinary: Negative for bladder incontinence and nocturia.   Neurological: Negative for disturbances in coordination, loss of balance and seizures.   Psychiatric/Behavioral: Negative for depression. The patient does not have insomnia.    Allergic/Immunologic: Negative for hives and persistent infections.     PHYSICAL EXAM:  GEN: A&Ox3, WD WN NAD  HEENT: WNL  CHEST: CTAB, no W/R/R  HEART: RRR, no  M/R/G  ABD: Soft, NT ND, BS x4 QUADS  MS; See Epic  NEURO: CN II-XII intact       The surgical consent was then reviewed with the patient, who agreed with all the contents of the consent form and it was signed. she was then given the surgery packet to bring with her to surgery center for the anesthesia portion of her perioperative paperwork (if needed)   For all physicians except for Dr. Birch, we will email and possibly fax the consent forms and booking sheets to [facility].    The patient was given the opportunity to ask questions about the surgical plan and consent form, and once no other questions were asked, I proceeded with the pre-op appointment.    PHYSICAL THERAPY:  She was also instructed regarding physical therapy and will begin on  The week after surgery. She was given a copy of the original prescription to schedule. [facility]    POST OP CARE:instructions were reviewed including care of the wound and dressing after surgery and when she can shower.     CRUTCHES OR WALKER: It was explained to the patient that if they are having a lower extremity surgery that they will require either a walker or crutches to ambulate safely with after surgery. It was explained that a cane or other assistive devices are not sufficient to safely ambulate with after surgery. I explained to the patient that I will place an order for them to receive either crutches or a walker after surgery to go home with. It was explained that if they have crutches or a walker at home already, that they are REQUIRED to bring them to the hospital on the day of surgery. It was explained that if they do not have them at the hospital on the day of surgery that they WILL be provided a new pair or crutches or a walker to go home with to ensure ambulation will be safe if the patient needs to stop somewhere on the way home.      PAIN MANAGEMENT: Adrienne Andrew was  also given a pain management regime, which includes the TENS unit given to her by jhon along with the education required for its use. She was also instructed regarding the Polar ice unit that will be in place after surgery and her postoperative pain medications.     PAIN MEDICATION:  Patient declines percocet and norco today.   Ultram 50 mg Take 1-2 p.o. q.6 hours p.r.n. breakthrough pain,   Phenergan 25 mg one p.o. q.6 hours p.r.n. nausea and vomiting.    DVT prophylaxis was discussed with the patient today including risk factors for developing DVTs and history of DVTs. The patient was asked if any specific recommendations were given from the doctor/s that did pre-operative surgical clearance. The patient was then given an education sheet about DVTs and PE with warning signs and symptoms of both and steps to take if they suspect either of these.    This along with the Modified Caprini risk assessment model for VTE in general surgical patients was used to determine the patient's DVT risk.     From: Ritesh MK, Nirmal DA, Milly SM, et al. Prevention of VTE in nonorthopedic surgical patients: antithrombotic therapy and prevention of thrombosis, 9th ed: American College of Chest Physicians evidence-based clinical practical guidelines. Chest 2012; 141:e227S. Copyright © 2012. Reproduced with permission from the American College of Chest Physicians.    The below listed DVT prophylaxis regimen along with bilateral RAOUL compression stockings will be used post-op. Length of treatment has been determined to be 10-42 days post-op by the above noted Caprini assessment model.     The patient was instructed to buy and take:  Aspirin 81mg QD x 4 weeks for DVT prophylaxis starting on the morning after surgery.  Patient will also use bilateral TEDs on lower extremities, SCDs during surgery, and early ambulation post-op. If the patient was previously taking 81mg baby aspirin, they were told to not take it starting 5 days prior to  surgery and to restart the 81mg aspirin after surgery.       Patient was also told to buy over the counter Prilosec medication if needed and take it once daily for GI protection as long as they are taking NSAIDs or Aspirin.    Patient denies history of seizures.     The patient was told that narcotic pain medications may make them drowsy and instructions were given to not sign legal documents, drive or operate heavy machinery, cars, or equipment while under the influence of narcotic medications. The patient was told and understands that narcotic pain medications should only be used as needed to control pain and that other options of pain control include TENs unit and ice packs/unit.     As there were no other questions to be asked, she was given my business card along with Charmaine Barr MD business card if she has any questions or concerns prior to surgery or in the postop period.     Patient was asked if they were taking or using OCP pills or devices. If they answered yes, then they were instructed to stop using OCPs at this pre-operative appointment until 2 months post-op to help prevent DVT development. They understand that there are other forms of birth control that do not involve hormones. They expressed understanding that ignoring/not following this instruction could result in a DVT which could turn into a deadly pulmonary embolism.

## 2020-01-15 ENCOUNTER — TELEPHONE (OUTPATIENT)
Dept: SPORTS MEDICINE | Facility: CLINIC | Age: 45
End: 2020-01-15

## 2020-01-15 NOTE — TELEPHONE ENCOUNTER
----- Message from London Smith sent at 1/15/2020  1:05 PM CST -----  Contact: pt @ [phone]  Pt is calling to confirm time of surgery tomorrow

## 2020-01-16 ENCOUNTER — HOSPITAL ENCOUNTER (OUTPATIENT)
Facility: HOSPITAL | Age: 45
Discharge: HOME | End: 2020-01-16
Attending: ORTHOPAEDIC SURGERY | Admitting: ORTHOPAEDIC SURGERY
Payer: COMMERCIAL

## 2020-01-16 ENCOUNTER — ANESTHESIA EVENT (OUTPATIENT)
Dept: SURGERY | Facility: HOSPITAL | Age: 45
End: 2020-01-16
Payer: COMMERCIAL

## 2020-01-16 ENCOUNTER — TELEPHONE (OUTPATIENT)
Dept: SPORTS MEDICINE | Facility: CLINIC | Age: 45
End: 2020-01-16

## 2020-01-16 ENCOUNTER — ANESTHESIA (OUTPATIENT)
Dept: SURGERY | Facility: HOSPITAL | Age: 45
End: 2020-01-16
Payer: COMMERCIAL

## 2020-01-16 DIAGNOSIS — G89.18 POST-OPERATIVE PAIN: ICD-10-CM

## 2020-01-16 DIAGNOSIS — M75.41 SUBACROMIAL IMPINGEMENT OF RIGHT SHOULDER: ICD-10-CM

## 2020-01-16 DIAGNOSIS — M75.101 ROTATOR CUFF SYNDROME OF RIGHT SHOULDER: ICD-10-CM

## 2020-01-16 DIAGNOSIS — M75.21 BICEPS TENDINITIS OF RIGHT UPPER EXTREMITY: ICD-10-CM

## 2020-01-16 DIAGNOSIS — M75.21 BICEPS TENDINITIS OF RIGHT UPPER EXTREMITY: Primary | ICD-10-CM

## 2020-01-16 LAB
B-HCG UR QL: NEGATIVE
CTP QC/QA: YES

## 2020-01-16 PROCEDURE — 71000015 HC POSTOP RECOV 1ST HR: Performed by: ORTHOPAEDIC SURGERY

## 2020-01-16 PROCEDURE — 63600175 PHARM REV CODE 636 W HCPCS: Performed by: ANESTHESIOLOGY

## 2020-01-16 PROCEDURE — 37000009 HC ANESTHESIA EA ADD 15 MINS: Performed by: ORTHOPAEDIC SURGERY

## 2020-01-16 PROCEDURE — S0028 INJECTION, FAMOTIDINE, 20 MG: HCPCS | Performed by: NURSE ANESTHETIST, CERTIFIED REGISTERED

## 2020-01-16 PROCEDURE — 25000003 PHARM REV CODE 250: Performed by: ORTHOPAEDIC SURGERY

## 2020-01-16 PROCEDURE — 25000003 PHARM REV CODE 250

## 2020-01-16 PROCEDURE — D9220A PRA ANESTHESIA: Mod: CRNA,,, | Performed by: NURSE ANESTHETIST, CERTIFIED REGISTERED

## 2020-01-16 PROCEDURE — 25000003 PHARM REV CODE 250: Performed by: NURSE ANESTHETIST, CERTIFIED REGISTERED

## 2020-01-16 PROCEDURE — D9220A PRA ANESTHESIA: Mod: ANES,,, | Performed by: ANESTHESIOLOGY

## 2020-01-16 PROCEDURE — 63600175 PHARM REV CODE 636 W HCPCS: Performed by: ORTHOPAEDIC SURGERY

## 2020-01-16 PROCEDURE — D9220A PRA ANESTHESIA: ICD-10-PCS | Mod: ANES,,, | Performed by: ANESTHESIOLOGY

## 2020-01-16 PROCEDURE — 81025 URINE PREGNANCY TEST: CPT | Performed by: PHYSICIAN ASSISTANT

## 2020-01-16 PROCEDURE — 63600175 PHARM REV CODE 636 W HCPCS: Performed by: PHYSICIAN ASSISTANT

## 2020-01-16 PROCEDURE — 23430 REPAIR BICEPS TENDON: CPT | Mod: RT,,, | Performed by: ORTHOPAEDIC SURGERY

## 2020-01-16 PROCEDURE — 25000003 PHARM REV CODE 250: Performed by: PHYSICIAN ASSISTANT

## 2020-01-16 PROCEDURE — 37000008 HC ANESTHESIA 1ST 15 MINUTES: Performed by: ORTHOPAEDIC SURGERY

## 2020-01-16 PROCEDURE — 36000711: Performed by: ORTHOPAEDIC SURGERY

## 2020-01-16 PROCEDURE — 25000003 PHARM REV CODE 250: Performed by: ANESTHESIOLOGY

## 2020-01-16 PROCEDURE — S0077 INJECTION, CLINDAMYCIN PHOSP: HCPCS | Performed by: PHYSICIAN ASSISTANT

## 2020-01-16 PROCEDURE — 23430 PR REPAIR BICEPS LONG TENDON: ICD-10-PCS | Mod: RT,,, | Performed by: ORTHOPAEDIC SURGERY

## 2020-01-16 PROCEDURE — C1713 ANCHOR/SCREW BN/BN,TIS/BN: HCPCS | Performed by: ORTHOPAEDIC SURGERY

## 2020-01-16 PROCEDURE — 63600175 PHARM REV CODE 636 W HCPCS: Performed by: NURSE ANESTHETIST, CERTIFIED REGISTERED

## 2020-01-16 PROCEDURE — 29823 SHO ARTHRS SRG XTNSV DBRDMT: CPT | Mod: 51,RT,, | Performed by: ORTHOPAEDIC SURGERY

## 2020-01-16 PROCEDURE — 99900035 HC TECH TIME PER 15 MIN (STAT)

## 2020-01-16 PROCEDURE — 27201423 OPTIME MED/SURG SUP & DEVICES STERILE SUPPLY: Performed by: ORTHOPAEDIC SURGERY

## 2020-01-16 PROCEDURE — 71000033 HC RECOVERY, INTIAL HOUR: Performed by: ORTHOPAEDIC SURGERY

## 2020-01-16 PROCEDURE — 36000710: Performed by: ORTHOPAEDIC SURGERY

## 2020-01-16 PROCEDURE — D9220A PRA ANESTHESIA: ICD-10-PCS | Mod: CRNA,,, | Performed by: NURSE ANESTHETIST, CERTIFIED REGISTERED

## 2020-01-16 PROCEDURE — 94761 N-INVAS EAR/PLS OXIMETRY MLT: CPT

## 2020-01-16 PROCEDURE — 71000016 HC POSTOP RECOV ADDL HR: Performed by: ORTHOPAEDIC SURGERY

## 2020-01-16 PROCEDURE — 29823 PR SHLDR ARTHROSCOP,EXTEN DEBRIDE: ICD-10-PCS | Mod: 51,RT,, | Performed by: ORTHOPAEDIC SURGERY

## 2020-01-16 DEVICE — ANCHOR TENOLOK  6.0MM: Type: IMPLANTABLE DEVICE | Site: SHOULDER | Status: FUNCTIONAL

## 2020-01-16 RX ORDER — CLINDAMYCIN PHOSPHATE 900 MG/50ML
900 INJECTION, SOLUTION INTRAVENOUS
Status: COMPLETED | OUTPATIENT
Start: 2020-01-16 | End: 2020-01-16

## 2020-01-16 RX ORDER — KETOROLAC TROMETHAMINE 30 MG/ML
INJECTION, SOLUTION INTRAMUSCULAR; INTRAVENOUS
Status: DISCONTINUED | OUTPATIENT
Start: 2020-01-16 | End: 2020-01-16 | Stop reason: HOSPADM

## 2020-01-16 RX ORDER — NEOSTIGMINE METHYLSULFATE 1 MG/ML
INJECTION, SOLUTION INTRAVENOUS
Status: DISCONTINUED | OUTPATIENT
Start: 2020-01-16 | End: 2020-01-16

## 2020-01-16 RX ORDER — ONDANSETRON 2 MG/ML
4 INJECTION INTRAMUSCULAR; INTRAVENOUS EVERY 12 HOURS PRN
Status: DISCONTINUED | OUTPATIENT
Start: 2020-01-16 | End: 2020-01-16 | Stop reason: HOSPADM

## 2020-01-16 RX ORDER — MIDAZOLAM HYDROCHLORIDE 1 MG/ML
2 INJECTION INTRAMUSCULAR; INTRAVENOUS ONCE
Status: COMPLETED | OUTPATIENT
Start: 2020-01-16 | End: 2020-01-16

## 2020-01-16 RX ORDER — LIDOCAINE HCL/PF 100 MG/5ML
SYRINGE (ML) INTRAVENOUS
Status: DISCONTINUED | OUTPATIENT
Start: 2020-01-16 | End: 2020-01-16

## 2020-01-16 RX ORDER — FAMOTIDINE 10 MG/ML
INJECTION INTRAVENOUS
Status: DISCONTINUED | OUTPATIENT
Start: 2020-01-16 | End: 2020-01-16

## 2020-01-16 RX ORDER — PREGABALIN 75 MG/1
75 CAPSULE ORAL ONCE
Status: DISCONTINUED | OUTPATIENT
Start: 2020-01-16 | End: 2020-01-16 | Stop reason: HOSPADM

## 2020-01-16 RX ORDER — FENTANYL CITRATE 50 UG/ML
INJECTION, SOLUTION INTRAMUSCULAR; INTRAVENOUS
Status: DISCONTINUED | OUTPATIENT
Start: 2020-01-16 | End: 2020-01-16

## 2020-01-16 RX ORDER — HYDROCODONE BITARTRATE AND ACETAMINOPHEN 7.5; 325 MG/1; MG/1
1 TABLET ORAL
Qty: 15 TABLET | Refills: 0 | Status: SHIPPED | OUTPATIENT
Start: 2020-01-16 | End: 2020-05-25

## 2020-01-16 RX ORDER — MIDAZOLAM HYDROCHLORIDE 1 MG/ML
INJECTION INTRAMUSCULAR; INTRAVENOUS
Status: COMPLETED
Start: 2020-01-16 | End: 2020-01-16

## 2020-01-16 RX ORDER — SCOLOPAMINE TRANSDERMAL SYSTEM 1 MG/1
PATCH, EXTENDED RELEASE TRANSDERMAL
Status: DISCONTINUED
Start: 2020-01-16 | End: 2020-01-16 | Stop reason: HOSPADM

## 2020-01-16 RX ORDER — GLYCOPYRROLATE 0.2 MG/ML
INJECTION INTRAMUSCULAR; INTRAVENOUS
Status: DISCONTINUED | OUTPATIENT
Start: 2020-01-16 | End: 2020-01-16

## 2020-01-16 RX ORDER — PREGABALIN 75 MG/1
75 CAPSULE ORAL
Status: COMPLETED | OUTPATIENT
Start: 2020-01-16 | End: 2020-01-16

## 2020-01-16 RX ORDER — ONDANSETRON 2 MG/ML
INJECTION INTRAMUSCULAR; INTRAVENOUS
Status: DISCONTINUED | OUTPATIENT
Start: 2020-01-16 | End: 2020-01-16

## 2020-01-16 RX ORDER — PROPOFOL 10 MG/ML
VIAL (ML) INTRAVENOUS
Status: DISCONTINUED | OUTPATIENT
Start: 2020-01-16 | End: 2020-01-16

## 2020-01-16 RX ORDER — ROCURONIUM BROMIDE 10 MG/ML
INJECTION, SOLUTION INTRAVENOUS
Status: DISCONTINUED | OUTPATIENT
Start: 2020-01-16 | End: 2020-01-16

## 2020-01-16 RX ORDER — PROMETHAZINE HYDROCHLORIDE 25 MG/1
25 TABLET ORAL EVERY 6 HOURS PRN
Status: DISCONTINUED | OUTPATIENT
Start: 2020-01-16 | End: 2020-01-16 | Stop reason: HOSPADM

## 2020-01-16 RX ORDER — MORPHINE SULFATE 2 MG/ML
2 INJECTION, SOLUTION INTRAMUSCULAR; INTRAVENOUS EVERY 10 MIN PRN
Status: DISCONTINUED | OUTPATIENT
Start: 2020-01-16 | End: 2020-01-16 | Stop reason: HOSPADM

## 2020-01-16 RX ORDER — KETAMINE HYDROCHLORIDE 100 MG/ML
INJECTION, SOLUTION INTRAMUSCULAR; INTRAVENOUS
Status: DISCONTINUED | OUTPATIENT
Start: 2020-01-16 | End: 2020-01-16 | Stop reason: HOSPADM

## 2020-01-16 RX ORDER — SCOLOPAMINE TRANSDERMAL SYSTEM 1 MG/1
1 PATCH, EXTENDED RELEASE TRANSDERMAL
Status: DISCONTINUED | OUTPATIENT
Start: 2020-01-16 | End: 2020-01-16 | Stop reason: HOSPADM

## 2020-01-16 RX ORDER — DEXAMETHASONE SODIUM PHOSPHATE 4 MG/ML
INJECTION, SOLUTION INTRA-ARTICULAR; INTRALESIONAL; INTRAMUSCULAR; INTRAVENOUS; SOFT TISSUE
Status: DISCONTINUED | OUTPATIENT
Start: 2020-01-16 | End: 2020-01-16

## 2020-01-16 RX ORDER — ROPIVACAINE HYDROCHLORIDE 5 MG/ML
INJECTION, SOLUTION EPIDURAL; INFILTRATION; PERINEURAL
Status: DISCONTINUED | OUTPATIENT
Start: 2020-01-16 | End: 2020-01-16 | Stop reason: HOSPADM

## 2020-01-16 RX ORDER — MIDAZOLAM HYDROCHLORIDE 1 MG/ML
INJECTION, SOLUTION INTRAMUSCULAR; INTRAVENOUS
Status: DISCONTINUED | OUTPATIENT
Start: 2020-01-16 | End: 2020-01-16

## 2020-01-16 RX ORDER — PREGABALIN 75 MG/1
CAPSULE ORAL
Status: COMPLETED
Start: 2020-01-16 | End: 2020-01-16

## 2020-01-16 RX ORDER — KETAMINE HYDROCHLORIDE 10 MG/ML
INJECTION, SOLUTION INTRAMUSCULAR; INTRAVENOUS
Status: DISCONTINUED | OUTPATIENT
Start: 2020-01-16 | End: 2020-01-16

## 2020-01-16 RX ORDER — SODIUM CHLORIDE 9 MG/ML
INJECTION, SOLUTION INTRAVENOUS CONTINUOUS
Status: DISCONTINUED | OUTPATIENT
Start: 2020-01-16 | End: 2020-01-16 | Stop reason: HOSPADM

## 2020-01-16 RX ORDER — EPINEPHRINE 1 MG/ML
INJECTION, SOLUTION INTRACARDIAC; INTRAMUSCULAR; INTRAVENOUS; SUBCUTANEOUS
Status: DISCONTINUED | OUTPATIENT
Start: 2020-01-16 | End: 2020-01-16 | Stop reason: HOSPADM

## 2020-01-16 RX ORDER — FENTANYL CITRATE 50 UG/ML
25 INJECTION, SOLUTION INTRAMUSCULAR; INTRAVENOUS EVERY 5 MIN PRN
Status: DISCONTINUED | OUTPATIENT
Start: 2020-01-16 | End: 2020-01-16 | Stop reason: HOSPADM

## 2020-01-16 RX ORDER — OXYCODONE HYDROCHLORIDE 5 MG/1
10 TABLET ORAL EVERY 4 HOURS PRN
Status: DISCONTINUED | OUTPATIENT
Start: 2020-01-16 | End: 2020-01-16 | Stop reason: HOSPADM

## 2020-01-16 RX ORDER — TRAMADOL HYDROCHLORIDE 50 MG/1
100 TABLET ORAL ONCE
Status: DISCONTINUED | OUTPATIENT
Start: 2020-01-16 | End: 2020-01-16 | Stop reason: HOSPADM

## 2020-01-16 RX ORDER — OXYCODONE HYDROCHLORIDE 5 MG/1
5 TABLET ORAL
Status: DISCONTINUED | OUTPATIENT
Start: 2020-01-16 | End: 2020-01-16 | Stop reason: HOSPADM

## 2020-01-16 RX ADMIN — KETAMINE HYDROCHLORIDE 30 MG: 10 INJECTION INTRAMUSCULAR; INTRAVENOUS at 07:01

## 2020-01-16 RX ADMIN — SODIUM CHLORIDE, SODIUM GLUCONATE, SODIUM ACETATE, POTASSIUM CHLORIDE, MAGNESIUM CHLORIDE, SODIUM PHOSPHATE, DIBASIC, AND POTASSIUM PHOSPHATE: .53; .5; .37; .037; .03; .012; .00082 INJECTION, SOLUTION INTRAVENOUS at 07:01

## 2020-01-16 RX ADMIN — CLINDAMYCIN PHOSPHATE 900 MG: 18 INJECTION, SOLUTION INTRAVENOUS at 07:01

## 2020-01-16 RX ADMIN — LIDOCAINE HYDROCHLORIDE 100 MG: 20 INJECTION, SOLUTION INTRAVENOUS at 07:01

## 2020-01-16 RX ADMIN — FENTANYL CITRATE 100 MCG: 50 INJECTION, SOLUTION INTRAMUSCULAR; INTRAVENOUS at 07:01

## 2020-01-16 RX ADMIN — ROCURONIUM BROMIDE 30 MG: 10 INJECTION, SOLUTION INTRAVENOUS at 07:01

## 2020-01-16 RX ADMIN — PREGABALIN 75 MG: 75 CAPSULE ORAL at 05:01

## 2020-01-16 RX ADMIN — DEXAMETHASONE SODIUM PHOSPHATE 8 MG: 4 INJECTION, SOLUTION INTRAMUSCULAR; INTRAVENOUS at 07:01

## 2020-01-16 RX ADMIN — OXYCODONE HYDROCHLORIDE 10 MG: 5 TABLET ORAL at 09:01

## 2020-01-16 RX ADMIN — FENTANYL CITRATE 25 MCG: 50 INJECTION INTRAMUSCULAR; INTRAVENOUS at 09:01

## 2020-01-16 RX ADMIN — PROPOFOL 140 MG: 10 INJECTION, EMULSION INTRAVENOUS at 07:01

## 2020-01-16 RX ADMIN — FAMOTIDINE 20 MG: 10 INJECTION, SOLUTION INTRAVENOUS at 07:01

## 2020-01-16 RX ADMIN — NEOSTIGMINE METHYLSULFATE 2.5 MG: 1 INJECTION INTRAVENOUS at 08:01

## 2020-01-16 RX ADMIN — SODIUM CHLORIDE: 0.9 INJECTION, SOLUTION INTRAVENOUS at 05:01

## 2020-01-16 RX ADMIN — PROMETHAZINE HYDROCHLORIDE 25 MG: 25 TABLET ORAL at 10:01

## 2020-01-16 RX ADMIN — GLYCOPYRROLATE 0.4 MG: 0.2 INJECTION, SOLUTION INTRAMUSCULAR; INTRAVENOUS at 08:01

## 2020-01-16 RX ADMIN — MIDAZOLAM 1 MG: 1 INJECTION INTRAMUSCULAR; INTRAVENOUS at 06:01

## 2020-01-16 RX ADMIN — SCOPALAMINE 1 PATCH: 1 PATCH, EXTENDED RELEASE TRANSDERMAL at 06:01

## 2020-01-16 RX ADMIN — MIDAZOLAM HYDROCHLORIDE 2 MG: 1 INJECTION, SOLUTION INTRAMUSCULAR; INTRAVENOUS at 06:01

## 2020-01-16 RX ADMIN — ONDANSETRON 4 MG: 2 INJECTION INTRAMUSCULAR; INTRAVENOUS at 08:01

## 2020-01-16 NOTE — DISCHARGE INSTRUCTIONS
Recovery After Procedural Sedation (Adult)  You have been given medicine by vein to make you sleep during your surgery. This may have included both a pain medicine and sleeping medicine. Most of the effects have worn off. But you may still have some drowsiness for the next 6 to 8 hours.    Home care  Follow these guidelines when you get home:  · For the next 8 hours, you should be watched by a responsible adult. This person should make sure your condition is not getting worse.  · Don't drink any alcohol for the next 24 hours.  · Don't drive, operate dangerous machinery, or make important business or personal decisions during the next 24 hours.  Note: Your healthcare provider may tell you not to take any medicine by mouth for pain or sleep in the next 4 hours. These medicines may react with the medicines you were given in the hospital. This could cause a much stronger response than usual.    Follow-up care  Follow up with your healthcare provider if you are not alert and back to your usual level of activity within 12 hours.    When to seek medical advice  Call your healthcare provider right away if any of these occur:  · Drowsiness gets worse  · Weakness or dizziness gets worse  · Repeated vomiting  · You can't be awakened     Date Last Reviewed: 10/18/2016  © 7421-3129 [employer]. [address] [place] [address], [place] [address]. All rights reserved. This information is not intended as a substitute for professional medical care. Always follow your healthcare professional's instructions.    Discharge Instructions: After Your Surgery  You've just had surgery. During surgery, you were given medicine called anesthesia to keep you relaxed and free of pain. After surgery, you may have some pain or nausea. This is common. Here are some tips for feeling better and getting well after surgery.    Stay on schedule with your medicine.   Going home  Your healthcare provider will show you how to take care of yourself when  you go home. He or she will also answer your questions. Have an adult family member or friend drive you home. For the first 24 hours after your surgery:  · Have someone stay with you, if needed. He or she can watch for problems and help keep you safe.  Be sure to go to all follow-up visits with your healthcare provider. And rest after your surgery for as long as your healthcare provider tells you to.    Coping with pain  If you have pain after surgery, pain medicine will help you feel better. Take it as told, before pain becomes severe. Also, ask your healthcare provider or pharmacist about other ways to control pain. This might be with heat, ice, or relaxation. And follow any other instructions your surgeon or nurse gives you.    Tips for taking pain medicine  To get the best relief possible, remember these points:  · Pain medicines can upset your stomach. Taking them with a little food may help.  · Most pain relievers taken by mouth need at least 20 to 30 minutes to start to work.  · Taking medicine on a schedule can help you remember to take it. Try to time your medicine so that you can take it before starting an activity. This might be before you get dressed, go for a walk, or sit down for dinner.  · Constipation is a common side effect of pain medicines. Call your healthcare provider before taking any medicines such as laxatives or stool softeners to help ease constipation. Also ask if you should skip any foods. Drinking lots of fluids and eating foods such as fruits and vegetables that are high in fiber can also help. Remember, do not take laxatives unless your surgeon has prescribed them.    Your healthcare provider may tell you to take acetaminophen to help ease your pain. Ask him or her how much you are supposed to take each day. Acetaminophen or other pain relievers may interact with your prescription medicines or other over-the-counter (OTC) medicines. Some prescription medicines have acetaminophen and  other ingredients. Using both prescription and OTC acetaminophen for pain can cause you to overdose. Read the labels on your OTC medicines with care. This will help you to clearly know the list of ingredients, how much to take, and any warnings. It may also help you not take too much acetaminophen. If you have questions or do not understand the information, ask your pharmacist or healthcare provider to explain it to you before you take the OTC medicine.    Managing nausea  Some people have an upset stomach after surgery. This is often because of anesthesia, pain, or pain medicine, or the stress of surgery. These tips will help you handle nausea and eat healthy foods as you get better. If you were on a special food plan before surgery, ask your healthcare provider if you should follow it while you get better. These tips may help:  · Do not push yourself to eat. Your body will tell you when to eat and how much.  · Start off with clear liquids and soup. They are easier to digest.  · Next try semi-solid foods, such as mashed potatoes, applesauce, and gelatin, as you feel ready.  · Slowly move to solid foods. Don't eat fatty, rich, or spicy foods at first.  · Do not force yourself to have 3 large meals a day. Instead eat smaller amounts more often.  · Take pain medicines with a small amount of solid food, such as crackers or toast, to avoid nausea.     Call your surgeon if  · You still have pain an hour after taking medicine. The medicine may not be strong enough.  · You feel too sleepy, dizzy, or groggy. The medicine may be too strong.  · You have side effects like nausea, vomiting, or skin changes, such as rash, itching, or hives.       If you have obstructive sleep apnea  You were given anesthesia medicine during surgery to keep you comfortable and free of pain. After surgery, you may have more apnea spells because of this medicine and other medicines you were given. The spells may last longer than usual.   At  home:  · Keep using the continuous positive airway pressure (CPAP) device when you sleep. Unless your healthcare provider tells you not to, use it when you sleep, day or night. CPAP is a common device used to treat obstructive sleep apnea.  · Talk with your provider before taking any pain medicine, muscle relaxants, or sedatives. Your provider will tell you about the possible dangers of taking these medicines.  Date Last Reviewed: 12/1/2016  © 5585-4094 [employer]. [address] [place] [id number]. All rights reserved. This information is not intended as a substitute for professional medical care. Always follow your healthcare professional's instructions.          PATIENT INSTRUCTIONS  POST-ANESTHESIA    IMMEDIATELY FOLLOWING SURGERY:  Do not drive or operate machinery for the first twenty four hours after surgery.  Do not make any important decisions for twenty four hours after surgery or while taking narcotic pain medications or sedatives.  If you develop intractable nausea and vomiting or a severe headache please notify your doctor immediately.    FOLLOW-UP:  Please make an appointment with your surgeon as instructed. You do not need to follow up with anesthesia unless specifically instructed to do so.    WOUND CARE INSTRUCTIONS (if applicable):  Keep a dry clean dressing on the anesthesia/puncture wound site if there is drainage.  Once the wound has quit draining you may leave it open to air.  Generally you should leave the bandage intact for twenty four hours unless there is drainage.  If the epidural site drains for more than 36-48 hours please call the anesthesia department.    QUESTIONS?:  Please feel free to call your physician or the hospital  if you have any questions, and they will be happy to assist you.       [facility] Anesthesia Department  [address] [facility]  [phone]      Wound Check After Surgery: Bleeding  Surgery involves cutting through  layers of skin, fatty tissue, muscle, and sometimes bone and cartilage. Sutures (stitches) or staples are used to close all layers of the wound. The sutures on the inside will dissolve in about 2 to 3 weeks. Any sutures or staples used on the outside need to be removed in about 7 to 14 days, depending on the location.  It is normal to have some clear or bloody discharge on the wound covering or bandage (dressing) for the first few days after surgery. If your wound was sutured (sewn) closed, you should not have to change the dressing more than three times a day in the first few days. Bleeding or discharge requiring more frequent dressing changes can be a sign of a problem.    Home care  Different types of surgery require different types of care and dressing changes. It is important to follow all instructions and advice from your surgeon, as well as other members of your healthcare team.    Wound care  · Keep the wound clean, as directed by your healthcare provider.  · Change the dressing as directed. Change the dressing sooner if it becomes wet or stained with blood or fluid from the wound.  · Bathe with a sponge (no shower or tub baths) for the first few days after surgery, or until there is no more drainage from the wound. Unless you received different instructions from your surgeon, you can then shower. Do not soak the area in water (no baths or swimming) until the tape, sutures, or staples are removed and any wound opening has dried out and healed.    Changing the dressing  · Wash your hands before changing the dressings.  · Carefully remove the dressing and tape; don't just yank it off. If it sticks to the wound, you may need to wet it a little to remove it, unless your healthcare provider told you not to wet it.  · Wash your hands again before putting on a new, clean dressing.  · Gently clean the wound with clean water (or saline) using gauze or a clean washcloth. Do not rub it or pick at it.  · Do not use  soap, alcohol, hydrogen peroxide, or any other cleanser.  · If you were told to dry the wound before putting on a new dressing, gently pat it dry. Do not rub.  · Throw out the old dressing. Do not reuse it!  · Wash your hands again when you are done.    Types of dressings  Your healthcare team will tell you what type of dressing to put on your wound. Follow your healthcare team's instructions carefully, and contact them if you have any questions. Two common types of dressings are described below. You may have one of these or another type.  · Dry dressing. Use dry gauze. If the wound is still draining, use a nonadherent dressing, which shouldn't stick to the wound.  · Wet-to-dry dressing. Wet the gauze, and squeeze out the excess water (or saline), before putting it on. Then, cover this with a dry pad.    Medicines  · If you were given antibiotics, take them until they are used up or your healthcare provider tells you to stop. It is important to finish the antibiotics even though you feel better, to make sure the infection has cleared.  · You can take acetaminophen or ibuprofen for pain, unless you were given a different pain medicine to use. (Note: If you have chronic liver or kidney disease, or have ever had a stomach ulcer or gastrointestinal bleeding, or are taking blood thinner medicines, talk with your healthcare provider before using these medicines.)  · Aspirin should never be used in anyone under 18 years of age who is ill with a fever. It may cause severe liver damage.    Follow-up care  Follow up with your healthcare provider, or as advised, for your next wound check or removal of sutures, staples, or tape.  · If a culture was done, you will be notified if the results will affect your treatment. You can call as directed for the results.  · If imaging tests, such as X-rays, an ultrasound, or CT scan were done, they will be reviewed by a specialist. You will be notified of the results, especially if they  affect treatment.    Call 911  Call emergency services right away if any of these occur:  · Trouble breathing or swallowing, wheezing  · Hoarse voice or trouble speaking  · Extreme confusion  · Extreme drowsiness or trouble awakening  · Fainting or loss of consciousness  · Rapid heart rate or very slow heart rate  · Vomiting blood, or large amounts of blood in stool  · Discomfort in the center of the chest that feels like pressure, squeezing, a sense of fullness, or pain  · Discomfort or pain in other upper body areas, such as the back, one or both arms, neck, jaw, or stomach  · Stroke symptoms (spot a stroke FAST)  ¨ F: Face drooping. One side of the face is numb or droops.  ¨ A: Arm weakness. One arm feels weak or numb.  ¨ S: Speech difficulty: Speech is slurred, or the person is unable to speak.  ¨ T: Time to call 911. Even if symptoms go away, call 911.    When to seek medical advice  Call your healthcare provider right away if any of the following occur:  · Fluid or blood soaking 5 or more bandages a day during the first 3 days after surgery  · Fluid or blood still draining from the wound more than 3 days after surgery  · Increasing pain at the site of surgery  · Fever over 100.4º F (38.0º C)  · Redness around the wound  · Pus coming from the wound  · Vomiting, constipation, or diarrhea    Date Last Reviewed: 9/27/2015  © 9264-0408 [employer]. [address] [place] [address]. All rights reserved. This information is not intended as a substitute for professional medical care. Always follow your healthcare professional's instructions.      Wound Check After Surgery, No Complication    It is normal to feel pain at the incision site. The pain decreases as the wound heals. Most of the pain and soreness from the skin incision should go away by the time the sutures or staples are removed. Soreness and pain from deeper tissues may last another week or two.  Pain that continues more than a few  weeks after surgery or pain that worsens anytime after surgery can be a sign of a problem, such as:  · Infection  · Separation of wound edges  · Collection of blood or other below the skin        Date Last Reviewed: 9/27/2015 © 2000-2017 [employer]. [address] [place] [id number]. All rights reserved. This information is not intended as a substitute for professional medical care. Always follow your healthcare professional's instructions.[employer] CARE CUBE COLD THERAPY SYSTEM    The [employer] Care Cube Cold Therapy System is simple and reliable. It is easy to use, compact design makes it great for home use. With the addition of ice and water, you will enjoy 6-8 hours of effortless cold therapy. Proper use requires an insulation barrier between the pad and the patient's skin.  Instructions on how to use the [employer] Care Cube Cold Therapy System Below:    Patient instructions given per surgical and medical guidelines.  NPO status reviewed with patient.  Patient verbalized understanding of both food (8 hours)and fluid intake (2hours)prior to surgery.  Reinforced ONLY CLEAR liquids to include water, apple juice, clear gatorade and such.  (No milk). Antibacterial scrub instructions given, per MD recommendations.  All questions answered.  Driving directions given for day of surgery. Voiced understanding that surgery will take place at the [facility] on [place].  Anticoagulants, OTC's and vitamins, and prescription medication instructions reviewed per protocol.  Nurse reviewed history as stated in chart.  Message sent to patient portal if available.

## 2020-01-16 NOTE — TRANSFER OF CARE
"Anesthesia Transfer of Care Note    Patient: Adrienne Andrew    Procedure(s) Performed: Procedure(s) (LRB):  FIXATION, TENDON, subpectoral bicep tenodesis (Right)  ARTHROSCOPY, SHOULDER, WITH SUBACROMIAL SPACE DECOMPRESSION EXTENSIVE DEBRIDMENT (Right)  PARTIAL THICKNESS CUFF DEBRIDEMENT, SHOULDER, ARTHROSCOPIC (Right)    Patient location: PACU    Anesthesia Type: general    Transport from OR: Transported from OR on 6-10 L/min O2 by face mask with adequate spontaneous ventilation    Post pain: adequate analgesia    Post assessment: no apparent anesthetic complications and tolerated procedure well    Post vital signs: stable    Level of consciousness: sedated and responds to stimulation    Nausea/Vomiting: no nausea/vomiting    Complications: none    Transfer of care protocol was followed      Last vitals:   Visit Vitals  /60   Pulse 100   Temp 36.4 °C (97.5 °F)   Resp 14   Ht 5' 1" (1.549 m)   Wt 56.7 kg (125 lb)   LMP 12/22/2019   SpO2 100%   Breastfeeding? No   BMI 23.62 kg/m²     "

## 2020-01-16 NOTE — ANESTHESIA PREPROCEDURE EVALUATION
01/16/2020  Adrienne Andrew is a 44 y.o., female.    Anesthesia Evaluation    I have reviewed the Patient Summary Reports.    I have reviewed the Nursing Notes.   I have reviewed the Medications.     Review of Systems  Social:  Non-Smoker, No Alcohol Use    Hematology/Oncology:  Hematology Normal   Oncology Normal     EENT/Dental:EENT/Dental Normal   Cardiovascular:  Cardiovascular Normal   Denies Angina.  Functional Capacity good / => 4 METS    Pulmonary:  Pulmonary Normal  Denies Shortness of breath.  Denies Recent URI.    Renal/:  Renal/ Normal     Hepatic/GI:  Hepatic/GI Normal    Musculoskeletal:  Musculoskeletal Normal    Neurological:  Neurology Normal    Endocrine:  Endocrine Normal    Dermatological:  Skin Normal    Psych:  Psychiatric Normal         Patient Active Problem List   Diagnosis    Myopia of both eyes    Chronic right shoulder pain    Abnormal pelvic ultrasound    Biceps tendinitis of right upper extremity         Physical Exam  General:  Well nourished    Airway/Jaw/Neck:  Airway Findings: Mouth Opening: Normal Tongue: Normal  General Airway Assessment: Average  Mallampati: III  Improves to II with phonation.  TM Distance: Normal, at least 6 cm  Jaw/Neck Findings:  Neck ROM: Normal ROM            Mental Status:  Mental Status Findings:  Alert and Oriented         Anesthesia Plan  Type of Anesthesia, risks & benefits discussed:  Anesthesia Type:  general, MAC, regional  Patient's Preference:   Intra-op Monitoring Plan: standard ASA monitors  Intra-op Monitoring Plan Comments:   Post Op Pain Control Plan:   Post Op Pain Control Plan Comments:   Induction:   IV  Beta Blocker:  Patient is not currently on a Beta-Blocker (No further documentation required).       Informed Consent: Patient understands risks and agrees with Anesthesia plan.  Questions answered. Anesthesia  consent signed with patient.  ASA Score: 2     Day of Surgery Review of History & Physical:    H&P update referred to the surgeon.         Ready For Surgery From Anesthesia Perspective.

## 2020-01-16 NOTE — DISCHARGE SUMMARY
[facility]  Brief Operative Note    Surgery Date: 1/16/2020     Surgeon(s) and Role:     * Charmaine Barr MD - Primary    Assisting Surgeon: Aldair Ramos MD PGY5    Pre-op Diagnosis:  Biceps tendinitis of right upper extremity [M75.21]  Subacromial impingement of right shoulder [M75.41]  Rotator cuff syndrome of right shoulder [M75.101]  Superior glenoid labrum lesion of right shoulder, initial encounter [S43.431A]    Post-op Diagnosis:  Post-Op Diagnosis Codes:     * Biceps tendinitis of right upper extremity [M75.21]     * Subacromial impingement of right shoulder [M75.41]     * Rotator cuff syndrome of right shoulder [M75.101]     * Superior glenoid labrum lesion of right shoulder, initial encounter [S43.431A]    Procedure(s) (LRB):  FIXATION, TENDON, bicep tenodesis (Right)  ARTHROSCOPY, SHOULDER, WITH SUBACROMIAL SPACE DECOMPRESSION (Right)  DEBRIDEMENT, SHOULDER, ARTHROSCOPIC (Right)    Anesthesia: General    Description of the findings of the procedure(s): right shoulder arthroscopy    Estimated Blood Loss: minimal         Specimens:   Specimen (12h ago, onward)    None            Discharge Note    OUTCOME: Patient tolerated treatment/procedure well without complication and is now ready for discharge.    DISPOSITION: Home or Self Care    FINAL DIAGNOSIS: right shoulder biceps tendinitis     FOLLOWUP: In clinic

## 2020-01-16 NOTE — INTERVAL H&P NOTE
The patient has been examined and the H&P has been reviewed:    I concur with the findings and no changes have occurred since H&P was written.    Anesthesia/Surgery risks, benefits and alternative options discussed and understood by patient/family.          Active Hospital Problems    Diagnosis  POA    Biceps tendinitis of right upper extremity [M75.21]  Yes      Resolved Hospital Problems   No resolved problems to display.

## 2020-01-16 NOTE — OP NOTE
DATE OF PROCEDURE: 01/16/2020    SURGEON: Charmaine Barr M.D.    ASSISTANT: GERARDO Ramos M.D. PGY5  ASSISTANT: SMA Hussein      PREOPERATIVE DIAGNOSES:   right  1. Shoulder biceps tendonitis  2. Shoulder partial thickness cuff tear  3. Shoulder synovitis  4. Shoulder SLAP  5. Shoulder impingement, bursitis    POSTOPERATIVE DIAGNOSES:   right  1. Shoulder biceps tendonitis  2. Shoulder partial thickness cuff tear  3. Shoulder synovitis  4. Shoulder SLAP  5. Shoulder impingement, bursitis    OPERATION:   right  1. Shoulder open subpectoral biceps tenodesis (CPT 05171)  2. Shoulder arthroscopic partial thickness cuff debridement  3. Shoulder subacromial decompression  4. Shoulder arthroscopic extensive debridement (anterior, posterior glenohumeral joint, subacromial space) (CPT 14921)    ANESTHESIA:  General with intra-op suprascapular nerve block with local    BLOOD LOSS: Minimal.     DRAINS: None.     TOURNIQUET TIME: None.     COMPLICATIONS: None. The patient was moved to the recovery room in stable condition with compartments soft and cap refill less than a second in all digits.     BRIEF INDICATION OF MEDICAL NECESSITY: The patient is a 44 y.o. year-old female who has history and physical examination findings consistent with the above. Nonoperative versus operative options were discussed. The risks and benefits were discussed with the patient. The patient acknowledged understanding and wished to proceed with operative intervention. Informed consent was obtained prior to the procedure. Reasonable expectations and potential complications were discussed and acknowledged, including but not limited to infection, bleeding, blood clots, (DVT and/or PE), nerve injury, re-tear, instability, continued pain and stiffness. They agreed and   understood and wished to proceed.     EXAMINATION UNDER ANESTHESIA OF THE right SHOULDER: Forward elevation 175 degrees, External rotation at 0-60 degrees, External rotation at 0-90  degrees, Internal rotation at 90-60 degrees. Translation testing: anterior grade 1, posterior grade 1, sulcus sign grade 1 corrects to 0 on external rotation.     EXAMINATION UNDER ANESTHESIA OF THE NONOPERATED left SHOULDER: Forward elevation 175 degrees, External rotation at 0-60 degrees, External rotation at 0-90 degrees, Internal rotation at 90-60 degrees. Translation testing: anterior grade 1, posterior grade 1, sulcus sign grade 1 corrects to 0 on external rotation.     PROCEDURE IN DETAIL: After the correct operative site was marked by the operating surgeon, an interscalene block was administered by the anesthesia team. The patient was then taken to the operating room and placed supine on the operating room table, where the patient underwent general anesthesia by the anesthesia team.  Manipulation was performed gently with a palpable release of adhesions.  ROM post-manipulation as listed above.  The patient was then rolled into the lateral decubitus position with the operative side up. A well-padded axillary roll, beanbag and pillows were placed. All pressure points were carefully padded and checked. The upper extremities and both lower extremities were placed in comfortable positions and were also well-padded. The left upper extremity was then prepped and draped in the usual sterile fashion.     The Spider arm positioner was implemented with balanced suspension and appropriate landmarks were noted on the skin. A posterior followed by ravinder-superior portals were created and systematic examination of the joint revealed the following:     Suprascapular nerve block was performed in the standard fashion with 5cc local anesthetic.    There was no evidence of any significant chondral lesions to the glenoid or humeral head.     + SLAP type 2 with tenosynovitis was noted.    Partial thickness 20% cuff tearing on articular side was debrided with shaver and gently with thermal device.    There was some synovitis in  the labrum that was debrided as needed, both anteriorly and posteriorly in the glenohumeral joint .     Biceps auto-tenodesis was performed using thermal device.  Part of superior labrum was included to allow the biceps tendon to auto-tenodese.  Attention was then turned to the rotator cuff.  The rotator cuff undersurface was then visualized and debrided as needed using a shaver.      Attention was then turned to the subacromial space where a significant hypertrophic bursa was encountered.  The bursa itself was thickened and hypertrophic.  A lateral portal was created to assist with the bursectomy.  Bursal side of the rotator cuff was intact.  Subacromial decompression was performed with arthroscopic sapphire.    Debridement was performed with the shaver and thermal device in the anterior glenohumeral joint, posterior glenohumeral joint and subacromial space.    Attention was then turned to the open subpectoral biceps tenodesis.  A 2.5 cm longitudinal incision was made at the junction of pectoralis tendon extending distally.  Blunt dissection proceeded and the biceps tendon was localized.  The biceps tendon was localized next to the humerus.  Care was taken and gentle retraction was utilized.      Guidepin was placed and a 8 mm reamer was placed to ream the anterior cortex anteriorly.  Conmed biceps tenodesis, 6.0 x 19 mm TenoLok PEEK anchor, was placed with the captured biceps tendon.  It had good purchase and was inserted flush with the humeral cortex.  Fixation was solid and tension was appropriate.  The proximal 2 cm of biceps tendon was removed at the appropriate tension level. The incision site was then irrigated copiously and gently and the skin was closed using 3-0 Vicryl subcutaneous suture in an interrupted fashion.  The skin was closed using a running 4-0 Monocryl suture, reapproximating the skin edges nicely.     Suprascapular nerve block was performed in the standard fashion with 5cc local anesthetic, and  5cc subacromially for local.  Local was placed about portals and incision(s) after irrigation, as described below, was completed. The shoulder and subacromial space were then irrigated and fluid was extravasated using suction. All portals were reapproximated using inverted 4-0 Monocryl suture in the subcutaneous tissue of the portals. Mastisol and Steri-strips were placed with xeroform, 4x4s, abd pad, and Medi-pore tape.  TENS unit pads were placed which were medically necessary for pain relief.  An iceman was secured in the shoulder maria.  A sling with an abduction pillow was secured.  The patient was then moved to supine, extubated and taken to the recovery room where the patient arrived in stable condition with the compartments of the arm and forearm soft and cap refill less than a second in all digits    POST OPERATIVE PLAN: We will follow the arthroscopic biceps tenodesis guidelines. We discussed with the patient's family after surgery. The patient will remain in a sling for 3-4 weeks.  Wean sling x 3-4 weeks, no ROM restrictions.  No lifting > 1 pound x 6 weeks.

## 2020-01-17 ENCOUNTER — TELEPHONE (OUTPATIENT)
Dept: SPORTS MEDICINE | Facility: CLINIC | Age: 45
End: 2020-01-17

## 2020-01-17 VITALS
RESPIRATION RATE: 20 BRPM | TEMPERATURE: 99 F | HEIGHT: 61 IN | DIASTOLIC BLOOD PRESSURE: 70 MMHG | HEART RATE: 86 BPM | OXYGEN SATURATION: 97 % | SYSTOLIC BLOOD PRESSURE: 119 MMHG | BODY MASS INDEX: 23.6 KG/M2 | WEIGHT: 125 LBS

## 2020-01-20 NOTE — ANESTHESIA POSTPROCEDURE EVALUATION
Anesthesia Post Evaluation    Patient: Adrienne Santo Crittendon    Procedure(s) Performed: Procedure(s) (LRB):  FIXATION, TENDON, subpectoral bicep tenodesis (Right)  ARTHROSCOPY, SHOULDER, WITH SUBACROMIAL SPACE DECOMPRESSION EXTENSIVE DEBRIDMENT (Right)  PARTIAL THICKNESS CUFF DEBRIDEMENT, SHOULDER, ARTHROSCOPIC (Right)    Final Anesthesia Type: general    Patient location during evaluation: PACU  Patient participation: Yes- Able to Participate  Level of consciousness: awake and alert and oriented  Post-procedure vital signs: reviewed and stable  Pain management: adequate  Airway patency: patent    PONV status at discharge: No PONV  Anesthetic complications: no      Cardiovascular status: stable  Respiratory status: unassisted  Hydration status: euvolemic  Follow-up not needed.              Event Time     Out of Recovery 09:46:26          Pain/Hema Score: No data recorded

## 2020-01-20 NOTE — TELEPHONE ENCOUNTER
Post-op pain medication will be called in to her external pharmacy.    [Feeling Poorly] : not feeling poorly (malaise) [Fever] : no fever [Pallor] : not pale [Wgt Loss (___ Lbs)] : no recent weight loss [Eye Discharge] : no eye discharge [Redness] : no redness [Change in Vision] : no change in vision [Sore Throat] : no sore throat [Nasal Stuffiness] : no nasal congestion [Earache] : no earache [Loss Of Hearing] : no hearing loss [Cyanosis] : no cyanosis [Edema] : no edema [Diaphoresis] : not diaphoretic [Exercise Intolerance] : no persistence of exercise intolerance [Palpitations] : no palpitations [Orthopnea] : no orthopnea [Fast HR] : no tachycardia [Tachypnea] : not tachypneic [Wheezing] : no wheezing [Cough] : no cough [Shortness Of Breath] : not expressed as feeling short of breath [Vomiting] : no vomiting [Diarrhea] : no diarrhea [Abdominal Pain] : no abdominal pain [Decrease In Appetite] : appetite not decreased [Fainting (Syncope)] : no fainting [Seizure] : no seizures [Dizziness] : no dizziness [Headache] : no headache [Limping] : no limping [Joint Pains] : no arthralgias [Joint Swelling] : no joint swelling [Rash] : no rash [Wound problems] : no wound problems [Easy Bruising] : no tendency for easy bruising [Swollen Glands] : no lymphadenopathy [Easy Bleeding] : no ~M tendency for easy bleeding [Nosebleeds] : no epistaxis [Sleep Disturbances] : ~T no sleep disturbances [Hyperactive] : no hyperactive behavior [Depression] : no depression [Anxiety] : no anxiety [Failure To Thrive] : no failure to thrive [Short Stature] : short stature was not noted [Jitteriness] : no jitteriness [Heat/Cold Intolerance] : no temperature intolerance [Dec Urine Output] : no oliguria [FreeTextEntry1] : occasional chest tightness

## 2020-01-21 ENCOUNTER — CLINICAL SUPPORT (OUTPATIENT)
Dept: REHABILITATION | Facility: HOSPITAL | Age: 45
End: 2020-01-21
Payer: COMMERCIAL

## 2020-01-21 DIAGNOSIS — M25.511 RIGHT SHOULDER PAIN, UNSPECIFIED CHRONICITY: ICD-10-CM

## 2020-01-21 PROCEDURE — 97161 PT EVAL LOW COMPLEX 20 MIN: CPT

## 2020-01-21 PROCEDURE — 97110 THERAPEUTIC EXERCISES: CPT

## 2020-01-22 NOTE — PLAN OF CARE
[facility]  Physical Therapy Initial Evaluation    Name: Adrienne Andrew  Clinic Number: [id number]    Therapy Diagnosis:   Encounter Diagnosis   Name Primary?    Right shoulder pain, unspecified chronicity      Physician: Ned Caraballo III, *    Physician Orders: PT Eval and Treat  Medical Diagnosis:   M75.21 (ICD-10-CM) - Biceps tendinitis of right upper extremity   M75.41 (ICD-10-CM) - Subacromial impingement of right shoulder   M75.101 (ICD-10-CM) - Rotator cuff syndrome of right shoulder     Evaluation Date: 1/21/2020  Authorization Period Expiration: 1/12/2021  Plan of Care Certification Period: 7/7/2020  Visit # / Visits authorized: 1/1  Date of Surgery: 1/16/2020  Precautions: Standard    Time In: 910a  Time Out: 1010a  Total Billable Time: 60 minutes    Subjective     Date of onset: 1/16/2020  History of current condition - Adrienne reports: that she is still in quite a bit of pain since surgery.  She states history of shoulder pain with lifting and Crossfit that did not get any better with PT.  She states that she is now uncomfortable in sling.  She states no pain meds.  She states that she is having the most pain at lateral shoulder and upper trap.  She states that she is being very safe with arm in sling and would like to return to lifting weights when appropriate.     Past Medical History:   Diagnosis Date    Anemia      Adrienne Andrew  has a past surgical history that includes Boissevain tooth extraction; Dilation and curettage of uterus; Fixation of tendon (Right, 1/16/2020); Arthroscopy of shoulder with decompression of subacromial space (Right, 1/16/2020); and Arthroscopic debridement of shoulder (Right, 1/16/2020).    Adrienne has a current medication list which includes the following prescription(s): aspirin, brimonidine, ferrous sulfate, hydrocodone-acetaminophen, promethazine, and tramadol.    Review of patient's allergies indicates:  No Known Allergies  "    Prior Therapy: Yes  Social History: Lives at home with family  Occupation:   Prior Level of Function: No limitation  Current Level of Function: Limited with AROM of affected arm    Pain:  Current 4/10, worst 8/10, best 2/10   Location: right shoulder   Description: Aching, Dull, Tight and Deep    Pts goals: To resume all previous activity, lifting, woodwork    Objective     Range of Motion:   Shoulder Right (AROM/PROM) Left (AROM/PROM)   Flexion 25 PROM degrees 175 degrees   Abduction NT degrees 175 degrees   ER at 90 NT degrees 90 degrees   IR at 90 Nt degrees 88 degrees     Strength:  Shoulder Right Left   Flexion NT/5 5/5   Abduction NT/5 5/5   ER NT/5 5/5   IR NT/5 5/5     TREATMENT     Treatment Time In: 940a  Treatment Time Out: 1010a  Total Treatment time separate from Evaluation time:30    Adrienne received therapeutic exercises to develop strength, endurance, ROM, posture and core stabilization for 30 minutes including:  PROM affected shoulder  Scap squeeze - 20x5"  Shoulder rolls - education  Elbow flex/ext - 20x  Adrienne received the following manual therapy techniques for 0 minutes including:      Home Exercises and Patient Education Provided    Education provided re: therapeutic diagnosis and plan of care    Written Home Exercises Provided: Yes  Exercises were reviewed and Adrienne was able to demonstrate them prior to the end of the session.   Pt received a written copy of exercises to perform at home. Adrienne demonstrated good  understanding of the education provided.     See EMR under patient instructions for exercises given.     Assessment     Adrienne is a 44 y.o. female referred to outpatient Physical Therapy with a medical diagnosis of s/p right shoulder arthroscopic cuff repair and subpectoral biceps tenodesis on 1/16/2020. Pt presents with objective deficits in affected shoulder P/AROM, resisted motion that limits functional ability to reach, lift, carry, push, pull with affected " arm.    Pt prognosis is Excellent.   Pt will benefit from skilled outpatient Physical Therapy to address the deficits stated above and in the chart below, provide pt/family education, and to maximize pt's level of independence.     Plan of care discussed with patient: Yes  Pt's spiritual, cultural and educational needs considered and patient is agreeable to the plan of care and goals as stated below:     Anticipated Barriers for therapy: None    Medical Necessity is demonstrated by the following  History  Co-morbidities and personal factors that may impact the plan of care Co-morbidities:   See chart    Personal Factors:   no deficits     low   Examination  Body Structures and Functions, activity limitations and participation restrictions that may impact the plan of care Body Regions:   upper extremities    Body Systems:    ROM  strength    Participation Restrictions:   None    Activity limitations:   Learning and applying knowledge  no deficits    General Tasks and Commands  no deficits    Communication  no deficits    Mobility  lifting and carrying objects  driving (bike, car, motorcycle)    Self care  no deficits    Domestic Life  no deficits    Interactions/Relationships  no deficits    Life Areas  no deficits    Community and Social Life  no deficits         low   Clinical Presentation stable and uncomplicated low   Decision Making/ Complexity Score: low       Goals:  Short Term Goals:  4 weeks  1.Patient will demonstrate 145 degrees affected shoulder flexion PROM to demonstrate progression to functional AROM.  2.Patient will demonstrate 145 degrees affected shoulder abduction PROM to demonstrate progression to functional AROM.   3.Patient will demonstrate 4/5 strength of affected shoulder ER to increase ease with shoulder elevation for reaching.    Long Term Goals: 24 weeks  1. .Patient will demonstrate 165 degrees affected shoulder flexion and abduction AROM to demonstrate progression to functional  AROM  2.Increase strength to >/= 4+/5 in all planes of shoulder motion to increase tolerance for ADL and work activities.  3. Pt goal: To resume lifting weights    Plan   Certification Period/Plan of care expiration: 1/21/2020 to 7/7/2020.    Outpatient Physical Therapy 1 times weekly for 24 weeks to include the following interventions: manual therapy as needed, therapeutic exercises to address functional deficits, modalities prn, wound care prn, dry needling prn, treatment by a skilled PTA at times.    Hever Kaufman, PT

## 2020-01-27 ENCOUNTER — CLINICAL SUPPORT (OUTPATIENT)
Dept: REHABILITATION | Facility: HOSPITAL | Age: 45
End: 2020-01-27
Attending: PHYSICIAN ASSISTANT
Payer: COMMERCIAL

## 2020-01-27 DIAGNOSIS — M25.511 RIGHT SHOULDER PAIN, UNSPECIFIED CHRONICITY: ICD-10-CM

## 2020-01-27 PROCEDURE — 97140 MANUAL THERAPY 1/> REGIONS: CPT

## 2020-01-27 PROCEDURE — 97110 THERAPEUTIC EXERCISES: CPT

## 2020-01-27 NOTE — PROGRESS NOTES
"                            Physical Therapy Daily Treatment Note     Name: Adrienne Andrew  Clinic Number: [id number]    Therapy Diagnosis:   Encounter Diagnosis   Name Primary?    Right shoulder pain, unspecified chronicity      Physician: Ned Caraballo III, *  Physician Orders: PT Eval and Treat  Medical Diagnosis:   M75.21 (ICD-10-CM) - Biceps tendinitis of right upper extremity   M75.41 (ICD-10-CM) - Subacromial impingement of right shoulder   M75.101 (ICD-10-CM) - Rotator cuff syndrome of right shoulder      Evaluation Date: 1/21/2020  Authorization Period Expiration: 1/12/2021  Plan of Care Certification Period: 7/7/2020  Visit # / Visits authorized: 1/1  Date of Surgery: 1/16/2020  Precautions: Standard    Visit Date: 1/27/2020    Time In: 100p  Time Out: 150p  Total Billable Time: 50 minutes    Subjective      Pt reports: she was compliant with home exercise program given last session.   Response to previous treatment: Patient states that she is still feeling a lot of soreness and pain with small movements and sore from her sling    Pain: 4/10  Location: right shoulder      Objective     Adrienne received therapeutic exercises to develop strength, endurance, ROM, flexibility, posture and core stabilization for 20 minutes including:  Scap squeeze - 20x3"  Pendulum - 2 x 30"  Table slide - 2 x 5    Adrienne received the following manual therapy techniques for 25 minutes, including:  PROM affected shoulder all planes    Home Exercises Provided and Patient Education Provided     Education provided:   Continue current HEP    Written Home Exercises Provided: Continue with current HEP  Exercises were reviewed and Adrienne was able to demonstrate them prior to the end of the session.      Pt received a written copy of exercises to perform at home.   See EMR under patient instructions for exercises given.     Adrienne demonstrated good  understanding of the education provided.     Assessment     Patient " demonstrates slight improvement in PROM elevation, most limited in ER and shoulder abduction ROM    Adrienne is progressing well towards her goals.   Pt prognosis is Excellent.     Pt will continue to benefit from skilled outpatient physical therapy to address the deficits listed in the problem list box on initial evaluation, provide pt/family education and to maximize pt's level of independence in the home and community environment.     Pt's spiritual, cultural and educational needs considered and pt agreeable to plan of care and goals.    Anticipated barriers to physical therapy: None    Goals:   Short Term Goals:  4 weeks  1.Patient will demonstrate 145 degrees affected shoulder flexion PROM to demonstrate progression to functional AROM.  2.Patient will demonstrate 145 degrees affected shoulder abduction PROM to demonstrate progression to functional AROM.   3.Patient will demonstrate 4/5 strength of affected shoulder ER to increase ease with shoulder elevation for reaching.     Long Term Goals: 24 weeks  1. .Patient will demonstrate 165 degrees affected shoulder flexion and abduction AROM to demonstrate progression to functional AROM  2.Increase strength to >/= 4+/5 in all planes of shoulder motion to increase tolerance for ADL and work activities.  3. Pt goal: To resume lifting weights    Plan     Progress shoulder P/AROM, begin to wean brace for decreased stiffness and guarding    Hever Kaufman, PT

## 2020-01-31 ENCOUNTER — OFFICE VISIT (OUTPATIENT)
Dept: SPORTS MEDICINE | Facility: CLINIC | Age: 45
End: 2020-01-31
Payer: COMMERCIAL

## 2020-01-31 VITALS
BODY MASS INDEX: 23.62 KG/M2 | SYSTOLIC BLOOD PRESSURE: 136 MMHG | DIASTOLIC BLOOD PRESSURE: 92 MMHG | WEIGHT: 125 LBS | TEMPERATURE: 98 F | HEART RATE: 85 BPM

## 2020-01-31 DIAGNOSIS — Z98.890 S/P SHOULDER SURGERY: Primary | ICD-10-CM

## 2020-01-31 PROCEDURE — 99024 PR POST-OP FOLLOW-UP VISIT: ICD-10-PCS | Mod: S$GLB,,, | Performed by: PHYSICIAN ASSISTANT

## 2020-01-31 PROCEDURE — 99024 POSTOP FOLLOW-UP VISIT: CPT | Mod: S$GLB,,, | Performed by: PHYSICIAN ASSISTANT

## 2020-01-31 PROCEDURE — 99999 PR PBB SHADOW E&M-EST. PATIENT-LVL III: ICD-10-PCS | Mod: PBBFAC,,, | Performed by: PHYSICIAN ASSISTANT

## 2020-01-31 PROCEDURE — 99999 PR PBB SHADOW E&M-EST. PATIENT-LVL III: CPT | Mod: PBBFAC,,, | Performed by: PHYSICIAN ASSISTANT

## 2020-02-02 NOTE — PROGRESS NOTES
Chief Complaint: 2 week shoulder surgery f/u    HISTORY OF PRESENT ILLNESS:   Pt is here today for first post-operative followup of shoulder arthroscopy.  she is doing well.  We have reviewed patient's findings and discussed plan of care and future treatment options.      Tolerating pain well. Only used tylenol for pain control after surgery.   Wearing sling which is in place.    DATE OF PROCEDURE: 01/16/2020     SURGEON: Charmaine Barr M.D.     OPERATION:   right  1. Shoulder open subpectoral biceps tenodesis (CPT 52669)  2. Shoulder arthroscopic partial thickness cuff debridement  3. Shoulder subacromial decompression  4. Shoulder arthroscopic extensive debridement (anterior, posterior glenohumeral joint, subacromial space) (CPT 52015)     ANESTHESIA:  General                                                                                PHYSICAL EXAMINATION:     Incision sites healed well  No evidence of any erythema, infection or induration  elbow Range of motion full   Minimal effusion  2+ Radial pulses  No swelling                                                                                 ASSESSMENT:                                                                                                                                               1. Status post above, doing well.                                                                                                                               PLAN:                                                                                                                                                     1. PT to continued. Continue increasing ROM as tolerated back to full. Sling for another 1 week then wean out of it.   No lifting over 1 pound for 4 weeks with surgical extremity.   2. Emphasized scapular function.  3. I have discussed return to activity in detail.  4. Patient will see us back in 4 weeks.                                      5. All questions were  answered and the patient should contact us if she  has any questions or concerns in the interim.

## 2020-02-03 ENCOUNTER — CLINICAL SUPPORT (OUTPATIENT)
Dept: REHABILITATION | Facility: HOSPITAL | Age: 45
End: 2020-02-03
Attending: PHYSICIAN ASSISTANT
Payer: COMMERCIAL

## 2020-02-03 DIAGNOSIS — M25.511 RIGHT SHOULDER PAIN, UNSPECIFIED CHRONICITY: ICD-10-CM

## 2020-02-03 PROCEDURE — 97140 MANUAL THERAPY 1/> REGIONS: CPT

## 2020-02-03 PROCEDURE — 97110 THERAPEUTIC EXERCISES: CPT

## 2020-02-03 NOTE — PROGRESS NOTES
"                            Physical Therapy Daily Treatment Note     Name: Adrienne Andrew  Clinic Number: [id number]    Therapy Diagnosis:   Encounter Diagnosis   Name Primary?    Right shoulder pain, unspecified chronicity      Physician: Ned Caraballo III, *  Physician Orders: PT Eval and Treat  Medical Diagnosis:   M75.21 (ICD-10-CM) - Biceps tendinitis of right upper extremity   M75.41 (ICD-10-CM) - Subacromial impingement of right shoulder   M75.101 (ICD-10-CM) - Rotator cuff syndrome of right shoulder      Evaluation Date: 1/21/2020  Authorization Period Expiration: 1/12/2021  Plan of Care Certification Period: 7/7/2020  Visit # / Visits authorized: 3/60  Date of Surgery: 1/16/2020  Precautions: Standard    Visit Date: 2/3/2020    Time In: 100p  Time Out: 150p  Total Billable Time: 50 minutes    Subjective      Pt reports: she was compliant with home exercise program given last session.   Response to previous treatment: Patient states that she is feeling a lot better when she does AAROM at home    Pain: 4/10  Location: right shoulder      Objective     Adrienne received therapeutic exercises to develop strength, endurance, ROM, flexibility, posture and core stabilization for 25 minutes including:  Scap squeeze - 20x3"  Pendulum - 2 x 30"  Pulleys - 10x flex,scap  Prone ext - 2 x 8  Wand press - 2 x 8  AROM flexion to 90 - 5x    Adrienne received the following manual therapy techniques for 25 minutes, including:  PROM affected shoulder all planes    Home Exercises Provided and Patient Education Provided     Education provided:   Continue current HEP    Written Home Exercises Provided: Continue with current HEP  Exercises were reviewed and Adrienne was able to demonstrate them prior to the end of the session.      Pt received a written copy of exercises to perform at home.   See EMR under patient instructions for exercises given.     Adrienne demonstrated good  understanding of the education provided. "     Assessment     Patient demonstrates significant improvement in ER and scaption PROM, encouraged to perform new exercises at home to relieve stiffness in anterior shoulder  Adrienne is progressing well towards her goals.   Pt prognosis is Excellent.     Pt will continue to benefit from skilled outpatient physical therapy to address the deficits listed in the problem list box on initial evaluation, provide pt/family education and to maximize pt's level of independence in the home and community environment.     Pt's spiritual, cultural and educational needs considered and pt agreeable to plan of care and goals.    Anticipated barriers to physical therapy: None    Goals:   Short Term Goals:  4 weeks  1.Patient will demonstrate 145 degrees affected shoulder flexion PROM to demonstrate progression to functional AROM.  2.Patient will demonstrate 145 degrees affected shoulder abduction PROM to demonstrate progression to functional AROM.   3.Patient will demonstrate 4/5 strength of affected shoulder ER to increase ease with shoulder elevation for reaching.     Long Term Goals: 24 weeks  1. .Patient will demonstrate 165 degrees affected shoulder flexion and abduction AROM to demonstrate progression to functional AROM  2.Increase strength to >/= 4+/5 in all planes of shoulder motion to increase tolerance for ADL and work activities.  3. Pt goal: To resume lifting weights    Plan     Progress shoulder P/AROM, begin to wean brace for decreased stiffness and guarding    Hever Kaufman, PT

## 2020-02-10 ENCOUNTER — CLINICAL SUPPORT (OUTPATIENT)
Dept: REHABILITATION | Facility: HOSPITAL | Age: 45
End: 2020-02-10
Attending: PHYSICIAN ASSISTANT
Payer: COMMERCIAL

## 2020-02-10 DIAGNOSIS — M25.511 RIGHT SHOULDER PAIN, UNSPECIFIED CHRONICITY: ICD-10-CM

## 2020-02-10 PROCEDURE — 97110 THERAPEUTIC EXERCISES: CPT

## 2020-02-10 PROCEDURE — 97140 MANUAL THERAPY 1/> REGIONS: CPT

## 2020-02-10 NOTE — PROGRESS NOTES
"                            Physical Therapy Daily Treatment Note     Name: Adrienne Andrew  Clinic Number: [id number]    Therapy Diagnosis:   Encounter Diagnosis   Name Primary?    Right shoulder pain, unspecified chronicity      Physician: Ned Caraballo III, *  Physician Orders: PT Eval and Treat  Medical Diagnosis:   M75.21 (ICD-10-CM) - Biceps tendinitis of right upper extremity   M75.41 (ICD-10-CM) - Subacromial impingement of right shoulder   M75.101 (ICD-10-CM) - Rotator cuff syndrome of right shoulder      Evaluation Date: 1/21/2020  Authorization Period Expiration: 1/12/2021  Plan of Care Certification Period: 7/7/2020  Visit # / Visits authorized: 4/60  Date of Surgery: 1/16/2020  Precautions: Standard    Visit Date: 2/10/2020    Time In: 100p  Time Out: 210p  Total Billable Time: 50 minutes    Subjective      Pt reports: she was compliant with home exercise program given last session.   Response to previous treatment: Patient states that she is feeling tight most of the time at home, feels better with therapy, worried about whether she is progressing or not    Pain: 4/10  Location: right shoulder      Objective     Adrienne received therapeutic exercises to develop strength, endurance, ROM, flexibility, posture and core stabilization for 35 minutes including:  Prone row - 2 x 10, 3"  Prone ext - 2 x 10, 3"  Prone T - 2 x 10  Seated ball roll palms up - 2 x 8  Pulleys - 2' ea  Standing wand ER - 2 x 10  AROM flexion pendulums in supine - 3 x 5  Shoulder prop - 2.5'    Adrienne received the following manual therapy techniques for 25 minutes, including:  PROM affected shoulder all planes    Home Exercises Provided and Patient Education Provided     Education provided:   Continue current HEP    Written Home Exercises Provided: Continue with current HEP  Exercises were reviewed and Adrienne was able to demonstrate them prior to the end of the session.      Pt received a written copy of exercises to " perform at home.   See EMR under patient instructions for exercises given.     Adrienne demonstrated good  understanding of the education provided.     Assessment     Patient had stiffness of inferior joint capsule and pain with attempted inferior glides, instructed in propping shoulder at home  Adrienne is progressing well towards her goals.   Pt prognosis is Excellent.     Pt will continue to benefit from skilled outpatient physical therapy to address the deficits listed in the problem list box on initial evaluation, provide pt/family education and to maximize pt's level of independence in the home and community environment.     Pt's spiritual, cultural and educational needs considered and pt agreeable to plan of care and goals.    Anticipated barriers to physical therapy: None    Goals:   Short Term Goals:  4 weeks  1.Patient will demonstrate 145 degrees affected shoulder flexion PROM to demonstrate progression to functional AROM.  2.Patient will demonstrate 145 degrees affected shoulder abduction PROM to demonstrate progression to functional AROM.   3.Patient will demonstrate 4/5 strength of affected shoulder ER to increase ease with shoulder elevation for reaching.     Long Term Goals: 24 weeks  1. .Patient will demonstrate 165 degrees affected shoulder flexion and abduction AROM to demonstrate progression to functional AROM  2.Increase strength to >/= 4+/5 in all planes of shoulder motion to increase tolerance for ADL and work activities.  3. Pt goal: To resume lifting weights    Plan     Progress shoulder P/AROM, patient will wear brace while sleeping only as needed    Hever Kaufman, PT

## 2020-02-17 ENCOUNTER — CLINICAL SUPPORT (OUTPATIENT)
Dept: REHABILITATION | Facility: HOSPITAL | Age: 45
End: 2020-02-17
Attending: PHYSICIAN ASSISTANT
Payer: COMMERCIAL

## 2020-02-17 DIAGNOSIS — M25.511 RIGHT SHOULDER PAIN, UNSPECIFIED CHRONICITY: ICD-10-CM

## 2020-02-17 PROCEDURE — 97140 MANUAL THERAPY 1/> REGIONS: CPT

## 2020-02-17 PROCEDURE — 97110 THERAPEUTIC EXERCISES: CPT

## 2020-02-17 NOTE — PROGRESS NOTES
"                            Physical Therapy Daily Treatment Note     Name: Adrienne Andrew  Clinic Number: [id number]    Therapy Diagnosis:   Encounter Diagnosis   Name Primary?    Right shoulder pain, unspecified chronicity      Physician: Ned Caraballo III, *  Physician Orders: PT Eval and Treat  Medical Diagnosis:   M75.21 (ICD-10-CM) - Biceps tendinitis of right upper extremity   M75.41 (ICD-10-CM) - Subacromial impingement of right shoulder   M75.101 (ICD-10-CM) - Rotator cuff syndrome of right shoulder      Evaluation Date: 1/21/2020  Authorization Period Expiration: 1/12/2021  Plan of Care Certification Period: 7/7/2020  Visit # / Visits authorized: 5/60  Date of Surgery: 1/16/2020  Precautions: Standard    Visit Date: 2/17/2020    Time In: 100p  Time Out: 210p  Total Billable Time: 50 minutes    Subjective      Pt reports: she was compliant with home exercise program given last session.   Response to previous treatment: Patient states that she is average 5/10 pain the last 2 days but she is doing more and not wearing sling at all    Pain: 4/10  Location: right shoulder      Objective     Adrienne received therapeutic exercises to develop strength, endurance, ROM, flexibility, posture and core stabilization for 35 minutes including:  Prone row - 2 x 10, 3"  Prone ext - 2 x 10, 3"  Prone T - 2 x 10  Seated ball roll palms up - 2 x 8  Pulleys - 2' ea  No money - 2 x 15  Wall serratus - 2 x 10  Shoulder prop - 2.5'    Adrienne received the following manual therapy techniques for 25 minutes, including:  PROM affected shoulder all planes    Home Exercises Provided and Patient Education Provided     Education provided:   Continue current HEP    Written Home Exercises Provided: Continue with current HEP  Exercises were reviewed and Adrienne was able to demonstrate them prior to the end of the session.      Pt received a written copy of exercises to perform at home.   See EMR under patient instructions for " exercises given.     Adrienne demonstrated good  understanding of the education provided.     Assessment     Patient has improved shoulder elevation and decreased pain with PROM shoulder ER.  She is also having improved shoulder motion passively with serratus activation on wall.  Adrienne is progressing well towards her goals.   Pt prognosis is Excellent.     Pt will continue to benefit from skilled outpatient physical therapy to address the deficits listed in the problem list box on initial evaluation, provide pt/family education and to maximize pt's level of independence in the home and community environment.     Pt's spiritual, cultural and educational needs considered and pt agreeable to plan of care and goals.    Anticipated barriers to physical therapy: None    Goals:   Short Term Goals:  4 weeks  1.Patient will demonstrate 145 degrees affected shoulder flexion PROM to demonstrate progression to functional AROM.  2.Patient will demonstrate 145 degrees affected shoulder abduction PROM to demonstrate progression to functional AROM.   3.Patient will demonstrate 4/5 strength of affected shoulder ER to increase ease with shoulder elevation for reaching.     Long Term Goals: 24 weeks  1. .Patient will demonstrate 165 degrees affected shoulder flexion and abduction AROM to demonstrate progression to functional AROM  2.Increase strength to >/= 4+/5 in all planes of shoulder motion to increase tolerance for ADL and work activities.  3. Pt goal: To resume lifting weights    Plan     Progress shoulder AROM for increased scapular mobility with upward rotation and shoulder extension    Hever Kaufman, PT

## 2020-02-24 ENCOUNTER — CLINICAL SUPPORT (OUTPATIENT)
Dept: REHABILITATION | Facility: HOSPITAL | Age: 45
End: 2020-02-24
Attending: PHYSICIAN ASSISTANT
Payer: COMMERCIAL

## 2020-02-24 DIAGNOSIS — M25.511 RIGHT SHOULDER PAIN, UNSPECIFIED CHRONICITY: ICD-10-CM

## 2020-02-24 PROCEDURE — 97140 MANUAL THERAPY 1/> REGIONS: CPT

## 2020-02-24 PROCEDURE — 97110 THERAPEUTIC EXERCISES: CPT

## 2020-02-24 NOTE — PROGRESS NOTES
"                            Physical Therapy Daily Treatment Note     Name: Ardienne Andrew  Clinic Number: [id number]    Therapy Diagnosis:   Encounter Diagnosis   Name Primary?    Right shoulder pain, unspecified chronicity      Physician: Ned Caraballo III, *  Physician Orders: PT Eval and Treat  Medical Diagnosis:   M75.21 (ICD-10-CM) - Biceps tendinitis of right upper extremity   M75.41 (ICD-10-CM) - Subacromial impingement of right shoulder   M75.101 (ICD-10-CM) - Rotator cuff syndrome of right shoulder      Evaluation Date: 1/21/2020  Authorization Period Expiration: 1/12/2021  Plan of Care Certification Period: 7/7/2020  Visit # / Visits authorized: 6/60  Date of Surgery: 1/16/2020  Precautions: Standard    Visit Date: 2/24/2020    Time In: 100p  Time Out: 210p  Total Billable Time: 50 minutes    Subjective      Pt reports: she was compliant with home exercise program given last session.   Response to previous treatment: Patient states that she is worried about how much pain she is having at her shoulder with simple things during the day and sleeping    Pain: 4/10  Location: right shoulder      Objective     Adrienne received therapeutic exercises to develop strength, endurance, ROM, flexibility, posture and core stabilization for 40 minutes including:  Prone row - 2 x 10, 3"  Prone ext - 2 x 10, 3"  Prone T - 2 x 10  Seated ball roll palms up - 2 x 8  Pulleys - 2' ea  No money - 2 x 15  Wall serratus - 2 x 10, 3"  Shoulder prop - 2.5'    Adrienne received the following manual therapy techniques for 15 minutes, including:  PROM affected shoulder all planes    Home Exercises Provided and Patient Education Provided     Education provided:   Continue current HEP    Written Home Exercises Provided: Continue with current HEP  Exercises were reviewed and Adrienne was able to demonstrate them prior to the end of the session.      Pt received a written copy of exercises to perform at home.   See EMR under " patient instructions for exercises given.     Adrienne demonstrated good  understanding of the education provided.     Assessment     Patient has significant improvement in quality of motion in PROM shoulder ER and elevation.  She is still limited by pain but is also doing more activity, transitioning to bed so increased pain may be due to change in activities.  Adrienne is progressing well towards her goals.   Pt prognosis is Excellent.     Pt will continue to benefit from skilled outpatient physical therapy to address the deficits listed in the problem list box on initial evaluation, provide pt/family education and to maximize pt's level of independence in the home and community environment.     Pt's spiritual, cultural and educational needs considered and pt agreeable to plan of care and goals.    Anticipated barriers to physical therapy: None    Goals:   Short Term Goals:  4 weeks  1.Patient will demonstrate 145 degrees affected shoulder flexion PROM to demonstrate progression to functional AROM.  2.Patient will demonstrate 145 degrees affected shoulder abduction PROM to demonstrate progression to functional AROM.   3.Patient will demonstrate 4/5 strength of affected shoulder ER to increase ease with shoulder elevation for reaching.     Long Term Goals: 24 weeks  1. .Patient will demonstrate 165 degrees affected shoulder flexion and abduction AROM to demonstrate progression to functional AROM  2.Increase strength to >/= 4+/5 in all planes of shoulder motion to increase tolerance for ADL and work activities.  3. Pt goal: To resume lifting weights    Plan     Progress shoulder AROM for increased scapular mobility with upward rotation and shoulder extension    Hever Kaufman, PT

## 2020-02-26 ENCOUNTER — OFFICE VISIT (OUTPATIENT)
Dept: SPORTS MEDICINE | Facility: CLINIC | Age: 45
End: 2020-02-26
Payer: COMMERCIAL

## 2020-02-26 VITALS — SYSTOLIC BLOOD PRESSURE: 142 MMHG | HEART RATE: 84 BPM | DIASTOLIC BLOOD PRESSURE: 89 MMHG

## 2020-02-26 DIAGNOSIS — M75.21 BICEPS TENDINITIS OF RIGHT UPPER EXTREMITY: Primary | ICD-10-CM

## 2020-02-26 PROCEDURE — 99999 PR PBB SHADOW E&M-EST. PATIENT-LVL II: ICD-10-PCS | Mod: PBBFAC,,, | Performed by: ORTHOPAEDIC SURGERY

## 2020-02-26 PROCEDURE — 99024 POSTOP FOLLOW-UP VISIT: CPT | Mod: S$GLB,,, | Performed by: ORTHOPAEDIC SURGERY

## 2020-02-26 PROCEDURE — 99024 PR POST-OP FOLLOW-UP VISIT: ICD-10-PCS | Mod: S$GLB,,, | Performed by: ORTHOPAEDIC SURGERY

## 2020-02-26 PROCEDURE — 99999 PR PBB SHADOW E&M-EST. PATIENT-LVL II: CPT | Mod: PBBFAC,,, | Performed by: ORTHOPAEDIC SURGERY

## 2020-02-26 NOTE — PROGRESS NOTES
Chief Complaint: 2 week shoulder surgery f/u    HISTORY OF PRESENT ILLNESS:   Pt is here today for first post-operative followup of shoulder arthroscopy.  she is doing well.  We have reviewed patient's findings and discussed plan of care and future treatment options.      Tolerating pain well. Only used tylenol for pain control after surgery.   Wearing sling which is in place.    DATE OF PROCEDURE: 01/16/2020     SURGEON: Charmaine Barr M.D.     OPERATION:   right  1. Shoulder open subpectoral biceps tenodesis (CPT 43382)  2. Shoulder arthroscopic partial thickness cuff debridement  3. Shoulder subacromial decompression  4. Shoulder arthroscopic extensive debridement (anterior, posterior glenohumeral joint, subacromial space) (CPT 69627)     ANESTHESIA:  General                                                                                PHYSICAL EXAMINATION:     Incision sites healed well  No evidence of any erythema, infection or induration  elbow Range of motion full   Minimal effusion  2+ Radial pulses  No swelling  FE 90  ER30  IR L3  5/5 at 0 and 30                                                                                 ASSESSMENT:                                                                                                                                               1. Status post above, doing well.                                                                                                                               PLAN:                                                                                                                                                     1. PT to continued. Continue increasing ROM as tolerated back to full. Sling for another 1 week then wean out of it.   No lifting over 1 pound for 4 weeks with surgical extremity.   2. Emphasized scapular function.  3. I have discussed return to activity in detail.  4. Patient will see us back in 4 weeks.                                       5. All questions were answered and the patient should contact us if she  has any questions or concerns in the interim.

## 2020-03-02 ENCOUNTER — CLINICAL SUPPORT (OUTPATIENT)
Dept: REHABILITATION | Facility: HOSPITAL | Age: 45
End: 2020-03-02
Attending: PHYSICIAN ASSISTANT
Payer: COMMERCIAL

## 2020-03-02 ENCOUNTER — DOCUMENTATION ONLY (OUTPATIENT)
Dept: REHABILITATION | Facility: HOSPITAL | Age: 45
End: 2020-03-02

## 2020-03-02 DIAGNOSIS — M25.511 RIGHT SHOULDER PAIN, UNSPECIFIED CHRONICITY: ICD-10-CM

## 2020-03-02 PROCEDURE — 97140 MANUAL THERAPY 1/> REGIONS: CPT

## 2020-03-02 PROCEDURE — 97110 THERAPEUTIC EXERCISES: CPT

## 2020-03-02 NOTE — PROGRESS NOTES
Date: 3/2/20    Patient: Adrienne Andrew    Visit #: 1    Start Time: 1:55 PM  Stop Time: 2:10 PM      Patient Aleksandar Deleon arrived for her 7th therapy visit, but our first meeting. Adrienne is a 44-year-old post-op shoulder patient. She currently works as a  and used to exercise regularly and would like to again. She had a biceps tenodesis on 1/16/20.     Disclosure  I informed the patient of my qualifications and background. I then explained that our sessions will remain confidential unless it benefits the medical team. The patient acknowledged understood our confidentiality agreement. The patient was receptive and interested in working with me.    Discussion  I explained my role and how mental performance coaching can benefit their progress in therapy. After feeling shoulder pain Adrienne tried therapy but it did not relieve her pain. This led to Adrienne needing the surgery.     Adrienne has displayed frustration in therapy. Most of this stems from lack of information going into her surgery on timelines, what she can and cannot do, and overall expectations of her outcomes. This is normal frustration for post-op patients. I shared my experience from my shoulder surgery, which helped Adrienne feel better about where she is now. I also shared the concept of therapy patients making a list of things they can do and a list of things they cant do. I encouraged Adrienne to focus on the list of things she can do; focus on things she has already accomplished.     Adrienne responded very well to our conversation. She appreciated hearing that timelines for recovery vary for each patient and hearing my shoulder recovery story. She also appreciated the ideas of focusing on the positives. Adrienne did not display any emotions that made me believe she needs formal mental performance coaching.     Follow Up  I will continue to be present during [facility] therapy appointments just to check in and help her find positives.     Aelx  Jaxson  Mental Performance

## 2020-03-03 NOTE — PROGRESS NOTES
"                            Physical Therapy Daily Treatment Note     Name: Adrienne Andrew  Clinic Number: [id number]    Therapy Diagnosis:   Encounter Diagnosis   Name Primary?    Right shoulder pain, unspecified chronicity      Physician: Ned Caraballo III, *  Physician Orders: PT Eval and Treat  Medical Diagnosis:   M75.21 (ICD-10-CM) - Biceps tendinitis of right upper extremity   M75.41 (ICD-10-CM) - Subacromial impingement of right shoulder   M75.101 (ICD-10-CM) - Rotator cuff syndrome of right shoulder      Evaluation Date: 1/21/2020  Authorization Period Expiration: 1/12/2021  Plan of Care Certification Period: 7/7/2020  Visit # / Visits authorized: 7/60  Date of Surgery: 1/16/2020  Precautions: Standard    Visit Date: 3/2/2020    Time In: 100p  Time Out: 210p  Total Billable Time: 50 minutes    Subjective      Pt reports: she was compliant with home exercise program given last session.   Response to previous treatment: Patient states that she is feeling better, less pain with moving her arm and sleeping without the sling    Pain: 4/10  Location: right shoulder      Objective     Adrienne received therapeutic exercises to develop strength, endurance, ROM, flexibility, posture and core stabilization for 40 minutes including:  Prone row - 2 x 10, 3"  Prone ext - 2 x 10, 3"  Prone T - 2 x 10  ER/IR step out - 2 x 10, otb  Seated ball roll palms up - 2 x 8  Pulleys - 2' ea  No money - 2 x 15  Shoulder prop - with heat x 10'    Adrienne received the following manual therapy techniques for 15 minutes, including:  PROM affected shoulder all planes    Home Exercises Provided and Patient Education Provided     Education provided:   Continue current HEP    Written Home Exercises Provided: Continue with current HEP  Exercises were reviewed and Adrienne was able to demonstrate them prior to the end of the session.      Pt received a written copy of exercises to perform at home.   See EMR under patient instructions " for exercises given.     Adrienne demonstrated good  understanding of the education provided.     Assessment     Patient demonstrates improved AROM and tolerance for light resisted motion  Adrienne is progressing well towards her goals.   Pt prognosis is Excellent.     Pt will continue to benefit from skilled outpatient physical therapy to address the deficits listed in the problem list box on initial evaluation, provide pt/family education and to maximize pt's level of independence in the home and community environment.     Pt's spiritual, cultural and educational needs considered and pt agreeable to plan of care and goals.    Anticipated barriers to physical therapy: None    Goals:   Short Term Goals:  4 weeks  1.Patient will demonstrate 145 degrees affected shoulder flexion PROM to demonstrate progression to functional AROM.  2.Patient will demonstrate 145 degrees affected shoulder abduction PROM to demonstrate progression to functional AROM.   3.Patient will demonstrate 4/5 strength of affected shoulder ER to increase ease with shoulder elevation for reaching.     Long Term Goals: 24 weeks  1. .Patient will demonstrate 165 degrees affected shoulder flexion and abduction AROM to demonstrate progression to functional AROM  2.Increase strength to >/= 4+/5 in all planes of shoulder motion to increase tolerance for ADL and work activities.  3. Pt goal: To resume lifting weights    Plan     Progress shoulder AROM for increased scapular mobility with upward rotation and shoulder extension    Hever Kaufman, PT

## 2020-03-06 ENCOUNTER — CLINICAL SUPPORT (OUTPATIENT)
Dept: REHABILITATION | Facility: HOSPITAL | Age: 45
End: 2020-03-06
Attending: PHYSICIAN ASSISTANT
Payer: COMMERCIAL

## 2020-03-06 DIAGNOSIS — M25.511 RIGHT SHOULDER PAIN, UNSPECIFIED CHRONICITY: ICD-10-CM

## 2020-03-06 PROCEDURE — 97110 THERAPEUTIC EXERCISES: CPT

## 2020-03-06 PROCEDURE — 97140 MANUAL THERAPY 1/> REGIONS: CPT

## 2020-03-06 NOTE — PROGRESS NOTES
Physical Therapy Daily Treatment Note     Name: Adrienne Andrew  Clinic Number: [id number]    Therapy Diagnosis:   Encounter Diagnosis   Name Primary?    Right shoulder pain, unspecified chronicity      Physician: Ned Caraballo III, *  Physician Orders: PT Eval and Treat  Medical Diagnosis:   M75.21 (ICD-10-CM) - Biceps tendinitis of right upper extremity   M75.41 (ICD-10-CM) - Subacromial impingement of right shoulder   M75.101 (ICD-10-CM) - Rotator cuff syndrome of right shoulder      Evaluation Date: 1/21/2020  Authorization Period Expiration: 1/12/2021  Plan of Care Certification Period: 7/7/2020  Visit # / Visits authorized: 8/60  Date of Surgery: 1/16/2020  Precautions: Standard    Visit Date: 3/6/2020    Time In: 900a  Time Out: 1000a  Total Billable Time: 50 minutes    Subjective      Pt reports: she was compliant with home exercise program given last session.   Response to previous treatment: Patient states that she is doing better, having less pain overall but finds herself doing more things on accident    Pain: 4/10  Location: right shoulder      Objective     Adrienne received therapeutic exercises to develop strength, endurance, ROM, flexibility, posture and core stabilization for 40 minutes including:  Prone row - 2 x 10, 4#  Prone ext - 2 x 10, 1#  Prone T - 2 x 10  ER/IR step out - 2 x 10, otb  Wall serratus - 2 x 10  Pulleys - 2' ea  No money - 2 x 15  Shoulder prop - with heat x 10'    Adrienne received the following manual therapy techniques for 15 minutes, including:  PROM affected shoulder all planes    Home Exercises Provided and Patient Education Provided     Education provided:   Continue current HEP    Written Home Exercises Provided: Continue with current HEP  Exercises were reviewed and Adrienne was able to demonstrate them prior to the end of the session.      Pt received a written copy of exercises to perform at home.   See EMR under patient instructions  for exercises given.     Adrienne demonstrated good  understanding of the education provided.     Assessment     Patient demonstrates improving tolerance for AROM, still limited by movement pattern and upper trap substitution  Adrienne is progressing well towards her goals.   Pt prognosis is Excellent.     Pt will continue to benefit from skilled outpatient physical therapy to address the deficits listed in the problem list box on initial evaluation, provide pt/family education and to maximize pt's level of independence in the home and community environment.     Pt's spiritual, cultural and educational needs considered and pt agreeable to plan of care and goals.    Anticipated barriers to physical therapy: None    Goals:   Short Term Goals:  4 weeks  1.Patient will demonstrate 145 degrees affected shoulder flexion PROM to demonstrate progression to functional AROM.  2.Patient will demonstrate 145 degrees affected shoulder abduction PROM to demonstrate progression to functional AROM.   3.Patient will demonstrate 4/5 strength of affected shoulder ER to increase ease with shoulder elevation for reaching.     Long Term Goals: 24 weeks  1. .Patient will demonstrate 165 degrees affected shoulder flexion and abduction AROM to demonstrate progression to functional AROM  2.Increase strength to >/= 4+/5 in all planes of shoulder motion to increase tolerance for ADL and work activities.  3. Pt goal: To resume lifting weights    Plan     Progress shoulder AROM for increased scapular mobility with upward rotation and shoulder extension    Hever Kaufman, PT

## 2020-03-09 ENCOUNTER — CLINICAL SUPPORT (OUTPATIENT)
Dept: REHABILITATION | Facility: HOSPITAL | Age: 45
End: 2020-03-09
Attending: PHYSICIAN ASSISTANT
Payer: COMMERCIAL

## 2020-03-09 ENCOUNTER — HOSPITAL ENCOUNTER (OUTPATIENT)
Dept: RADIOLOGY | Facility: HOSPITAL | Age: 45
Discharge: HOME | End: 2020-03-09
Attending: INTERNAL MEDICINE
Payer: COMMERCIAL

## 2020-03-09 DIAGNOSIS — R93.89 ABNORMAL PELVIC ULTRASOUND: ICD-10-CM

## 2020-03-09 DIAGNOSIS — M25.511 RIGHT SHOULDER PAIN, UNSPECIFIED CHRONICITY: ICD-10-CM

## 2020-03-09 PROCEDURE — 76856 US PELVIS COMP WITH TRANSVAG NON-OB (XPD): ICD-10-PCS | Mod: 26,,, | Performed by: RADIOLOGY

## 2020-03-09 PROCEDURE — 97110 THERAPEUTIC EXERCISES: CPT

## 2020-03-09 PROCEDURE — 76856 US EXAM PELVIC COMPLETE: CPT | Mod: 26,,, | Performed by: RADIOLOGY

## 2020-03-09 PROCEDURE — 76830 TRANSVAGINAL US NON-OB: CPT | Mod: 26,,, | Performed by: RADIOLOGY

## 2020-03-09 PROCEDURE — 97140 MANUAL THERAPY 1/> REGIONS: CPT

## 2020-03-09 PROCEDURE — 76830 US PELVIS COMP WITH TRANSVAG NON-OB (XPD): ICD-10-PCS | Mod: 26,,, | Performed by: RADIOLOGY

## 2020-03-09 PROCEDURE — 76830 TRANSVAGINAL US NON-OB: CPT | Mod: TC

## 2020-03-09 NOTE — PROGRESS NOTES
Physical Therapy Daily Treatment Note     Name: Adrienne Andrew  Clinic Number: [id number]    Therapy Diagnosis:   Encounter Diagnosis   Name Primary?    Right shoulder pain, unspecified chronicity      Physician: Ned Caraballo III, *  Physician Orders: PT Eval and Treat  Medical Diagnosis:   M75.21 (ICD-10-CM) - Biceps tendinitis of right upper extremity   M75.41 (ICD-10-CM) - Subacromial impingement of right shoulder   M75.101 (ICD-10-CM) - Rotator cuff syndrome of right shoulder      Evaluation Date: 1/21/2020  Authorization Period Expiration: 1/12/2021  Plan of Care Certification Period: 7/7/2020  Visit # / Visits authorized: 9/60  Date of Surgery: 1/16/2020  Precautions: Standard    Visit Date: 3/9/2020    Time In: 100p  Time Out: 200p  Total Billable Time: 50 minutes  g  Subjective      Pt reports: she was compliant with home exercise program given last session.   Response to previous treatment: Patient states that she is feeling about the same, intermittent pain during the day but sleeping is better    Pain: 4/10  Location: right shoulder      Objective     Adrienne received therapeutic exercises to develop strength, endurance, ROM, flexibility, posture and core stabilization for 40 minutes including:  Prone row - 2 x 10, 4#  Prone ext - 2 x 10, 1#  Prone T - 2 x 10  ER/IR step out - 2 x 10, otb  Wall serratus - 2 x 10  Pulleys - 2' ea  No money - 2 x 15  Shoulder prop - with heat x 10'    Adrienne received the following manual therapy techniques for 15 minutes, including:  PROM affected shoulder all planes    Home Exercises Provided and Patient Education Provided     Education provided:   Continue current HEP    Written Home Exercises Provided: Continue with current HEP  Exercises were reviewed and Adrienne was able to demonstrate them prior to the end of the session.      Pt received a written copy of exercises to perform at home.   See EMR under patient instructions for  exercises given.     Adrienne demonstrated good  understanding of the education provided.     Assessment     Patient demonstrates decreased pain with passive shoulder ER and flexion, more functional AROM in front with reaching  Adrienne is progressing well towards her goals.   Pt prognosis is Excellent.     Pt will continue to benefit from skilled outpatient physical therapy to address the deficits listed in the problem list box on initial evaluation, provide pt/family education and to maximize pt's level of independence in the home and community environment.     Pt's spiritual, cultural and educational needs considered and pt agreeable to plan of care and goals.    Anticipated barriers to physical therapy: None    Goals:   Short Term Goals:  4 weeks  1.Patient will demonstrate 145 degrees affected shoulder flexion PROM to demonstrate progression to functional AROM.  2.Patient will demonstrate 145 degrees affected shoulder abduction PROM to demonstrate progression to functional AROM.   3.Patient will demonstrate 4/5 strength of affected shoulder ER to increase ease with shoulder elevation for reaching.     Long Term Goals: 24 weeks  1. .Patient will demonstrate 165 degrees affected shoulder flexion and abduction AROM to demonstrate progression to functional AROM  2.Increase strength to >/= 4+/5 in all planes of shoulder motion to increase tolerance for ADL and work activities.  3. Pt goal: To resume lifting weights    Plan     Progress shoulder AROM for increased scapular mobility with upward rotation and shoulder extension    Hever Kaufman, PT

## 2020-03-13 ENCOUNTER — CLINICAL SUPPORT (OUTPATIENT)
Dept: REHABILITATION | Facility: HOSPITAL | Age: 45
End: 2020-03-13
Attending: PHYSICIAN ASSISTANT
Payer: COMMERCIAL

## 2020-03-13 DIAGNOSIS — M25.511 RIGHT SHOULDER PAIN, UNSPECIFIED CHRONICITY: ICD-10-CM

## 2020-03-13 PROCEDURE — 97140 MANUAL THERAPY 1/> REGIONS: CPT

## 2020-03-13 PROCEDURE — 97110 THERAPEUTIC EXERCISES: CPT

## 2020-03-16 NOTE — PROGRESS NOTES
"                            Physical Therapy Daily Treatment Note     Name: Adrienne Andrew  Clinic Number: [id number]    Therapy Diagnosis:   Encounter Diagnosis   Name Primary?    Right shoulder pain, unspecified chronicity      Physician: Ned Caraballo III, *  Physician Orders: PT Eval and Treat  Medical Diagnosis:   M75.21 (ICD-10-CM) - Biceps tendinitis of right upper extremity   M75.41 (ICD-10-CM) - Subacromial impingement of right shoulder   M75.101 (ICD-10-CM) - Rotator cuff syndrome of right shoulder      Evaluation Date: 1/21/2020  Authorization Period Expiration: 1/12/2021  Plan of Care Certification Period: 7/7/2020  Visit # / Visits authorized: 10/60  Date of Surgery: 1/16/2020  Precautions: Standard    Visit Date: 3/13/2020    Time In: 1000a  Time Out: 1100a  Total Billable Time: 50 minutes  g  Subjective      Pt reports: she was compliant with home exercise program given last session.   Response to previous treatment: Patient states that she is feeling about the same, still feels a good bit of soreness and pain with activity during the day  Pain: 4/10  Location: right shoulder      Objective     Adrienne received therapeutic exercises to develop strength, endurance, ROM, flexibility, posture and core stabilization for 40 minutes including:  Prone row - 2 x 10, 4#  Prone T - 2 x 10  ER/IR step out - 2 x 10, otb  Bodyblade - 3 x 30" at side  Ball on wall - 3 x 20"  Wall serratus - 2 x 10  Pulleys - 2' ea  No money - 2 x 15  Shoulder prop - with heat x 10'    Adrienne received the following manual therapy techniques for 15 minutes, including:  PROM affected shoulder all planes    Home Exercises Provided and Patient Education Provided     Education provided:   Continue current HEP    Written Home Exercises Provided: Continue with current HEP  Exercises were reviewed and Adrienne was able to demonstrate them prior to the end of the session.      Pt received a written copy of exercises to perform at " home.   See EMR under patient instructions for exercises given.     Adrienne demonstrated good  understanding of the education provided.     Assessment     Patient demonstrates improving shoulder PROM in rotation planes, limited more with guarding for overhead movement  Adrienne is progressing well towards her goals.   Pt prognosis is Excellent.     Pt will continue to benefit from skilled outpatient physical therapy to address the deficits listed in the problem list box on initial evaluation, provide pt/family education and to maximize pt's level of independence in the home and community environment.     Pt's spiritual, cultural and educational needs considered and pt agreeable to plan of care and goals.    Anticipated barriers to physical therapy: None    Goals:   Short Term Goals:  4 weeks  1.Patient will demonstrate 145 degrees affected shoulder flexion PROM to demonstrate progression to functional AROM.  2.Patient will demonstrate 145 degrees affected shoulder abduction PROM to demonstrate progression to functional AROM.   3.Patient will demonstrate 4/5 strength of affected shoulder ER to increase ease with shoulder elevation for reaching.     Long Term Goals: 24 weeks  1. .Patient will demonstrate 165 degrees affected shoulder flexion and abduction AROM to demonstrate progression to functional AROM  2.Increase strength to >/= 4+/5 in all planes of shoulder motion to increase tolerance for ADL and work activities.  3. Pt goal: To resume lifting weights    Plan     Progress shoulder AROM for increased scapular mobility with upward rotation and shoulder extension    Hever Kaufamn, PT

## 2020-03-18 ENCOUNTER — TELEPHONE (OUTPATIENT)
Dept: SPORTS MEDICINE | Facility: CLINIC | Age: 45
End: 2020-03-18

## 2020-03-18 NOTE — TELEPHONE ENCOUNTER
I called the patient and canceled her appointment per her request. I informed her that we would get her a follow up appointment as soon as we are able to

## 2020-05-08 ENCOUNTER — TELEPHONE (OUTPATIENT)
Dept: SPORTS MEDICINE | Facility: CLINIC | Age: 45
End: 2020-05-08

## 2020-05-24 NOTE — PROGRESS NOTES
Pt is 44 y.o. here for evaluation of endometriosis, which was diagnosed in 2018.  She saw PCP in  March (see below)and she ordered an U/S which showed interval enlargement of an ovarian endometrioma.  She has been on no treatment to suppress endometriosis.  She asked for progesterone and tried it only x 1 week because had a migraine and was unsure if related.  She has painful periods.  The pain is described as aching and one episode of sharp pain 10/10, and is 6/10 in intensity. Pain is located in the LLQ area without radiation. Onset was gradual occurring 2 months ago. Symptoms have been unchanged since. Aggravating factors: none. Alleviating factors: NSAIDs and ice. Associated symptoms: none. The patient denies chills, constipation, diarrhea, fever, nausea and vomiting. Risk factors for pelvic/abdominal pain include endometriosis. Patient's last menstrual period was 05/07/2020 (exact date).   She wants a natural approach.    5/25/2020:  Transabdominal sonography of the pelvis was performed, followed by transvaginal sonography to better evaluate the uterus and ovaries.    COMPARISON:  03/09/2020    FINDINGS:  Uterus:  Size: 8.6 x 5.1 x 6.5 cm  Masses: None  Endometrium: Normal in this pre menopausal patient, measuring 9 mm.    Right ovary:  Size: 4.5 x 3.6 x 4.5 cm  Appearance: Complex cystic lesion 3.5 cm.  Vascular flow: Normal.    Left ovary:  Size: 5.2 x 3.6 x 4.4 cm  Appearance: Complex cystic lesion measures 5.1 cm maximum diameter  Vascular Flow: Normal.    Free Fluid: None.      Impression     Bilateral endometriomas are similar in appearance but minimally decreased in siz       Transabdominal sonography of the pelvis was performed, followed by transvaginal sonography:  3/9/2020  COMPARISON:  None.  FINDINGS:  Uterus:  Size: 9.1 x 5.4 x 6.9 cm.  Masses: None  Endometrium: Normal in this pre menopausal patient, measuring 11 mm.    Right ovary:  Size: 5.0 x 3.8 x 6.2 cm  Appearance: Interval enlargement of  previously identified endometrioma, measures 3.8 x 3.1 x 4.4 cm (previously 1.4 x 1.9 x 3.9 cm).  Vascular flow: Normal.    Left ovary:  Size: 7.8 x 5.6 x 7.0 cm  Appearance: Interval enlargement of previously identified endometrioma, measures 6.2 x 3.2 x 5.9 cm (previously 4.4 x 3.3 x 4.5 cm).  A complex cyst is identified, measures 5.5 x 4.2 x 4.5 cm possibly hemorrhagic cyst.  Vascular Flow: Normal.    Free Fluid: None.      Impression       1. Interval enlargement of previously identified endometriomas in both ovaries, as above.  A hemorrhagic cyst is identified in the left ovary.  2. The uterus is unremarkable.       OB History    Para Term  AB Living   4 3 3   1 3   SAB TAB Ectopic Multiple Live Births   1       3      # Outcome Date GA Lbr Sincere/2nd Weight Sex Delivery Anes PTL Lv   4 Term  40w0d  3.289 kg (7 lb 4 oz) F Vag-Spont   DANILO   3 Term  40w0d  3.175 kg (7 lb) M Vag-Spont   DANILO   2 Term  40w0d  2.948 kg (6 lb 8 oz) M Vag-Spont   DANILO   1 SAB               Obstetric Comments   Menarche ~ 14     Past Medical History:   Diagnosis Date    Anemia      Past Surgical History:   Procedure Laterality Date    ARTHROSCOPIC DEBRIDEMENT OF SHOULDER Right 2020    Procedure: PARTIAL THICKNESS CUFF DEBRIDEMENT, SHOULDER, ARTHROSCOPIC;  Surgeon: Charmaine Barr MD;  Location: [facility] OR;  Service: Orthopedics;  Laterality: Right;    ARTHROSCOPY OF SHOULDER WITH DECOMPRESSION OF SUBACROMIAL SPACE Right 2020    Procedure: ARTHROSCOPY, SHOULDER, WITH SUBACROMIAL SPACE DECOMPRESSION EXTENSIVE DEBRIDMENT;  Surgeon: Charmaine Barr MD;  Location: [facility] OR;  Service: Orthopedics;  Laterality: Right;    DILATION AND CURETTAGE OF UTERUS      AB    FIXATION OF TENDON Right 2020    Procedure: FIXATION, TENDON, subpectoral bicep tenodesis;  Surgeon: Charmaine Barr MD;  Location: [facility] OR;  Service: Orthopedics;  Laterality: Right;  FIXATION, TENDON, bicep tenodesis    VAGINAL DELIVERY  , ,      Boy/Boy/Girl     WISDOM TOOTH EXTRACTION       Family History   Problem Relation Age of Onset    Hyperthyroidism Mother     No Known Problems Brother     No Known Problems Brother     No Known Problems Brother     Amblyopia Neg Hx     Blindness Neg Hx     Cancer Neg Hx     Diabetes Neg Hx     Cataracts Neg Hx     Glaucoma Neg Hx     Hypertension Neg Hx     Macular degeneration Neg Hx     Retinal detachment Neg Hx     Strabismus Neg Hx     Stroke Neg Hx     Thyroid disease Neg Hx     Celiac disease Neg Hx     Colon cancer Neg Hx     Crohn's disease Neg Hx     Breast cancer Neg Hx     Ovarian cancer Neg Hx      Social History     Socioeconomic History    Marital status:      Spouse name: Not on file    Number of children: Not on file    Years of education: Not on file    Highest education level: Not on file   Occupational History    Occupation:    Social Needs    Financial resource strain: Not on file    Food insecurity:     Worry: Not on file     Inability: Not on file    Transportation needs:     Medical: Not on file     Non-medical: Not on file   Tobacco Use    Smoking status: Never Smoker    Smokeless tobacco: Never Used   Substance and Sexual Activity    Alcohol use: Yes     Alcohol/week: 0.0 standard drinks     Comment: socially    Drug use: No    Sexual activity: Yes     Partners: Male     Birth control/protection: Partner-Vasectomy     Comment: :     Lifestyle    Physical activity:     Days per week: 3 days     Minutes per session: 40 min    Stress: Rather much   Relationships    Social connections:     Talks on phone: Not on file     Gets together: Not on file     Attends Rastafarian service: Not on file     Active member of club or organization: Not on file     Attends meetings of clubs or organizations: Not on file     Relationship status: Not on file   Other Topics Concern    Not on file   Social History Narrative    Originally from [place], [place]  "   Moved to [facility] in 2011    Living in [facility] with family and         Getting reg exercise      Review of patient's allergies indicates:  No Known Allergies  Current Outpatient Medications on File Prior to Visit   Medication Sig Dispense Refill    brimonidine (LUMIFY) 0.025 % Drop Place 1 drop into both eyes as needed. 7.5 mL 11    ferrous sulfate (FEOSOL) 325 mg (65 mg iron) Tab tablet Take 325 mg by mouth daily with breakfast.      [DISCONTINUED] aspirin (ECOTRIN) 81 MG EC tablet Take 1 tablet (81 mg total) by mouth once daily. For 4 weeks starting the day after surgery. (Patient not taking: Reported on 1/31/2020) 28 tablet 0    [DISCONTINUED] HYDROcodone-acetaminophen (NORCO) 7.5-325 mg per tablet Take 1 tablet by mouth every 4 to 6 hours as needed for Pain. (Patient not taking: Reported on 1/31/2020) 15 tablet 0    [DISCONTINUED] promethazine (PHENERGAN) 25 MG tablet Take 1 tablet (25 mg total) by mouth every 6 (six) hours as needed for Nausea. (Patient not taking: Reported on 1/31/2020) 12 tablet 0    [DISCONTINUED] traMADol (ULTRAM) 50 mg tablet Take 1-2 tablets ( mg total) by mouth every 6 (six) hours as needed. (Patient not taking: Reported on 1/31/2020) 21 tablet 0     No current facility-administered medications on file prior to visit.        Review of Systems:  General: No fever, chills, fatigue or weight loss.  Chest: No chest pain, shortness of breath, or palpitations.  Breast: No pain, masses, or nipple discharge.  Vulva: No pain, lesions, or itching.  Vagina: No relaxation, pain, itching, discharge, or lesions.  Abdomen: No pain, nausea, vomiting, diarrhea, or constipation.  Urinary: No incontinence, nocturia, frequency, or dysuria.  Extremities:  No leg cramps, edema, or calf pain.  Neurologic: No headaches, dizziness, or visual changes.    Vitals:  Vitals:    05/25/20 0924   BP: 124/82   Weight: 56 kg (123 lb 7.3 oz)   Height: 5' 1" (1.549 m)   PainSc:   1   PainLoc: Abdomen     Body " mass index is 23.33 kg/m².    Physical Exam:  General:  Well developed, well nourished, and in no apparent distress.  HEENT:  Normal sclera.  No thyromegaly.  Abdomen:  Soft, nontender, nondistended, positive bowel sounds, no rebound or guarding.  External genitalia:  No lesions, erythema, rash, or other abnormalities.  Urethra:  No lesions or discharge.   Vagina:  No lesions, discharge, or tenderness.  Cervix:  No lesions, discharge, or cervical motion tenderness.   Uterus:  Normal in size and shape.  Mobile and nontender.   Adnexa:  No masses or tenderness.    Assessment and Plan:  Endometrioma of ovary  -     US Pelvis Comp with Transvag NON-OB (xpd; Future; Expected date: 05/25/2020  -     progesterone (PROMETRIUM) 200 MG capsule; Take 1 capsule (200 mg total) by mouth once daily.  Dispense: 30 capsule; Refill: 11    Can increase progesterone if needed.  Also discussed adding testosterone if needed to shrink endometrioma  Discussed repeating U/S in 3 months.  If pain increases before then, she will call me.  Torsion precautions.  I spent 30 minutes face to face with the patient today.

## 2020-05-25 ENCOUNTER — OFFICE VISIT (OUTPATIENT)
Dept: OBSTETRICS AND GYNECOLOGY | Facility: CLINIC | Age: 45
End: 2020-05-25
Attending: OBSTETRICS & GYNECOLOGY
Payer: COMMERCIAL

## 2020-05-25 VITALS
WEIGHT: 123.44 LBS | HEIGHT: 61 IN | DIASTOLIC BLOOD PRESSURE: 82 MMHG | SYSTOLIC BLOOD PRESSURE: 124 MMHG | BODY MASS INDEX: 23.3 KG/M2

## 2020-05-25 DIAGNOSIS — N80.129 ENDOMETRIOMA OF OVARY: Primary | ICD-10-CM

## 2020-05-25 PROCEDURE — 3008F PR BODY MASS INDEX (BMI) DOCUMENTED: ICD-10-PCS | Mod: CPTII,S$GLB,, | Performed by: OBSTETRICS & GYNECOLOGY

## 2020-05-25 PROCEDURE — 99999 PR PBB SHADOW E&M-EST. PATIENT-LVL III: CPT | Mod: PBBFAC,,, | Performed by: OBSTETRICS & GYNECOLOGY

## 2020-05-25 PROCEDURE — 99214 OFFICE O/P EST MOD 30 MIN: CPT | Mod: S$GLB,,, | Performed by: OBSTETRICS & GYNECOLOGY

## 2020-05-25 PROCEDURE — 3008F BODY MASS INDEX DOCD: CPT | Mod: CPTII,S$GLB,, | Performed by: OBSTETRICS & GYNECOLOGY

## 2020-05-25 PROCEDURE — 99999 PR PBB SHADOW E&M-EST. PATIENT-LVL III: ICD-10-PCS | Mod: PBBFAC,,, | Performed by: OBSTETRICS & GYNECOLOGY

## 2020-05-25 PROCEDURE — 99214 PR OFFICE/OUTPT VISIT, EST, LEVL IV, 30-39 MIN: ICD-10-PCS | Mod: S$GLB,,, | Performed by: OBSTETRICS & GYNECOLOGY

## 2020-05-25 RX ORDER — PROGESTERONE 200 MG/1
200 CAPSULE ORAL DAILY
Qty: 30 CAPSULE | Refills: 11 | Status: SHIPPED | OUTPATIENT
Start: 2020-05-25 | End: 2021-06-14 | Stop reason: SDUPTHER

## 2020-10-06 ENCOUNTER — OFFICE VISIT (OUTPATIENT)
Dept: OBSTETRICS AND GYNECOLOGY | Facility: CLINIC | Age: 45
End: 2020-10-06
Attending: OBSTETRICS & GYNECOLOGY
Payer: COMMERCIAL

## 2020-10-06 ENCOUNTER — PATIENT MESSAGE (OUTPATIENT)
Dept: OBSTETRICS AND GYNECOLOGY | Facility: CLINIC | Age: 45
End: 2020-10-06

## 2020-10-06 VITALS — HEIGHT: 62 IN | BODY MASS INDEX: 23.4 KG/M2 | WEIGHT: 127.13 LBS

## 2020-10-06 DIAGNOSIS — N80.129 ENDOMETRIOMA OF OVARY: Primary | ICD-10-CM

## 2020-10-06 PROBLEM — R93.89 ABNORMAL PELVIC ULTRASOUND: Status: RESOLVED | Noted: 2020-01-08 | Resolved: 2020-10-06

## 2020-10-06 PROCEDURE — 3008F BODY MASS INDEX DOCD: CPT | Mod: CPTII,S$GLB,, | Performed by: OBSTETRICS & GYNECOLOGY

## 2020-10-06 PROCEDURE — 99213 PR OFFICE/OUTPT VISIT, EST, LEVL III, 20-29 MIN: ICD-10-PCS | Mod: S$GLB,,, | Performed by: OBSTETRICS & GYNECOLOGY

## 2020-10-06 PROCEDURE — 99999 PR PBB SHADOW E&M-EST. PATIENT-LVL III: ICD-10-PCS | Mod: PBBFAC,,, | Performed by: OBSTETRICS & GYNECOLOGY

## 2020-10-06 PROCEDURE — 99999 PR PBB SHADOW E&M-EST. PATIENT-LVL III: CPT | Mod: PBBFAC,,, | Performed by: OBSTETRICS & GYNECOLOGY

## 2020-10-06 PROCEDURE — 99213 OFFICE O/P EST LOW 20 MIN: CPT | Mod: S$GLB,,, | Performed by: OBSTETRICS & GYNECOLOGY

## 2020-10-06 PROCEDURE — 3008F PR BODY MASS INDEX (BMI) DOCUMENTED: ICD-10-PCS | Mod: CPTII,S$GLB,, | Performed by: OBSTETRICS & GYNECOLOGY

## 2020-10-06 NOTE — PROGRESS NOTES
History of Present Illness:  Patient is a 44 y.o. female who is here for follow up of bilateral endometriomas.  One side has incraesed slightly but the other side has decreased.  She complains of bloating on progesterone 200 mg PO.   Patient's last menstrual period was 09/20/2020.     Ultrasound 10/6/2020:  Uterus:   Size: 8.3 x 4.7 x 6.3 cm   The parenchyma is homogeneous.  Endometrium is normal caliber measuring 0.6 cm.   Right ovary:   Size: 6.2 x 3.6 x 3.8 cm.  Hypoechoic focus with diffuse homogeneous low level echoes and posterior acoustic enhancement measures 3.8 x 3.7 x 3.4 cm (previously 3.5 x 3.5 x 3.4 cm).   Left ovary:   Size: 4.3 x 4.1 x 4.1 cm.  Hypoechoic focus with diffuse homogeneous low-level echoes and posterior acoustic enhancement measures 2.9 x 2.7 x 2.3 cm (previously 4 x 2.9 x 5.1 cm).  Anechoic focus measuring 2.6 cm consistent with either a dominant follicle or simple cyst.   Free Fluid: None.   Impression:   Endometrioma on the right is slightly increased in size compared to most recent previous, though remains smaller than seen in March 2020.  No new abnormality.   Endometrioma on the left is decreased in size compared to previous studies.  No new abnormality.     No visits with results within 3 Month(s) from this visit.   Latest known visit with results is:   Admission on 01/16/2020, Discharged on 01/16/2020   Component Date Value Ref Range Status    POC Preg Test, Ur 01/16/2020 Negative  Negative Final     Acceptable 01/16/2020 Yes   Final       Past Medical History:   Diagnosis Date    Anemia      Past Surgical History:   Procedure Laterality Date    ARTHROSCOPIC DEBRIDEMENT OF SHOULDER Right 1/16/2020    Procedure: PARTIAL THICKNESS CUFF DEBRIDEMENT, SHOULDER, ARTHROSCOPIC;  Surgeon: Charmaine Barr MD;  Location: [facility];  Service: Orthopedics;  Laterality: Right;    ARTHROSCOPY OF SHOULDER WITH DECOMPRESSION OF SUBACROMIAL SPACE Right 1/16/2020    Procedure:  ARTHROSCOPY, SHOULDER, WITH SUBACROMIAL SPACE DECOMPRESSION EXTENSIVE DEBRIDMENT;  Surgeon: Charmaine Barr MD;  Location: [facility];  Service: Orthopedics;  Laterality: Right;    DILATION AND CURETTAGE OF UTERUS      AB    FIXATION OF TENDON Right 1/16/2020    Procedure: FIXATION, TENDON, subpectoral bicep tenodesis;  Surgeon: Charmaine Barr MD;  Location: [facility];  Service: Orthopedics;  Laterality: Right;  FIXATION, TENDON, bicep tenodesis    VAGINAL DELIVERY  2004, 2006, 2009    Boy/Boy/Girl     WISDOM TOOTH EXTRACTION       Family History   Problem Relation Age of Onset    Hyperthyroidism Mother     No Known Problems Brother     No Known Problems Brother     No Known Problems Brother     Amblyopia Neg Hx     Blindness Neg Hx     Cancer Neg Hx     Diabetes Neg Hx     Cataracts Neg Hx     Glaucoma Neg Hx     Hypertension Neg Hx     Macular degeneration Neg Hx     Retinal detachment Neg Hx     Strabismus Neg Hx     Stroke Neg Hx     Thyroid disease Neg Hx     Celiac disease Neg Hx     Colon cancer Neg Hx     Crohn's disease Neg Hx     Breast cancer Neg Hx     Ovarian cancer Neg Hx      Social History     Socioeconomic History    Marital status:      Spouse name: Not on file    Number of children: Not on file    Years of education: Not on file    Highest education level: Not on file   Occupational History    Occupation:    Social Needs    Financial resource strain: Not on file    Food insecurity     Worry: Not on file     Inability: Not on file    Transportation needs     Medical: Not on file     Non-medical: Not on file   Tobacco Use    Smoking status: Never Smoker    Smokeless tobacco: Never Used   Substance and Sexual Activity    Alcohol use: Yes     Alcohol/week: 0.0 standard drinks     Comment: socially    Drug use: No    Sexual activity: Yes     Partners: Male     Birth control/protection: Partner-Vasectomy     Comment: :     Lifestyle    Physical activity  "    Days per week: 3 days     Minutes per session: 40 min    Stress: Rather much   Relationships    Social connections     Talks on phone: Not on file     Gets together: Not on file     Attends Zoroastrian service: Not on file     Active member of club or organization: Not on file     Attends meetings of clubs or organizations: Not on file     Relationship status: Not on file   Other Topics Concern    Not on file   Social History Narrative    Originally from [place], [place]    Moved to [facility] in 2011    Living in [facility] with family and         Getting reg exercise      Review of patient's allergies indicates:  No Known Allergies  Current Outpatient Medications on File Prior to Visit   Medication Sig Dispense Refill    brimonidine (LUMIFY) 0.025 % Drop Place 1 drop into both eyes as needed. 7.5 mL 11    ferrous sulfate (FEOSOL) 325 mg (65 mg iron) Tab tablet Take 325 mg by mouth daily with breakfast.      progesterone (PROMETRIUM) 200 MG capsule Take 1 capsule (200 mg total) by mouth once daily. 30 capsule 11     No current facility-administered medications on file prior to visit.        Review of Systems:  General: No fever, chills, fatigue or weight loss.  Chest: No chest pain, shortness of breath, or palpitations.  Breast: No pain, masses, or nipple discharge.  Vulva: No pain, lesions, or itching.  Vagina: No relaxation, pain, itching, discharge, or lesions.  Abdomen: No pain, nausea, vomiting, diarrhea, or constipation.  Urinary: No incontinence, nocturia, frequency, or dysuria.  Extremities:  No leg cramps, edema, or calf pain.  Neurologic: No headaches, dizziness, or visual changes.    Vitals:    10/06/20 1410   Weight: 57.6 kg (127 lb 1.5 oz)   Height: 5' 2" (1.575 m)   PainSc: 0-No pain     Body mass index is 23.25 kg/m².    Assessment and Plan:  Endometrioma of ovary  -     US Pelvis Comp with Transvag NON-OB (xpd; Future; Expected date: 10/06/2020        Options discussed.  Can stay on progesterone 200 mg " PO or if wants to be aggressive increase to 300 mg.  She would rather change to RDT due to bloating.  Her uncle is pharmacist in [place].  She will find out if he can make her progesterone  mg.\  Repeat U/S in 6 months  I spent 15 minutes face to face with the patient today.

## 2020-12-14 ENCOUNTER — OFFICE VISIT (OUTPATIENT)
Dept: OPTOMETRY | Facility: CLINIC | Age: 45
End: 2020-12-14
Payer: COMMERCIAL

## 2020-12-14 DIAGNOSIS — H52.13 MYOPIA OF BOTH EYES: Primary | ICD-10-CM

## 2020-12-14 DIAGNOSIS — Z46.0 FITTING AND ADJUSTMENT OF SPECTACLES AND CONTACT LENSES: Primary | ICD-10-CM

## 2020-12-14 PROCEDURE — 99999 PR PBB SHADOW E&M-EST. PATIENT-LVL II: ICD-10-PCS | Mod: PBBFAC,,, | Performed by: OPTOMETRIST

## 2020-12-14 PROCEDURE — 99999 PR PBB SHADOW E&M-EST. PATIENT-LVL II: CPT | Mod: PBBFAC,,, | Performed by: OPTOMETRIST

## 2020-12-14 PROCEDURE — 92015 PR REFRACTION: ICD-10-PCS | Mod: S$GLB,,, | Performed by: OPTOMETRIST

## 2020-12-14 PROCEDURE — 92014 PR EYE EXAM, EST PATIENT,COMPREHESV: ICD-10-PCS | Mod: S$GLB,,, | Performed by: OPTOMETRIST

## 2020-12-14 PROCEDURE — 92015 DETERMINE REFRACTIVE STATE: CPT | Mod: S$GLB,,, | Performed by: OPTOMETRIST

## 2020-12-14 PROCEDURE — 92310 PR CONTACT LENS FITTING (NO CHANGE): ICD-10-PCS | Mod: CSM,,, | Performed by: OPTOMETRIST

## 2020-12-14 PROCEDURE — 92310 CONTACT LENS FITTING OU: CPT | Mod: CSM,,, | Performed by: OPTOMETRIST

## 2020-12-14 PROCEDURE — 92014 COMPRE OPH EXAM EST PT 1/>: CPT | Mod: S$GLB,,, | Performed by: OPTOMETRIST

## 2020-12-14 NOTE — Clinical Note
Hello!  Please dispense trials of  Acuvue Marilu (8.4/14.0) -1.25 D OU.  Call patient when ready. Thanks!!

## 2020-12-14 NOTE — PROGRESS NOTES
HPI     Adrienne Andrew is a 45 y.o. female who returns  for continued eye   care. Her last exam with me was on 12/09/2019.  She has bilateral myopia   for which she wears  Acuvue Marilu monthly replacement contact lenses. She   reports that comfort with the lenses is great. She explains, however, that   her night vision is fuzzy, even when wearing the contact lenses.    (+)blurred vision - at night (driving)  (--)Headaches  (--)diplopia  (--)flashes  (--)floaters  (--)pain  (--)Itching  (--)tearing  (--)burning  (--)Dryness  (--) OTC Drops  (--)Photophobia      Last edited by Jung Fuentes, OD on 12/14/2020  1:21 PM. (History)        Review of Systems   Constitutional: Negative for chills, fever and malaise/fatigue.   HENT: Negative for congestion and hearing loss.    Eyes: Positive for blurred vision (night driving only). Negative for double vision, photophobia, pain, discharge and redness.   Respiratory: Negative.    Cardiovascular: Negative.    Gastrointestinal: Negative.    Genitourinary: Negative.    Musculoskeletal: Negative.    Skin: Negative.    Neurological: Negative for seizures.   Endo/Heme/Allergies: Negative for environmental allergies.   Psychiatric/Behavioral: Negative.        For exam results, see encounter report    Assessment /Plan     1. Myopia of both eyes  - same specs ok  - Difficulty with night driving, so will trial increase of 0.25D in contact lenses  Trials dispensed as below for 1 month replacement   Advised against overnight wear, risks of overnight wear explained;    Optive artificial tears for comfort prn;     2. Good ocular Health OU  - DFE due next year    Patient education; [facility]; otherwise,  RTC in 1 year with DFE; Ok to instill 1% Tropicamide after (normal) baseline workup, sooner as needed     -------------------Addendum 12/29/20----------------------------  CLRx per below for 1 month disposal/replacement    -Advised against overnight wear, risks of overnight  wear explained;     -Optive artificial tears for comfort prn;    -Optifree Puremoist or Clear care solutions recommended    Contact Lens Prescription (12/14/2020)        Brand Base Curve Diameter Sphere    Right Acuvue Marilu 8.4 14.0 -1.25    Left Acuvue Marilu 8.4 14.0 -1.25    Expiration Date: 12/15/2021    Replacement: Monthly    Solutions: OptiFree PureMoist    Wearing Schedule: Daily wear         RTC in 1 year with DFE; Ok to instill 1% Tropicamide after (normal) baseline workup, sooner as needed

## 2020-12-29 ENCOUNTER — PATIENT MESSAGE (OUTPATIENT)
Dept: OPTOMETRY | Facility: CLINIC | Age: 45
End: 2020-12-29

## 2021-01-04 ENCOUNTER — PATIENT MESSAGE (OUTPATIENT)
Dept: ADMINISTRATIVE | Facility: HOSPITAL | Age: 46
End: 2021-01-04

## 2021-01-22 ENCOUNTER — PATIENT MESSAGE (OUTPATIENT)
Dept: OBSTETRICS AND GYNECOLOGY | Facility: CLINIC | Age: 46
End: 2021-01-22

## 2021-01-25 ENCOUNTER — TELEPHONE (OUTPATIENT)
Dept: UROGYNECOLOGY | Facility: CLINIC | Age: 46
End: 2021-01-25

## 2021-02-10 ENCOUNTER — PATIENT MESSAGE (OUTPATIENT)
Dept: INTERNAL MEDICINE | Facility: CLINIC | Age: 46
End: 2021-02-10

## 2021-02-10 ENCOUNTER — OFFICE VISIT (OUTPATIENT)
Dept: OBSTETRICS AND GYNECOLOGY | Facility: CLINIC | Age: 46
End: 2021-02-10
Attending: OBSTETRICS & GYNECOLOGY
Payer: COMMERCIAL

## 2021-02-10 VITALS
SYSTOLIC BLOOD PRESSURE: 122 MMHG | DIASTOLIC BLOOD PRESSURE: 64 MMHG | BODY MASS INDEX: 22.88 KG/M2 | WEIGHT: 125.13 LBS

## 2021-02-10 DIAGNOSIS — R10.2 PELVIC PAIN IN FEMALE: Primary | ICD-10-CM

## 2021-02-10 DIAGNOSIS — N93.8 DUB (DYSFUNCTIONAL UTERINE BLEEDING): ICD-10-CM

## 2021-02-10 LAB
BILIRUB SERPL-MCNC: ABNORMAL MG/DL
BLOOD URINE, POC: ABNORMAL
CLARITY, POC UA: CLEAR
COLOR, POC UA: YELLOW
GLUCOSE UR QL STRIP: NORMAL
KETONES UR QL STRIP: ABNORMAL
LEUKOCYTE ESTERASE URINE, POC: ABNORMAL
NITRITE, POC UA: ABNORMAL
PH, POC UA: 5
PROTEIN, POC: ABNORMAL
SPECIFIC GRAVITY, POC UA: 1.01
UROBILINOGEN, POC UA: NORMAL

## 2021-02-10 PROCEDURE — 87086 URINE CULTURE/COLONY COUNT: CPT

## 2021-02-10 PROCEDURE — 81002 URINALYSIS NONAUTO W/O SCOPE: CPT | Mod: S$GLB,,, | Performed by: OBSTETRICS & GYNECOLOGY

## 2021-02-10 PROCEDURE — 1126F AMNT PAIN NOTED NONE PRSNT: CPT | Mod: S$GLB,,, | Performed by: OBSTETRICS & GYNECOLOGY

## 2021-02-10 PROCEDURE — 99213 PR OFFICE/OUTPT VISIT, EST, LEVL III, 20-29 MIN: ICD-10-PCS | Mod: 25,S$GLB,, | Performed by: OBSTETRICS & GYNECOLOGY

## 2021-02-10 PROCEDURE — 99213 OFFICE O/P EST LOW 20 MIN: CPT | Mod: 25,S$GLB,, | Performed by: OBSTETRICS & GYNECOLOGY

## 2021-02-10 PROCEDURE — 99999 PR PBB SHADOW E&M-EST. PATIENT-LVL II: CPT | Mod: PBBFAC,,, | Performed by: OBSTETRICS & GYNECOLOGY

## 2021-02-10 PROCEDURE — 3008F PR BODY MASS INDEX (BMI) DOCUMENTED: ICD-10-PCS | Mod: CPTII,S$GLB,, | Performed by: OBSTETRICS & GYNECOLOGY

## 2021-02-10 PROCEDURE — 3008F BODY MASS INDEX DOCD: CPT | Mod: CPTII,S$GLB,, | Performed by: OBSTETRICS & GYNECOLOGY

## 2021-02-10 PROCEDURE — 81002 POCT URINE DIPSTICK WITHOUT MICROSCOPE: ICD-10-PCS | Mod: S$GLB,,, | Performed by: OBSTETRICS & GYNECOLOGY

## 2021-02-10 PROCEDURE — 1126F PR PAIN SEVERITY QUANTIFIED, NO PAIN PRESENT: ICD-10-PCS | Mod: S$GLB,,, | Performed by: OBSTETRICS & GYNECOLOGY

## 2021-02-10 PROCEDURE — 99999 PR PBB SHADOW E&M-EST. PATIENT-LVL II: ICD-10-PCS | Mod: PBBFAC,,, | Performed by: OBSTETRICS & GYNECOLOGY

## 2021-02-11 ENCOUNTER — TELEPHONE (OUTPATIENT)
Dept: INTERNAL MEDICINE | Facility: CLINIC | Age: 46
End: 2021-02-11

## 2021-02-11 DIAGNOSIS — Z12.31 SCREENING MAMMOGRAM, ENCOUNTER FOR: Primary | ICD-10-CM

## 2021-02-12 ENCOUNTER — HOSPITAL ENCOUNTER (OUTPATIENT)
Dept: RADIOLOGY | Facility: HOSPITAL | Age: 46
Discharge: HOME | End: 2021-02-12
Attending: INTERNAL MEDICINE
Payer: COMMERCIAL

## 2021-02-12 DIAGNOSIS — Z12.31 SCREENING MAMMOGRAM, ENCOUNTER FOR: ICD-10-CM

## 2021-02-12 LAB — BACTERIA UR CULT: NORMAL

## 2021-02-12 PROCEDURE — 77067 SCR MAMMO BI INCL CAD: CPT | Mod: TC

## 2021-02-12 PROCEDURE — 77067 MAMMO DIGITAL SCREENING BILAT WITH TOMO: ICD-10-PCS | Mod: 26,,, | Performed by: RADIOLOGY

## 2021-02-12 PROCEDURE — 77063 BREAST TOMOSYNTHESIS BI: CPT | Mod: 26,,, | Performed by: RADIOLOGY

## 2021-02-12 PROCEDURE — 77067 SCR MAMMO BI INCL CAD: CPT | Mod: 26,,, | Performed by: RADIOLOGY

## 2021-02-12 PROCEDURE — 77063 MAMMO DIGITAL SCREENING BILAT WITH TOMO: ICD-10-PCS | Mod: 26,,, | Performed by: RADIOLOGY

## 2021-03-08 ENCOUNTER — OFFICE VISIT (OUTPATIENT)
Dept: OBSTETRICS AND GYNECOLOGY | Facility: CLINIC | Age: 46
End: 2021-03-08
Attending: OBSTETRICS & GYNECOLOGY
Payer: COMMERCIAL

## 2021-03-08 VITALS
DIASTOLIC BLOOD PRESSURE: 78 MMHG | SYSTOLIC BLOOD PRESSURE: 132 MMHG | HEIGHT: 62 IN | BODY MASS INDEX: 23.06 KG/M2 | WEIGHT: 125.31 LBS

## 2021-03-08 DIAGNOSIS — N80.129 ENDOMETRIOMA OF OVARY: ICD-10-CM

## 2021-03-08 DIAGNOSIS — N93.8 DUB (DYSFUNCTIONAL UTERINE BLEEDING): Primary | ICD-10-CM

## 2021-03-08 DIAGNOSIS — D21.9 FIBROID: ICD-10-CM

## 2021-03-08 PROCEDURE — 3008F BODY MASS INDEX DOCD: CPT | Mod: CPTII,S$GLB,, | Performed by: OBSTETRICS & GYNECOLOGY

## 2021-03-08 PROCEDURE — 99213 PR OFFICE/OUTPT VISIT, EST, LEVL III, 20-29 MIN: ICD-10-PCS | Mod: S$GLB,,, | Performed by: OBSTETRICS & GYNECOLOGY

## 2021-03-08 PROCEDURE — 3008F PR BODY MASS INDEX (BMI) DOCUMENTED: ICD-10-PCS | Mod: CPTII,S$GLB,, | Performed by: OBSTETRICS & GYNECOLOGY

## 2021-03-08 PROCEDURE — 99213 OFFICE O/P EST LOW 20 MIN: CPT | Mod: S$GLB,,, | Performed by: OBSTETRICS & GYNECOLOGY

## 2021-03-08 PROCEDURE — 99999 PR PBB SHADOW E&M-EST. PATIENT-LVL III: ICD-10-PCS | Mod: PBBFAC,,, | Performed by: OBSTETRICS & GYNECOLOGY

## 2021-03-08 PROCEDURE — 1126F PR PAIN SEVERITY QUANTIFIED, NO PAIN PRESENT: ICD-10-PCS | Mod: S$GLB,,, | Performed by: OBSTETRICS & GYNECOLOGY

## 2021-03-08 PROCEDURE — 1126F AMNT PAIN NOTED NONE PRSNT: CPT | Mod: S$GLB,,, | Performed by: OBSTETRICS & GYNECOLOGY

## 2021-03-08 PROCEDURE — 99999 PR PBB SHADOW E&M-EST. PATIENT-LVL III: CPT | Mod: PBBFAC,,, | Performed by: OBSTETRICS & GYNECOLOGY

## 2021-03-10 ENCOUNTER — LAB VISIT (OUTPATIENT)
Dept: LAB | Facility: OTHER | Age: 46
End: 2021-03-10
Attending: OBSTETRICS & GYNECOLOGY
Payer: COMMERCIAL

## 2021-03-10 ENCOUNTER — OFFICE VISIT (OUTPATIENT)
Dept: UROGYNECOLOGY | Facility: CLINIC | Age: 46
End: 2021-03-10
Payer: COMMERCIAL

## 2021-03-10 VITALS
BODY MASS INDEX: 22.41 KG/M2 | SYSTOLIC BLOOD PRESSURE: 133 MMHG | HEART RATE: 90 BPM | HEIGHT: 62 IN | DIASTOLIC BLOOD PRESSURE: 75 MMHG | WEIGHT: 121.81 LBS

## 2021-03-10 DIAGNOSIS — R33.9 INCOMPLETE BLADDER EMPTYING: ICD-10-CM

## 2021-03-10 DIAGNOSIS — K59.09 CHRONIC CONSTIPATION: ICD-10-CM

## 2021-03-10 DIAGNOSIS — N81.6 RECTOCELE, FEMALE: ICD-10-CM

## 2021-03-10 DIAGNOSIS — N32.89 BLADDER IRRITABILITY: ICD-10-CM

## 2021-03-10 DIAGNOSIS — N81.11 CYSTOCELE, MIDLINE: ICD-10-CM

## 2021-03-10 DIAGNOSIS — R33.9 INCOMPLETE BLADDER EMPTYING: Primary | ICD-10-CM

## 2021-03-10 DIAGNOSIS — M79.18 MYALGIA OF PELVIC FLOOR: ICD-10-CM

## 2021-03-10 DIAGNOSIS — R10.2 PELVIC PRESSURE IN FEMALE: ICD-10-CM

## 2021-03-10 LAB
ANION GAP SERPL CALC-SCNC: 11 MMOL/L (ref 8–16)
BACTERIA #/AREA URNS AUTO: NORMAL /HPF
BUN SERPL-MCNC: 9 MG/DL (ref 6–20)
CALCIUM SERPL-MCNC: 9.3 MG/DL (ref 8.7–10.5)
CHLORIDE SERPL-SCNC: 105 MMOL/L (ref 95–110)
CO2 SERPL-SCNC: 25 MMOL/L (ref 23–29)
CREAT SERPL-MCNC: 0.9 MG/DL (ref 0.5–1.4)
EST. GFR  (AFRICAN AMERICAN): >60 ML/MIN/1.73 M^2
EST. GFR  (NON AFRICAN AMERICAN): >60 ML/MIN/1.73 M^2
GLUCOSE SERPL-MCNC: 92 MG/DL (ref 70–110)
MICROSCOPIC COMMENT: NORMAL
POTASSIUM SERPL-SCNC: 4.6 MMOL/L (ref 3.5–5.1)
SODIUM SERPL-SCNC: 141 MMOL/L (ref 136–145)

## 2021-03-10 PROCEDURE — 3008F BODY MASS INDEX DOCD: CPT | Mod: CPTII,S$GLB,, | Performed by: OBSTETRICS & GYNECOLOGY

## 2021-03-10 PROCEDURE — 99999 PR PBB SHADOW E&M-EST. PATIENT-LVL IV: CPT | Mod: PBBFAC,,, | Performed by: OBSTETRICS & GYNECOLOGY

## 2021-03-10 PROCEDURE — 51701 INSERT BLADDER CATHETER: CPT | Mod: S$GLB,,, | Performed by: OBSTETRICS & GYNECOLOGY

## 2021-03-10 PROCEDURE — 80048 BASIC METABOLIC PNL TOTAL CA: CPT | Performed by: OBSTETRICS & GYNECOLOGY

## 2021-03-10 PROCEDURE — 51701 PR INSERTION OF NON-INDWELLING BLADDER CATHETERIZATION FOR RESIDUAL UR: ICD-10-PCS | Mod: S$GLB,,, | Performed by: OBSTETRICS & GYNECOLOGY

## 2021-03-10 PROCEDURE — 99999 PR PBB SHADOW E&M-EST. PATIENT-LVL IV: ICD-10-PCS | Mod: PBBFAC,,, | Performed by: OBSTETRICS & GYNECOLOGY

## 2021-03-10 PROCEDURE — 3008F PR BODY MASS INDEX (BMI) DOCUMENTED: ICD-10-PCS | Mod: CPTII,S$GLB,, | Performed by: OBSTETRICS & GYNECOLOGY

## 2021-03-10 PROCEDURE — 1125F PR PAIN SEVERITY QUANTIFIED, PAIN PRESENT: ICD-10-PCS | Mod: S$GLB,,, | Performed by: OBSTETRICS & GYNECOLOGY

## 2021-03-10 PROCEDURE — 81001 URINALYSIS AUTO W/SCOPE: CPT | Performed by: OBSTETRICS & GYNECOLOGY

## 2021-03-10 PROCEDURE — 87086 URINE CULTURE/COLONY COUNT: CPT | Performed by: OBSTETRICS & GYNECOLOGY

## 2021-03-10 PROCEDURE — 1125F AMNT PAIN NOTED PAIN PRSNT: CPT | Mod: S$GLB,,, | Performed by: OBSTETRICS & GYNECOLOGY

## 2021-03-10 PROCEDURE — 99213 OFFICE O/P EST LOW 20 MIN: CPT | Mod: 25,S$GLB,, | Performed by: OBSTETRICS & GYNECOLOGY

## 2021-03-10 PROCEDURE — 99213 PR OFFICE/OUTPT VISIT, EST, LEVL III, 20-29 MIN: ICD-10-PCS | Mod: 25,S$GLB,, | Performed by: OBSTETRICS & GYNECOLOGY

## 2021-03-10 PROCEDURE — 36415 COLL VENOUS BLD VENIPUNCTURE: CPT | Performed by: OBSTETRICS & GYNECOLOGY

## 2021-03-10 RX ORDER — METHENAMINE, SODIUM PHOSPHATE, MONOBASIC, MONOHYDRATE, PHENYL SALICYLATE, METHYLENE BLUE, AND HYOSCYAMINE SULFATE 40.8; 36.2; .12; 10.8; 81.6 MG/1; MG/1; MG/1; MG/1; MG/1
1 TABLET ORAL 4 TIMES DAILY PRN
Qty: 30 TABLET | Refills: 11 | Status: SHIPPED | OUTPATIENT
Start: 2021-03-10 | End: 2021-03-10

## 2021-03-10 RX ORDER — FLAVOXATE HYDROCHLORIDE 100 MG/1
100 TABLET ORAL 3 TIMES DAILY PRN
Qty: 30 TABLET | Refills: 11 | Status: SHIPPED | OUTPATIENT
Start: 2021-03-10 | End: 2021-03-12

## 2021-03-11 ENCOUNTER — PATIENT MESSAGE (OUTPATIENT)
Dept: UROGYNECOLOGY | Facility: CLINIC | Age: 46
End: 2021-03-11

## 2021-03-12 ENCOUNTER — TELEPHONE (OUTPATIENT)
Dept: UROGYNECOLOGY | Facility: CLINIC | Age: 46
End: 2021-03-12

## 2021-03-12 ENCOUNTER — OFFICE VISIT (OUTPATIENT)
Dept: UROGYNECOLOGY | Facility: CLINIC | Age: 46
End: 2021-03-12
Payer: COMMERCIAL

## 2021-03-12 ENCOUNTER — PATIENT MESSAGE (OUTPATIENT)
Dept: UROGYNECOLOGY | Facility: CLINIC | Age: 46
End: 2021-03-12

## 2021-03-12 ENCOUNTER — HOSPITAL ENCOUNTER (OUTPATIENT)
Dept: RADIOLOGY | Facility: OTHER | Age: 46
Discharge: HOME | End: 2021-03-12
Attending: OBSTETRICS & GYNECOLOGY
Payer: COMMERCIAL

## 2021-03-12 VITALS
HEIGHT: 62 IN | SYSTOLIC BLOOD PRESSURE: 120 MMHG | DIASTOLIC BLOOD PRESSURE: 80 MMHG | WEIGHT: 119.69 LBS | BODY MASS INDEX: 22.03 KG/M2

## 2021-03-12 DIAGNOSIS — R10.2 PELVIC PRESSURE IN FEMALE: ICD-10-CM

## 2021-03-12 DIAGNOSIS — N81.6 RECTOCELE, FEMALE: ICD-10-CM

## 2021-03-12 DIAGNOSIS — R33.9 INCOMPLETE BLADDER EMPTYING: ICD-10-CM

## 2021-03-12 DIAGNOSIS — R39.15 URINARY URGENCY: Primary | ICD-10-CM

## 2021-03-12 DIAGNOSIS — K59.09 CHRONIC CONSTIPATION: ICD-10-CM

## 2021-03-12 DIAGNOSIS — N81.11 CYSTOCELE, MIDLINE: ICD-10-CM

## 2021-03-12 DIAGNOSIS — M79.18 MYALGIA OF PELVIC FLOOR: ICD-10-CM

## 2021-03-12 DIAGNOSIS — G47.00 INSOMNIA, UNSPECIFIED TYPE: ICD-10-CM

## 2021-03-12 PROBLEM — N32.89 BLADDER IRRITABILITY: Status: ACTIVE | Noted: 2021-03-12

## 2021-03-12 LAB — BACTERIA UR CULT: NO GROWTH

## 2021-03-12 PROCEDURE — 1126F AMNT PAIN NOTED NONE PRSNT: CPT | Mod: S$GLB,,, | Performed by: NURSE PRACTITIONER

## 2021-03-12 PROCEDURE — 99999 PR PBB SHADOW E&M-EST. PATIENT-LVL III: ICD-10-PCS | Mod: PBBFAC,,, | Performed by: NURSE PRACTITIONER

## 2021-03-12 PROCEDURE — 51701 INSERT BLADDER CATHETER: CPT | Mod: S$GLB,,, | Performed by: NURSE PRACTITIONER

## 2021-03-12 PROCEDURE — 99214 PR OFFICE/OUTPT VISIT, EST, LEVL IV, 30-39 MIN: ICD-10-PCS | Mod: 25,S$GLB,, | Performed by: NURSE PRACTITIONER

## 2021-03-12 PROCEDURE — 99999 PR PBB SHADOW E&M-EST. PATIENT-LVL III: CPT | Mod: PBBFAC,,, | Performed by: NURSE PRACTITIONER

## 2021-03-12 PROCEDURE — 1126F PR PAIN SEVERITY QUANTIFIED, NO PAIN PRESENT: ICD-10-PCS | Mod: S$GLB,,, | Performed by: NURSE PRACTITIONER

## 2021-03-12 PROCEDURE — 3008F BODY MASS INDEX DOCD: CPT | Mod: CPTII,S$GLB,, | Performed by: NURSE PRACTITIONER

## 2021-03-12 PROCEDURE — 3008F PR BODY MASS INDEX (BMI) DOCUMENTED: ICD-10-PCS | Mod: CPTII,S$GLB,, | Performed by: NURSE PRACTITIONER

## 2021-03-12 PROCEDURE — 51701 PR INSERTION OF NON-INDWELLING BLADDER CATHETERIZATION FOR RESIDUAL UR: ICD-10-PCS | Mod: S$GLB,,, | Performed by: NURSE PRACTITIONER

## 2021-03-12 PROCEDURE — 76770 US EXAM ABDO BACK WALL COMP: CPT | Mod: 26,,, | Performed by: RADIOLOGY

## 2021-03-12 PROCEDURE — 76770 US EXAM ABDO BACK WALL COMP: CPT | Mod: TC

## 2021-03-12 PROCEDURE — 76770 US RETROPERITONEAL COMPLETE: ICD-10-PCS | Mod: 26,,, | Performed by: RADIOLOGY

## 2021-03-12 PROCEDURE — 99214 OFFICE O/P EST MOD 30 MIN: CPT | Mod: 25,S$GLB,, | Performed by: NURSE PRACTITIONER

## 2021-03-12 RX ORDER — AMITRIPTYLINE HYDROCHLORIDE 10 MG/1
10 TABLET, FILM COATED ORAL NIGHTLY PRN
Qty: 30 TABLET | Refills: 1 | Status: SHIPPED | OUTPATIENT
Start: 2021-03-12 | End: 2021-09-20 | Stop reason: SDUPTHER

## 2021-03-15 ENCOUNTER — PATIENT MESSAGE (OUTPATIENT)
Dept: UROGYNECOLOGY | Facility: CLINIC | Age: 46
End: 2021-03-15

## 2021-03-15 DIAGNOSIS — R39.15 URINARY URGENCY: ICD-10-CM

## 2021-03-15 DIAGNOSIS — R39.15 URINARY URGENCY: Primary | ICD-10-CM

## 2021-03-18 ENCOUNTER — PATIENT MESSAGE (OUTPATIENT)
Dept: UROGYNECOLOGY | Facility: CLINIC | Age: 46
End: 2021-03-18

## 2021-03-19 ENCOUNTER — PATIENT MESSAGE (OUTPATIENT)
Dept: UROGYNECOLOGY | Facility: CLINIC | Age: 46
End: 2021-03-19

## 2021-03-26 ENCOUNTER — PATIENT MESSAGE (OUTPATIENT)
Dept: UROGYNECOLOGY | Facility: CLINIC | Age: 46
End: 2021-03-26

## 2021-04-02 ENCOUNTER — NURSE TRIAGE (OUTPATIENT)
Dept: ADMINISTRATIVE | Facility: CLINIC | Age: 46
End: 2021-04-02

## 2021-04-02 ENCOUNTER — PATIENT MESSAGE (OUTPATIENT)
Dept: UROGYNECOLOGY | Facility: CLINIC | Age: 46
End: 2021-04-02

## 2021-04-02 RX ORDER — FLAVOXATE HYDROCHLORIDE 100 MG/1
100 TABLET ORAL 3 TIMES DAILY PRN
Qty: 30 TABLET | Refills: 0 | Status: SHIPPED | OUTPATIENT
Start: 2021-04-02 | End: 2021-04-05 | Stop reason: SDUPTHER

## 2021-04-03 RX ORDER — FLAVOXATE HYDROCHLORIDE 100 MG/1
100 TABLET ORAL 3 TIMES DAILY PRN
Qty: 30 TABLET | Refills: 0 | Status: CANCELLED | OUTPATIENT
Start: 2021-04-03 | End: 2022-04-03

## 2021-04-05 ENCOUNTER — PATIENT MESSAGE (OUTPATIENT)
Dept: ADMINISTRATIVE | Facility: HOSPITAL | Age: 46
End: 2021-04-05

## 2021-04-05 ENCOUNTER — OFFICE VISIT (OUTPATIENT)
Dept: OBSTETRICS AND GYNECOLOGY | Facility: CLINIC | Age: 46
End: 2021-04-05
Attending: OBSTETRICS & GYNECOLOGY
Payer: COMMERCIAL

## 2021-04-05 VITALS
HEIGHT: 62 IN | SYSTOLIC BLOOD PRESSURE: 112 MMHG | WEIGHT: 121.38 LBS | BODY MASS INDEX: 22.34 KG/M2 | DIASTOLIC BLOOD PRESSURE: 74 MMHG

## 2021-04-05 DIAGNOSIS — Z01.812 PRE-PROCEDURE LAB EXAM: ICD-10-CM

## 2021-04-05 DIAGNOSIS — Z01.419 ENCOUNTER FOR GYNECOLOGICAL EXAMINATION: Primary | ICD-10-CM

## 2021-04-05 DIAGNOSIS — Z12.4 SCREENING FOR MALIGNANT NEOPLASM OF THE CERVIX: ICD-10-CM

## 2021-04-05 DIAGNOSIS — N94.6 DYSMENORRHEA: ICD-10-CM

## 2021-04-05 DIAGNOSIS — N93.8 DYSFUNCTIONAL UTERINE BLEEDING: ICD-10-CM

## 2021-04-05 DIAGNOSIS — Z12.31 VISIT FOR SCREENING MAMMOGRAM: ICD-10-CM

## 2021-04-05 DIAGNOSIS — Z11.51 SCREENING FOR HPV (HUMAN PAPILLOMAVIRUS): ICD-10-CM

## 2021-04-05 DIAGNOSIS — N92.0 MENORRHAGIA WITH REGULAR CYCLE: ICD-10-CM

## 2021-04-05 DIAGNOSIS — Z23 NEED FOR HPV VACCINATION: ICD-10-CM

## 2021-04-05 LAB
B-HCG UR QL: NEGATIVE
CTP QC/QA: YES

## 2021-04-05 PROCEDURE — 87624 HPV HI-RISK TYP POOLED RSLT: CPT | Performed by: OBSTETRICS & GYNECOLOGY

## 2021-04-05 PROCEDURE — 88305 TISSUE EXAM BY PATHOLOGIST: CPT | Performed by: PATHOLOGY

## 2021-04-05 PROCEDURE — 99396 PR PREVENTIVE VISIT,EST,40-64: ICD-10-PCS | Mod: 25,S$GLB,, | Performed by: OBSTETRICS & GYNECOLOGY

## 2021-04-05 PROCEDURE — 99999 PR PBB SHADOW E&M-EST. PATIENT-LVL III: CPT | Mod: PBBFAC,,, | Performed by: OBSTETRICS & GYNECOLOGY

## 2021-04-05 PROCEDURE — 99999 PR PBB SHADOW E&M-EST. PATIENT-LVL III: ICD-10-PCS | Mod: PBBFAC,,, | Performed by: OBSTETRICS & GYNECOLOGY

## 2021-04-05 PROCEDURE — 3008F PR BODY MASS INDEX (BMI) DOCUMENTED: ICD-10-PCS | Mod: CPTII,S$GLB,, | Performed by: OBSTETRICS & GYNECOLOGY

## 2021-04-05 PROCEDURE — 88175 CYTOPATH C/V AUTO FLUID REDO: CPT | Performed by: OBSTETRICS & GYNECOLOGY

## 2021-04-05 PROCEDURE — 99396 PREV VISIT EST AGE 40-64: CPT | Mod: 25,S$GLB,, | Performed by: OBSTETRICS & GYNECOLOGY

## 2021-04-05 PROCEDURE — 1126F PR PAIN SEVERITY QUANTIFIED, NO PAIN PRESENT: ICD-10-PCS | Mod: S$GLB,,, | Performed by: OBSTETRICS & GYNECOLOGY

## 2021-04-05 PROCEDURE — 88305 TISSUE EXAM BY PATHOLOGIST: CPT | Mod: 26,,, | Performed by: PATHOLOGY

## 2021-04-05 PROCEDURE — 88305 TISSUE EXAM BY PATHOLOGIST: ICD-10-PCS | Mod: 26,,, | Performed by: PATHOLOGY

## 2021-04-05 PROCEDURE — 81025 URINE PREGNANCY TEST: CPT | Mod: S$GLB,,, | Performed by: OBSTETRICS & GYNECOLOGY

## 2021-04-05 PROCEDURE — 58100 BIOPSY OF UTERUS LINING: CPT | Mod: S$GLB,,, | Performed by: OBSTETRICS & GYNECOLOGY

## 2021-04-05 PROCEDURE — 3008F BODY MASS INDEX DOCD: CPT | Mod: CPTII,S$GLB,, | Performed by: OBSTETRICS & GYNECOLOGY

## 2021-04-05 PROCEDURE — 81025 POCT URINE PREGNANCY: ICD-10-PCS | Mod: S$GLB,,, | Performed by: OBSTETRICS & GYNECOLOGY

## 2021-04-05 PROCEDURE — 58100 PR BIOPSY OF UTERUS LINING: ICD-10-PCS | Mod: S$GLB,,, | Performed by: OBSTETRICS & GYNECOLOGY

## 2021-04-05 PROCEDURE — 1126F AMNT PAIN NOTED NONE PRSNT: CPT | Mod: S$GLB,,, | Performed by: OBSTETRICS & GYNECOLOGY

## 2021-04-05 RX ORDER — FLAVOXATE HYDROCHLORIDE 100 MG/1
100 TABLET ORAL 3 TIMES DAILY PRN
Qty: 30 TABLET | Refills: 11 | Status: SHIPPED | OUTPATIENT
Start: 2021-04-05 | End: 2021-06-14

## 2021-04-09 LAB
FINAL PATHOLOGIC DIAGNOSIS: NORMAL
GROSS: NORMAL

## 2021-04-10 LAB
CLINICAL INFO: NORMAL
CYTO CVX: NORMAL
CYTOLOGIST CVX/VAG CYTO: NORMAL
CYTOLOGY CMNT CVX/VAG CYTO-IMP: NORMAL
CYTOLOGY PAP THIN PREP EXPLANATION: NORMAL
DATE OF PREVIOUS PAP: NORMAL
DATE PREVIOUS BX: NO
HPV I/H RISK 4 DNA CVX QL NAA+PROBE: NOT DETECTED
LMP START DATE: NORMAL
SPECIMEN SOURCE CVX/VAG CYTO: NORMAL
STAT OF ADQ CVX/VAG CYTO-IMP: NORMAL

## 2021-04-13 ENCOUNTER — PATIENT MESSAGE (OUTPATIENT)
Dept: OBSTETRICS AND GYNECOLOGY | Facility: CLINIC | Age: 46
End: 2021-04-13

## 2021-04-19 ENCOUNTER — PATIENT MESSAGE (OUTPATIENT)
Dept: OBSTETRICS AND GYNECOLOGY | Facility: CLINIC | Age: 46
End: 2021-04-19

## 2021-04-23 ENCOUNTER — TELEPHONE (OUTPATIENT)
Dept: OBSTETRICS AND GYNECOLOGY | Facility: CLINIC | Age: 46
End: 2021-04-23
Payer: COMMERCIAL

## 2021-05-28 ENCOUNTER — TELEPHONE (OUTPATIENT)
Dept: OBSTETRICS AND GYNECOLOGY | Facility: CLINIC | Age: 46
End: 2021-05-28
Payer: COMMERCIAL

## 2021-05-28 DIAGNOSIS — N80.129 ENDOMETRIOMA: Primary | ICD-10-CM

## 2021-05-28 DIAGNOSIS — R10.2 PELVIC PRESSURE IN FEMALE: ICD-10-CM

## 2021-05-28 DIAGNOSIS — N94.6 DYSMENORRHEA: ICD-10-CM

## 2021-05-28 DIAGNOSIS — N92.0 MENORRHAGIA: ICD-10-CM

## 2021-06-01 ENCOUNTER — TELEPHONE (OUTPATIENT)
Dept: OBSTETRICS AND GYNECOLOGY | Facility: CLINIC | Age: 46
End: 2021-06-01

## 2021-06-01 DIAGNOSIS — Z01.818 PRE-PROCEDURAL EXAMINATION: Primary | ICD-10-CM

## 2021-06-14 ENCOUNTER — ANESTHESIA EVENT (OUTPATIENT)
Dept: SURGERY | Facility: OTHER | Age: 46
End: 2021-06-14
Payer: COMMERCIAL

## 2021-06-14 ENCOUNTER — OFFICE VISIT (OUTPATIENT)
Dept: OBSTETRICS AND GYNECOLOGY | Facility: CLINIC | Age: 46
End: 2021-06-14
Attending: OBSTETRICS & GYNECOLOGY
Payer: COMMERCIAL

## 2021-06-14 ENCOUNTER — HOSPITAL ENCOUNTER (OUTPATIENT)
Dept: PREADMISSION TESTING | Facility: OTHER | Age: 46
Discharge: HOME | End: 2021-06-14
Attending: OBSTETRICS & GYNECOLOGY
Payer: COMMERCIAL

## 2021-06-14 VITALS
DIASTOLIC BLOOD PRESSURE: 70 MMHG | HEIGHT: 62 IN | WEIGHT: 122 LBS | SYSTOLIC BLOOD PRESSURE: 120 MMHG | BODY MASS INDEX: 22.45 KG/M2

## 2021-06-14 VITALS
BODY MASS INDEX: 22.66 KG/M2 | TEMPERATURE: 98 F | HEART RATE: 71 BPM | HEIGHT: 61 IN | WEIGHT: 120 LBS | RESPIRATION RATE: 16 BRPM | OXYGEN SATURATION: 99 %

## 2021-06-14 DIAGNOSIS — N92.0 MENORRHAGIA WITH REGULAR CYCLE: ICD-10-CM

## 2021-06-14 DIAGNOSIS — R10.2 PELVIC PRESSURE IN FEMALE: ICD-10-CM

## 2021-06-14 DIAGNOSIS — N80.129 ENDOMETRIOMA OF OVARY: ICD-10-CM

## 2021-06-14 DIAGNOSIS — N80.129 ENDOMETRIOMA: Primary | ICD-10-CM

## 2021-06-14 DIAGNOSIS — N80.129 ENDOMETRIOMA: ICD-10-CM

## 2021-06-14 LAB
ABO + RH BLD: NORMAL
BASOPHILS # BLD AUTO: 0.05 K/UL (ref 0–0.2)
BASOPHILS NFR BLD: 0.7 % (ref 0–1.9)
BLD GP AB SCN CELLS X3 SERPL QL: NORMAL
DIFFERENTIAL METHOD: ABNORMAL
EOSINOPHIL # BLD AUTO: 0.1 K/UL (ref 0–0.5)
EOSINOPHIL NFR BLD: 1.6 % (ref 0–8)
ERYTHROCYTE [DISTWIDTH] IN BLOOD BY AUTOMATED COUNT: 13.4 % (ref 11.5–14.5)
HCT VFR BLD AUTO: 41.6 % (ref 37–48.5)
HGB BLD-MCNC: 13.8 G/DL (ref 12–16)
IMM GRANULOCYTES # BLD AUTO: 0.02 K/UL (ref 0–0.04)
IMM GRANULOCYTES NFR BLD AUTO: 0.3 % (ref 0–0.5)
LYMPHOCYTES # BLD AUTO: 1.3 K/UL (ref 1–4.8)
LYMPHOCYTES NFR BLD: 18 % (ref 18–48)
MCH RBC QN AUTO: 30.5 PG (ref 27–31)
MCHC RBC AUTO-ENTMCNC: 33.2 G/DL (ref 32–36)
MCV RBC AUTO: 92 FL (ref 82–98)
MONOCYTES # BLD AUTO: 0.4 K/UL (ref 0.3–1)
MONOCYTES NFR BLD: 5.8 % (ref 4–15)
NEUTROPHILS # BLD AUTO: 5.5 K/UL (ref 1.8–7.7)
NEUTROPHILS NFR BLD: 73.6 % (ref 38–73)
NRBC BLD-RTO: 0 /100 WBC
PLATELET # BLD AUTO: 276 K/UL (ref 150–450)
PMV BLD AUTO: 11.3 FL (ref 9.2–12.9)
RBC # BLD AUTO: 4.53 M/UL (ref 4–5.4)
WBC # BLD AUTO: 7.46 K/UL (ref 3.9–12.7)

## 2021-06-14 PROCEDURE — 99211 OFF/OP EST MAY X REQ PHY/QHP: CPT | Mod: S$GLB,,, | Performed by: OBSTETRICS & GYNECOLOGY

## 2021-06-14 PROCEDURE — 86900 BLOOD TYPING SEROLOGIC ABO: CPT | Performed by: OBSTETRICS & GYNECOLOGY

## 2021-06-14 PROCEDURE — 1126F PR PAIN SEVERITY QUANTIFIED, NO PAIN PRESENT: ICD-10-PCS | Mod: S$GLB,,, | Performed by: OBSTETRICS & GYNECOLOGY

## 2021-06-14 PROCEDURE — 99999 PR PBB SHADOW E&M-EST. PATIENT-LVL II: CPT | Mod: PBBFAC,,, | Performed by: OBSTETRICS & GYNECOLOGY

## 2021-06-14 PROCEDURE — 85025 COMPLETE CBC W/AUTO DIFF WBC: CPT | Performed by: OBSTETRICS & GYNECOLOGY

## 2021-06-14 PROCEDURE — 1126F AMNT PAIN NOTED NONE PRSNT: CPT | Mod: S$GLB,,, | Performed by: OBSTETRICS & GYNECOLOGY

## 2021-06-14 PROCEDURE — 36415 COLL VENOUS BLD VENIPUNCTURE: CPT | Performed by: OBSTETRICS & GYNECOLOGY

## 2021-06-14 PROCEDURE — 3008F PR BODY MASS INDEX (BMI) DOCUMENTED: ICD-10-PCS | Mod: CPTII,S$GLB,, | Performed by: OBSTETRICS & GYNECOLOGY

## 2021-06-14 PROCEDURE — 3008F BODY MASS INDEX DOCD: CPT | Mod: CPTII,S$GLB,, | Performed by: OBSTETRICS & GYNECOLOGY

## 2021-06-14 PROCEDURE — 99211 PR OFFICE/OUTPT VISIT, EST, LEVL I: ICD-10-PCS | Mod: S$GLB,,, | Performed by: OBSTETRICS & GYNECOLOGY

## 2021-06-14 PROCEDURE — 99999 PR PBB SHADOW E&M-EST. PATIENT-LVL II: ICD-10-PCS | Mod: PBBFAC,,, | Performed by: OBSTETRICS & GYNECOLOGY

## 2021-06-14 RX ORDER — ONDANSETRON 2 MG/ML
4 INJECTION INTRAMUSCULAR; INTRAVENOUS DAILY PRN
Status: CANCELLED | OUTPATIENT
Start: 2021-06-14

## 2021-06-14 RX ORDER — SODIUM CHLORIDE 0.9 % (FLUSH) 0.9 %
3 SYRINGE (ML) INJECTION
Status: CANCELLED | OUTPATIENT
Start: 2021-06-14

## 2021-06-14 RX ORDER — FAMOTIDINE 20 MG/1
20 TABLET, FILM COATED ORAL
Status: CANCELLED | OUTPATIENT
Start: 2021-06-14

## 2021-06-14 RX ORDER — PROGESTERONE 200 MG/1
200 CAPSULE ORAL DAILY
Qty: 30 CAPSULE | Refills: 11 | Status: SHIPPED | OUTPATIENT
Start: 2021-06-14 | End: 2021-06-24

## 2021-06-14 RX ORDER — PREGABALIN 25 MG/1
50 CAPSULE ORAL
Status: CANCELLED | OUTPATIENT
Start: 2021-06-14

## 2021-06-14 RX ORDER — MEPERIDINE HYDROCHLORIDE 100 MG/ML
12.5 INJECTION INTRAMUSCULAR; INTRAVENOUS; SUBCUTANEOUS ONCE AS NEEDED
Status: CANCELLED | OUTPATIENT
Start: 2021-06-14 | End: 2021-06-15

## 2021-06-14 RX ORDER — OXYCODONE HYDROCHLORIDE 5 MG/1
5 TABLET ORAL
Status: CANCELLED | OUTPATIENT
Start: 2021-06-14

## 2021-06-14 RX ORDER — DIPHENHYDRAMINE HYDROCHLORIDE 50 MG/ML
25 INJECTION INTRAMUSCULAR; INTRAVENOUS EVERY 4 HOURS PRN
Status: CANCELLED | OUTPATIENT
Start: 2021-06-14

## 2021-06-14 RX ORDER — ONDANSETRON 4 MG/1
8 TABLET, ORALLY DISINTEGRATING ORAL EVERY 8 HOURS PRN
Status: CANCELLED | OUTPATIENT
Start: 2021-06-14

## 2021-06-14 RX ORDER — AMOXICILLIN 250 MG
1 CAPSULE ORAL 2 TIMES DAILY
Status: CANCELLED | OUTPATIENT
Start: 2021-06-14

## 2021-06-14 RX ORDER — HYDROMORPHONE HYDROCHLORIDE 2 MG/ML
0.2 INJECTION, SOLUTION INTRAMUSCULAR; INTRAVENOUS; SUBCUTANEOUS EVERY 5 MIN PRN
Status: CANCELLED | OUTPATIENT
Start: 2021-06-14

## 2021-06-14 RX ORDER — ACETAMINOPHEN 500 MG
1000 TABLET ORAL
Status: CANCELLED | OUTPATIENT
Start: 2021-06-14

## 2021-06-14 RX ORDER — DIPHENHYDRAMINE HCL 25 MG
25 CAPSULE ORAL EVERY 4 HOURS PRN
Status: CANCELLED | OUTPATIENT
Start: 2021-06-14

## 2021-06-14 RX ORDER — CELECOXIB 100 MG/1
400 CAPSULE ORAL
Status: CANCELLED | OUTPATIENT
Start: 2021-06-14

## 2021-06-14 RX ORDER — POLYETHYLENE GLYCOL 3350 17 G/17G
17 POWDER, FOR SOLUTION ORAL DAILY
Status: CANCELLED | OUTPATIENT
Start: 2021-06-14

## 2021-06-14 RX ORDER — LIDOCAINE HYDROCHLORIDE 10 MG/ML
0.5 INJECTION, SOLUTION EPIDURAL; INFILTRATION; INTRACAUDAL; PERINEURAL
Status: CANCELLED | OUTPATIENT
Start: 2021-06-14

## 2021-06-14 RX ORDER — MUPIROCIN 20 MG/G
OINTMENT TOPICAL
Status: CANCELLED | OUTPATIENT
Start: 2021-06-14

## 2021-06-20 ENCOUNTER — LAB VISIT (OUTPATIENT)
Dept: URGENT CARE | Facility: CLINIC | Age: 46
End: 2021-06-20
Payer: COMMERCIAL

## 2021-06-20 DIAGNOSIS — Z01.818 PRE-PROCEDURAL EXAMINATION: ICD-10-CM

## 2021-06-20 PROCEDURE — U0005 INFEC AGEN DETEC AMPLI PROBE: HCPCS | Performed by: OBSTETRICS & GYNECOLOGY

## 2021-06-20 PROCEDURE — U0003 INFECTIOUS AGENT DETECTION BY NUCLEIC ACID (DNA OR RNA); SEVERE ACUTE RESPIRATORY SYNDROME CORONAVIRUS 2 (SARS-COV-2) (CORONAVIRUS DISEASE [COVID-19]), AMPLIFIED PROBE TECHNIQUE, MAKING USE OF HIGH THROUGHPUT TECHNOLOGIES AS DESCRIBED BY CMS-2020-01-R: HCPCS | Performed by: OBSTETRICS & GYNECOLOGY

## 2021-06-21 LAB — SARS-COV-2 RNA RESP QL NAA+PROBE: NOT DETECTED

## 2021-06-22 ENCOUNTER — TELEPHONE (OUTPATIENT)
Dept: OBSTETRICS AND GYNECOLOGY | Facility: CLINIC | Age: 46
End: 2021-06-22

## 2021-06-23 ENCOUNTER — HOSPITAL ENCOUNTER (OUTPATIENT)
Facility: OTHER | Age: 46
Discharge: HOME | End: 2021-06-24
Attending: OBSTETRICS & GYNECOLOGY | Admitting: OBSTETRICS & GYNECOLOGY
Payer: COMMERCIAL

## 2021-06-23 ENCOUNTER — ANESTHESIA (OUTPATIENT)
Dept: SURGERY | Facility: OTHER | Age: 46
End: 2021-06-23
Payer: COMMERCIAL

## 2021-06-23 DIAGNOSIS — N92.0 MENORRHAGIA WITH REGULAR CYCLE: ICD-10-CM

## 2021-06-23 DIAGNOSIS — R10.2 PELVIC PAIN: ICD-10-CM

## 2021-06-23 DIAGNOSIS — R10.2 PELVIC PRESSURE IN FEMALE: ICD-10-CM

## 2021-06-23 DIAGNOSIS — Z98.890 STATUS POST ROBOT-ASSISTED SURGICAL PROCEDURE: Primary | ICD-10-CM

## 2021-06-23 DIAGNOSIS — N80.129 ENDOMETRIOMA: ICD-10-CM

## 2021-06-23 LAB
B-HCG UR QL: NEGATIVE
CTP QC/QA: YES
POCT GLUCOSE: 96 MG/DL (ref 70–110)

## 2021-06-23 PROCEDURE — 25000003 PHARM REV CODE 250: Performed by: OBSTETRICS & GYNECOLOGY

## 2021-06-23 PROCEDURE — 88305 TISSUE EXAM BY PATHOLOGIST: CPT | Mod: 26,,, | Performed by: PATHOLOGY

## 2021-06-23 PROCEDURE — 88307 TISSUE EXAM BY PATHOLOGIST: CPT | Mod: 26,,, | Performed by: PATHOLOGY

## 2021-06-23 PROCEDURE — 58571 PR LAPAROSCOPY W TOT HYSTERECTUTERUS <=250 GRAM  W TUBE/OVARY: ICD-10-PCS | Mod: 82,,, | Performed by: OBSTETRICS & GYNECOLOGY

## 2021-06-23 PROCEDURE — 71000033 HC RECOVERY, INTIAL HOUR: Performed by: OBSTETRICS & GYNECOLOGY

## 2021-06-23 PROCEDURE — 63600175 PHARM REV CODE 636 W HCPCS: Performed by: NURSE ANESTHETIST, CERTIFIED REGISTERED

## 2021-06-23 PROCEDURE — 25000003 PHARM REV CODE 250: Performed by: NURSE ANESTHETIST, CERTIFIED REGISTERED

## 2021-06-23 PROCEDURE — 81025 URINE PREGNANCY TEST: CPT | Performed by: OBSTETRICS & GYNECOLOGY

## 2021-06-23 PROCEDURE — 58571 TLH W/T/O 250 G OR LESS: CPT | Mod: 82,,, | Performed by: OBSTETRICS & GYNECOLOGY

## 2021-06-23 PROCEDURE — 25000003 PHARM REV CODE 250

## 2021-06-23 PROCEDURE — 27201423 OPTIME MED/SURG SUP & DEVICES STERILE SUPPLY: Performed by: OBSTETRICS & GYNECOLOGY

## 2021-06-23 PROCEDURE — 88307 PR  SURG PATH,LEVEL V: ICD-10-PCS | Mod: 26,,, | Performed by: PATHOLOGY

## 2021-06-23 PROCEDURE — 88305 TISSUE EXAM BY PATHOLOGIST: ICD-10-PCS | Mod: 26,,, | Performed by: PATHOLOGY

## 2021-06-23 PROCEDURE — 37000008 HC ANESTHESIA 1ST 15 MINUTES: Performed by: OBSTETRICS & GYNECOLOGY

## 2021-06-23 PROCEDURE — 88307 TISSUE EXAM BY PATHOLOGIST: CPT | Performed by: PATHOLOGY

## 2021-06-23 PROCEDURE — 37000009 HC ANESTHESIA EA ADD 15 MINS: Performed by: OBSTETRICS & GYNECOLOGY

## 2021-06-23 PROCEDURE — 63600175 PHARM REV CODE 636 W HCPCS: Performed by: STUDENT IN AN ORGANIZED HEALTH CARE EDUCATION/TRAINING PROGRAM

## 2021-06-23 PROCEDURE — C2628 CATHETER, OCCLUSION: HCPCS | Performed by: OBSTETRICS & GYNECOLOGY

## 2021-06-23 PROCEDURE — 58571 PR LAPAROSCOPY W TOT HYSTERECTUTERUS <=250 GRAM  W TUBE/OVARY: ICD-10-PCS | Mod: ,,, | Performed by: OBSTETRICS & GYNECOLOGY

## 2021-06-23 PROCEDURE — 36000713 HC OR TIME LEV V EA ADD 15 MIN: Performed by: OBSTETRICS & GYNECOLOGY

## 2021-06-23 PROCEDURE — 63600175 PHARM REV CODE 636 W HCPCS: Performed by: OBSTETRICS & GYNECOLOGY

## 2021-06-23 PROCEDURE — 71000039 HC RECOVERY, EACH ADD'L HOUR: Performed by: OBSTETRICS & GYNECOLOGY

## 2021-06-23 PROCEDURE — 58571 TLH W/T/O 250 G OR LESS: CPT | Mod: ,,, | Performed by: OBSTETRICS & GYNECOLOGY

## 2021-06-23 PROCEDURE — 36000712 HC OR TIME LEV V 1ST 15 MIN: Performed by: OBSTETRICS & GYNECOLOGY

## 2021-06-23 PROCEDURE — 25000003 PHARM REV CODE 250: Performed by: STUDENT IN AN ORGANIZED HEALTH CARE EDUCATION/TRAINING PROGRAM

## 2021-06-23 PROCEDURE — 88302 TISSUE EXAM BY PATHOLOGIST: CPT | Performed by: PATHOLOGY

## 2021-06-23 RX ORDER — ONDANSETRON 2 MG/ML
4 INJECTION INTRAMUSCULAR; INTRAVENOUS DAILY PRN
Status: DISCONTINUED | OUTPATIENT
Start: 2021-06-23 | End: 2021-06-23 | Stop reason: HOSPADM

## 2021-06-23 RX ORDER — PROPOFOL 10 MG/ML
VIAL (ML) INTRAVENOUS
Status: DISCONTINUED | OUTPATIENT
Start: 2021-06-23 | End: 2021-06-23

## 2021-06-23 RX ORDER — PROPOFOL 10 MG/ML
VIAL (ML) INTRAVENOUS CONTINUOUS PRN
Status: DISCONTINUED | OUTPATIENT
Start: 2021-06-23 | End: 2021-06-23

## 2021-06-23 RX ORDER — KETOROLAC TROMETHAMINE 30 MG/ML
INJECTION, SOLUTION INTRAMUSCULAR; INTRAVENOUS
Status: DISCONTINUED | OUTPATIENT
Start: 2021-06-23 | End: 2021-06-23

## 2021-06-23 RX ORDER — FAMOTIDINE 20 MG/1
20 TABLET, FILM COATED ORAL
Status: COMPLETED | OUTPATIENT
Start: 2021-06-23 | End: 2021-06-23

## 2021-06-23 RX ORDER — DIPHENHYDRAMINE HCL 25 MG
25 CAPSULE ORAL EVERY 4 HOURS PRN
Status: DISCONTINUED | OUTPATIENT
Start: 2021-06-23 | End: 2021-06-24 | Stop reason: HOSPADM

## 2021-06-23 RX ORDER — LIDOCAINE HYDROCHLORIDE 10 MG/ML
0.5 INJECTION, SOLUTION EPIDURAL; INFILTRATION; INTRACAUDAL; PERINEURAL
Status: DISCONTINUED | OUTPATIENT
Start: 2021-06-23 | End: 2021-06-23 | Stop reason: HOSPADM

## 2021-06-23 RX ORDER — TRAMADOL HYDROCHLORIDE 50 MG/1
50 TABLET ORAL EVERY 6 HOURS PRN
Status: DISCONTINUED | OUTPATIENT
Start: 2021-06-23 | End: 2021-06-24 | Stop reason: HOSPADM

## 2021-06-23 RX ORDER — PREGABALIN 50 MG/1
50 CAPSULE ORAL
Status: COMPLETED | OUTPATIENT
Start: 2021-06-23 | End: 2021-06-23

## 2021-06-23 RX ORDER — CEFAZOLIN SODIUM 1 G/3ML
2 INJECTION, POWDER, FOR SOLUTION INTRAMUSCULAR; INTRAVENOUS
Status: COMPLETED | OUTPATIENT
Start: 2021-06-23 | End: 2021-06-23

## 2021-06-23 RX ORDER — PROCHLORPERAZINE EDISYLATE 5 MG/ML
5 INJECTION INTRAMUSCULAR; INTRAVENOUS EVERY 6 HOURS PRN
Status: DISCONTINUED | OUTPATIENT
Start: 2021-06-23 | End: 2021-06-24 | Stop reason: HOSPADM

## 2021-06-23 RX ORDER — DIPHENHYDRAMINE HYDROCHLORIDE 50 MG/ML
25 INJECTION INTRAMUSCULAR; INTRAVENOUS EVERY 4 HOURS PRN
Status: DISCONTINUED | OUTPATIENT
Start: 2021-06-23 | End: 2021-06-24 | Stop reason: HOSPADM

## 2021-06-23 RX ORDER — MUPIROCIN 20 MG/G
OINTMENT TOPICAL
Status: COMPLETED | OUTPATIENT
Start: 2021-06-23 | End: 2021-06-23

## 2021-06-23 RX ORDER — ESTRADIOL 1 MG/1
1 TABLET ORAL DAILY
Status: DISCONTINUED | OUTPATIENT
Start: 2021-06-23 | End: 2021-06-24 | Stop reason: HOSPADM

## 2021-06-23 RX ORDER — DEXAMETHASONE SODIUM PHOSPHATE 4 MG/ML
INJECTION, SOLUTION INTRA-ARTICULAR; INTRALESIONAL; INTRAMUSCULAR; INTRAVENOUS; SOFT TISSUE
Status: DISCONTINUED | OUTPATIENT
Start: 2021-06-23 | End: 2021-06-23

## 2021-06-23 RX ORDER — ACETAMINOPHEN 500 MG
1000 TABLET ORAL
Status: COMPLETED | OUTPATIENT
Start: 2021-06-23 | End: 2021-06-23

## 2021-06-23 RX ORDER — FENTANYL CITRATE 50 UG/ML
INJECTION, SOLUTION INTRAMUSCULAR; INTRAVENOUS
Status: DISCONTINUED | OUTPATIENT
Start: 2021-06-23 | End: 2021-06-23

## 2021-06-23 RX ORDER — LIDOCAINE HCL/PF 100 MG/5ML
SYRINGE (ML) INTRAVENOUS
Status: DISCONTINUED | OUTPATIENT
Start: 2021-06-23 | End: 2021-06-23

## 2021-06-23 RX ORDER — MUPIROCIN 20 MG/G
OINTMENT TOPICAL 2 TIMES DAILY
Status: DISCONTINUED | OUTPATIENT
Start: 2021-06-23 | End: 2021-06-24 | Stop reason: HOSPADM

## 2021-06-23 RX ORDER — MIDAZOLAM HYDROCHLORIDE 1 MG/ML
INJECTION INTRAMUSCULAR; INTRAVENOUS
Status: DISCONTINUED | OUTPATIENT
Start: 2021-06-23 | End: 2021-06-23

## 2021-06-23 RX ORDER — MEPERIDINE HYDROCHLORIDE 25 MG/ML
12.5 INJECTION INTRAMUSCULAR; INTRAVENOUS; SUBCUTANEOUS ONCE AS NEEDED
Status: DISCONTINUED | OUTPATIENT
Start: 2021-06-23 | End: 2021-06-23 | Stop reason: HOSPADM

## 2021-06-23 RX ORDER — DIPHENHYDRAMINE HYDROCHLORIDE 50 MG/ML
INJECTION INTRAMUSCULAR; INTRAVENOUS
Status: DISCONTINUED | OUTPATIENT
Start: 2021-06-23 | End: 2021-06-23

## 2021-06-23 RX ORDER — HYDROMORPHONE HYDROCHLORIDE 2 MG/ML
0.2 INJECTION, SOLUTION INTRAMUSCULAR; INTRAVENOUS; SUBCUTANEOUS EVERY 5 MIN PRN
Status: DISCONTINUED | OUTPATIENT
Start: 2021-06-23 | End: 2021-06-23 | Stop reason: HOSPADM

## 2021-06-23 RX ORDER — ONDANSETRON HYDROCHLORIDE 2 MG/ML
INJECTION, SOLUTION INTRAMUSCULAR; INTRAVENOUS
Status: DISCONTINUED | OUTPATIENT
Start: 2021-06-23 | End: 2021-06-23

## 2021-06-23 RX ORDER — POLYETHYLENE GLYCOL 3350 17 G/17G
17 POWDER, FOR SOLUTION ORAL DAILY
Status: DISCONTINUED | OUTPATIENT
Start: 2021-06-24 | End: 2021-06-24 | Stop reason: HOSPADM

## 2021-06-23 RX ORDER — AMOXICILLIN 250 MG
1 CAPSULE ORAL 2 TIMES DAILY
Status: DISCONTINUED | OUTPATIENT
Start: 2021-06-23 | End: 2021-06-24 | Stop reason: HOSPADM

## 2021-06-23 RX ORDER — IBUPROFEN 600 MG/1
600 TABLET ORAL EVERY 6 HOURS
Status: DISCONTINUED | OUTPATIENT
Start: 2021-06-24 | End: 2021-06-24 | Stop reason: HOSPADM

## 2021-06-23 RX ORDER — BISACODYL 10 MG
10 SUPPOSITORY, RECTAL RECTAL DAILY PRN
Status: DISCONTINUED | OUTPATIENT
Start: 2021-06-23 | End: 2021-06-24 | Stop reason: HOSPADM

## 2021-06-23 RX ORDER — ONDANSETRON 8 MG/1
8 TABLET, ORALLY DISINTEGRATING ORAL EVERY 8 HOURS PRN
Status: DISCONTINUED | OUTPATIENT
Start: 2021-06-23 | End: 2021-06-24 | Stop reason: HOSPADM

## 2021-06-23 RX ORDER — CELECOXIB 200 MG/1
400 CAPSULE ORAL
Status: COMPLETED | OUTPATIENT
Start: 2021-06-23 | End: 2021-06-23

## 2021-06-23 RX ORDER — OXYCODONE HYDROCHLORIDE 5 MG/1
5 TABLET ORAL
Status: DISCONTINUED | OUTPATIENT
Start: 2021-06-23 | End: 2021-06-23 | Stop reason: HOSPADM

## 2021-06-23 RX ORDER — ROCURONIUM BROMIDE 10 MG/ML
INJECTION, SOLUTION INTRAVENOUS
Status: DISCONTINUED | OUTPATIENT
Start: 2021-06-23 | End: 2021-06-23

## 2021-06-23 RX ORDER — SCOLOPAMINE TRANSDERMAL SYSTEM 1 MG/1
PATCH, EXTENDED RELEASE TRANSDERMAL
Status: DISCONTINUED | OUTPATIENT
Start: 2021-06-23 | End: 2021-06-23

## 2021-06-23 RX ORDER — KETOROLAC TROMETHAMINE 30 MG/ML
30 INJECTION, SOLUTION INTRAMUSCULAR; INTRAVENOUS EVERY 8 HOURS
Status: DISCONTINUED | OUTPATIENT
Start: 2021-06-23 | End: 2021-06-24 | Stop reason: HOSPADM

## 2021-06-23 RX ORDER — SODIUM CHLORIDE 0.9 % (FLUSH) 0.9 %
3 SYRINGE (ML) INJECTION
Status: DISCONTINUED | OUTPATIENT
Start: 2021-06-23 | End: 2021-06-23 | Stop reason: HOSPADM

## 2021-06-23 RX ADMIN — ROCURONIUM BROMIDE 20 MG: 10 SOLUTION INTRAVENOUS at 11:06

## 2021-06-23 RX ADMIN — ESTRADIOL 1 MG: 1 TABLET ORAL at 03:06

## 2021-06-23 RX ADMIN — TRAMADOL HYDROCHLORIDE 50 MG: 50 TABLET, FILM COATED ORAL at 04:06

## 2021-06-23 RX ADMIN — PREGABALIN 50 MG: 50 CAPSULE ORAL at 09:06

## 2021-06-23 RX ADMIN — FENTANYL CITRATE 50 MCG: 50 INJECTION, SOLUTION INTRAMUSCULAR; INTRAVENOUS at 12:06

## 2021-06-23 RX ADMIN — LIDOCAINE HYDROCHLORIDE 60 MG: 20 INJECTION, SOLUTION INTRAVENOUS at 11:06

## 2021-06-23 RX ADMIN — DIPHENHYDRAMINE HYDROCHLORIDE 12.5 MG: 50 INJECTION, SOLUTION INTRAMUSCULAR; INTRAVENOUS at 11:06

## 2021-06-23 RX ADMIN — ONDANSETRON 8 MG: 8 TABLET, ORALLY DISINTEGRATING ORAL at 07:06

## 2021-06-23 RX ADMIN — PROPOFOL 200 MG: 10 INJECTION, EMULSION INTRAVENOUS at 11:06

## 2021-06-23 RX ADMIN — MUPIROCIN: 20 OINTMENT TOPICAL at 09:06

## 2021-06-23 RX ADMIN — SUGAMMADEX 200 MG: 100 INJECTION, SOLUTION INTRAVENOUS at 01:06

## 2021-06-23 RX ADMIN — FENTANYL CITRATE 50 MCG: 50 INJECTION, SOLUTION INTRAMUSCULAR; INTRAVENOUS at 01:06

## 2021-06-23 RX ADMIN — ROCURONIUM BROMIDE 30 MG: 10 SOLUTION INTRAVENOUS at 11:06

## 2021-06-23 RX ADMIN — INDIGO CARMINE 5 ML: 8 INJECTION, SOLUTION INTRAMUSCULAR; INTRAVENOUS at 01:06

## 2021-06-23 RX ADMIN — KETOROLAC TROMETHAMINE 30 MG: 30 INJECTION, SOLUTION INTRAMUSCULAR; INTRAVENOUS at 01:06

## 2021-06-23 RX ADMIN — ONDANSETRON 4 MG: 2 INJECTION, SOLUTION INTRAMUSCULAR; INTRAVENOUS at 01:06

## 2021-06-23 RX ADMIN — SCOPALAMINE 1 PATCH: 1 PATCH, EXTENDED RELEASE TRANSDERMAL at 10:06

## 2021-06-23 RX ADMIN — ACETAMINOPHEN 1000 MG: 500 TABLET, FILM COATED ORAL at 09:06

## 2021-06-23 RX ADMIN — FENTANYL CITRATE 50 MCG: 50 INJECTION, SOLUTION INTRAMUSCULAR; INTRAVENOUS at 11:06

## 2021-06-23 RX ADMIN — FAMOTIDINE 20 MG: 20 TABLET ORAL at 09:06

## 2021-06-23 RX ADMIN — STANDARDIZED SENNA CONCENTRATE AND DOCUSATE SODIUM 1 TABLET: 8.6; 5 TABLET ORAL at 09:06

## 2021-06-23 RX ADMIN — MIDAZOLAM HYDROCHLORIDE 2 MG: 1 INJECTION, SOLUTION INTRAMUSCULAR; INTRAVENOUS at 11:06

## 2021-06-23 RX ADMIN — HYDROMORPHONE HYDROCHLORIDE 0.2 MG: 2 INJECTION INTRAMUSCULAR; INTRAVENOUS; SUBCUTANEOUS at 03:06

## 2021-06-23 RX ADMIN — CEFAZOLIN 2 G: 330 INJECTION, POWDER, FOR SOLUTION INTRAMUSCULAR; INTRAVENOUS at 11:06

## 2021-06-23 RX ADMIN — ROCURONIUM BROMIDE 10 MG: 10 SOLUTION INTRAVENOUS at 12:06

## 2021-06-23 RX ADMIN — PROPOFOL 200 MCG/KG/MIN: 10 INJECTION, EMULSION INTRAVENOUS at 11:06

## 2021-06-23 RX ADMIN — CELECOXIB 400 MG: 200 CAPSULE ORAL at 09:06

## 2021-06-23 RX ADMIN — PROCHLORPERAZINE EDISYLATE 5 MG: 5 INJECTION INTRAMUSCULAR; INTRAVENOUS at 08:06

## 2021-06-23 RX ADMIN — ROCURONIUM BROMIDE 20 MG: 10 SOLUTION INTRAVENOUS at 12:06

## 2021-06-23 RX ADMIN — CARBOXYMETHYLCELLULOSE SODIUM 2 DROP: 2.5 SOLUTION/ DROPS OPHTHALMIC at 11:06

## 2021-06-23 RX ADMIN — KETOROLAC TROMETHAMINE 30 MG: 30 INJECTION, SOLUTION INTRAMUSCULAR at 09:06

## 2021-06-23 RX ADMIN — DEXAMETHASONE SODIUM PHOSPHATE 8 MG: 4 INJECTION, SOLUTION INTRAMUSCULAR; INTRAVENOUS at 11:06

## 2021-06-24 ENCOUNTER — TELEPHONE (OUTPATIENT)
Dept: OBSTETRICS AND GYNECOLOGY | Facility: CLINIC | Age: 46
End: 2021-06-24

## 2021-06-24 ENCOUNTER — PATIENT MESSAGE (OUTPATIENT)
Dept: OBSTETRICS AND GYNECOLOGY | Facility: CLINIC | Age: 46
End: 2021-06-24

## 2021-06-24 VITALS
HEIGHT: 61 IN | WEIGHT: 131.19 LBS | OXYGEN SATURATION: 99 % | DIASTOLIC BLOOD PRESSURE: 65 MMHG | TEMPERATURE: 98 F | RESPIRATION RATE: 18 BRPM | SYSTOLIC BLOOD PRESSURE: 129 MMHG | BODY MASS INDEX: 24.77 KG/M2 | HEART RATE: 58 BPM

## 2021-06-24 DIAGNOSIS — E89.40 SURGICAL MENOPAUSE: Primary | ICD-10-CM

## 2021-06-24 LAB
ERYTHROCYTE [DISTWIDTH] IN BLOOD BY AUTOMATED COUNT: 13.2 % (ref 11.5–14.5)
HCT VFR BLD AUTO: 37 % (ref 37–48.5)
HGB BLD-MCNC: 12.1 G/DL (ref 12–16)
MCH RBC QN AUTO: 30.7 PG (ref 27–31)
MCHC RBC AUTO-ENTMCNC: 32.7 G/DL (ref 32–36)
MCV RBC AUTO: 94 FL (ref 82–98)
PLATELET # BLD AUTO: 273 K/UL (ref 150–450)
PMV BLD AUTO: 11.1 FL (ref 9.2–12.9)
RBC # BLD AUTO: 3.94 M/UL (ref 4–5.4)
WBC # BLD AUTO: 11.35 K/UL (ref 3.9–12.7)

## 2021-06-24 PROCEDURE — 36415 COLL VENOUS BLD VENIPUNCTURE: CPT | Performed by: STUDENT IN AN ORGANIZED HEALTH CARE EDUCATION/TRAINING PROGRAM

## 2021-06-24 PROCEDURE — 99900035 HC TECH TIME PER 15 MIN (STAT)

## 2021-06-24 PROCEDURE — 63600175 PHARM REV CODE 636 W HCPCS: Performed by: STUDENT IN AN ORGANIZED HEALTH CARE EDUCATION/TRAINING PROGRAM

## 2021-06-24 PROCEDURE — 94799 UNLISTED PULMONARY SVC/PX: CPT

## 2021-06-24 PROCEDURE — 85027 COMPLETE CBC AUTOMATED: CPT | Performed by: STUDENT IN AN ORGANIZED HEALTH CARE EDUCATION/TRAINING PROGRAM

## 2021-06-24 PROCEDURE — 25000003 PHARM REV CODE 250: Performed by: OBSTETRICS & GYNECOLOGY

## 2021-06-24 RX ORDER — IBUPROFEN 800 MG/1
800 TABLET ORAL EVERY 8 HOURS PRN
Qty: 30 TABLET | Refills: 1 | Status: SHIPPED | OUTPATIENT
Start: 2021-06-24 | End: 2021-09-20

## 2021-06-24 RX ORDER — ONDANSETRON 4 MG/1
4 TABLET, FILM COATED ORAL EVERY 6 HOURS PRN
Qty: 20 TABLET | Refills: 0 | Status: SHIPPED | OUTPATIENT
Start: 2021-06-24 | End: 2021-09-20

## 2021-06-24 RX ORDER — ACETAMINOPHEN 325 MG/1
650 TABLET ORAL EVERY 8 HOURS PRN
Status: DISCONTINUED | OUTPATIENT
Start: 2021-06-24 | End: 2021-06-24 | Stop reason: HOSPADM

## 2021-06-24 RX ORDER — ACETAMINOPHEN 325 MG/1
650 TABLET ORAL EVERY 8 HOURS PRN
Status: DISCONTINUED | OUTPATIENT
Start: 2021-06-24 | End: 2021-06-24

## 2021-06-24 RX ORDER — DEXTROMETHORPHAN HYDROBROMIDE, GUAIFENESIN 5; 100 MG/5ML; MG/5ML
650 LIQUID ORAL EVERY 8 HOURS
Qty: 30 TABLET | Refills: 0 | Status: SHIPPED | OUTPATIENT
Start: 2021-06-24 | End: 2021-09-20

## 2021-06-24 RX ORDER — PROGESTERONE 100 MG/1
CAPSULE ORAL
Qty: 90 CAPSULE | Refills: 3 | Status: SHIPPED | OUTPATIENT
Start: 2021-06-24 | End: 2021-06-24

## 2021-06-24 RX ORDER — ESTRADIOL 1 MG/1
1 TABLET ORAL DAILY
Qty: 90 TABLET | Refills: 3 | Status: SHIPPED | OUTPATIENT
Start: 2021-06-24 | End: 2021-09-20

## 2021-06-24 RX ADMIN — ESTRADIOL 1 MG: 1 TABLET ORAL at 08:06

## 2021-06-24 RX ADMIN — STANDARDIZED SENNA CONCENTRATE AND DOCUSATE SODIUM 1 TABLET: 8.6; 5 TABLET ORAL at 08:06

## 2021-06-24 RX ADMIN — POLYETHYLENE GLYCOL 3350 17 G: 17 POWDER, FOR SOLUTION ORAL at 08:06

## 2021-06-24 RX ADMIN — MUPIROCIN: 20 OINTMENT TOPICAL at 08:06

## 2021-06-24 RX ADMIN — KETOROLAC TROMETHAMINE 30 MG: 30 INJECTION, SOLUTION INTRAMUSCULAR at 05:06

## 2021-06-24 RX ADMIN — ACETAMINOPHEN 650 MG: 325 TABLET, FILM COATED ORAL at 09:06

## 2021-06-25 ENCOUNTER — TELEPHONE (OUTPATIENT)
Dept: OBSTETRICS AND GYNECOLOGY | Facility: CLINIC | Age: 46
End: 2021-06-25

## 2021-06-30 ENCOUNTER — TELEPHONE (OUTPATIENT)
Dept: OBSTETRICS AND GYNECOLOGY | Facility: CLINIC | Age: 46
End: 2021-06-30

## 2021-06-30 ENCOUNTER — HOSPITAL ENCOUNTER (OUTPATIENT)
Dept: RADIOLOGY | Facility: OTHER | Age: 46
Discharge: HOME | End: 2021-06-30
Attending: OBSTETRICS & GYNECOLOGY
Payer: COMMERCIAL

## 2021-06-30 ENCOUNTER — OFFICE VISIT (OUTPATIENT)
Dept: OBSTETRICS AND GYNECOLOGY | Facility: CLINIC | Age: 46
End: 2021-06-30
Attending: OBSTETRICS & GYNECOLOGY
Payer: COMMERCIAL

## 2021-06-30 VITALS
HEIGHT: 61 IN | SYSTOLIC BLOOD PRESSURE: 122 MMHG | DIASTOLIC BLOOD PRESSURE: 74 MMHG | WEIGHT: 117.19 LBS | BODY MASS INDEX: 22.13 KG/M2

## 2021-06-30 DIAGNOSIS — R10.31 RIGHT LOWER QUADRANT ABDOMINAL PAIN: ICD-10-CM

## 2021-06-30 DIAGNOSIS — G89.18 POSTOPERATIVE PAIN: Primary | ICD-10-CM

## 2021-06-30 PROCEDURE — 74177 CT ABD & PELVIS W/CONTRAST: CPT | Mod: TC

## 2021-06-30 PROCEDURE — 3008F PR BODY MASS INDEX (BMI) DOCUMENTED: ICD-10-PCS | Mod: CPTII,S$GLB,, | Performed by: OBSTETRICS & GYNECOLOGY

## 2021-06-30 PROCEDURE — 25500020 PHARM REV CODE 255: Performed by: OBSTETRICS & GYNECOLOGY

## 2021-06-30 PROCEDURE — 99214 OFFICE O/P EST MOD 30 MIN: CPT | Mod: S$GLB,,, | Performed by: OBSTETRICS & GYNECOLOGY

## 2021-06-30 PROCEDURE — 99999 PR PBB SHADOW E&M-EST. PATIENT-LVL III: CPT | Mod: PBBFAC,,, | Performed by: OBSTETRICS & GYNECOLOGY

## 2021-06-30 PROCEDURE — 74177 CT ABDOMEN PELVIS WITH CONTRAST: ICD-10-PCS | Mod: 26,,, | Performed by: INTERNAL MEDICINE

## 2021-06-30 PROCEDURE — 99214 PR OFFICE/OUTPT VISIT, EST, LEVL IV, 30-39 MIN: ICD-10-PCS | Mod: S$GLB,,, | Performed by: OBSTETRICS & GYNECOLOGY

## 2021-06-30 PROCEDURE — A9698 NON-RAD CONTRAST MATERIALNOC: HCPCS | Performed by: OBSTETRICS & GYNECOLOGY

## 2021-06-30 PROCEDURE — 99999 PR PBB SHADOW E&M-EST. PATIENT-LVL III: ICD-10-PCS | Mod: PBBFAC,,, | Performed by: OBSTETRICS & GYNECOLOGY

## 2021-06-30 PROCEDURE — 74177 CT ABD & PELVIS W/CONTRAST: CPT | Mod: 26,,, | Performed by: INTERNAL MEDICINE

## 2021-06-30 PROCEDURE — 3008F BODY MASS INDEX DOCD: CPT | Mod: CPTII,S$GLB,, | Performed by: OBSTETRICS & GYNECOLOGY

## 2021-06-30 PROCEDURE — 1125F AMNT PAIN NOTED PAIN PRSNT: CPT | Mod: S$GLB,,, | Performed by: OBSTETRICS & GYNECOLOGY

## 2021-06-30 PROCEDURE — 1125F PR PAIN SEVERITY QUANTIFIED, PAIN PRESENT: ICD-10-PCS | Mod: S$GLB,,, | Performed by: OBSTETRICS & GYNECOLOGY

## 2021-06-30 RX ADMIN — IOHEXOL 1000 ML: 9 SOLUTION ORAL at 02:06

## 2021-06-30 RX ADMIN — IOHEXOL 75 ML: 350 INJECTION, SOLUTION INTRAVENOUS at 03:06

## 2021-07-01 LAB
FINAL PATHOLOGIC DIAGNOSIS: NORMAL
Lab: NORMAL

## 2021-07-05 ENCOUNTER — TELEPHONE (OUTPATIENT)
Dept: OBSTETRICS AND GYNECOLOGY | Facility: CLINIC | Age: 46
End: 2021-07-05

## 2021-07-16 ENCOUNTER — OFFICE VISIT (OUTPATIENT)
Dept: OBSTETRICS AND GYNECOLOGY | Facility: CLINIC | Age: 46
End: 2021-07-16
Attending: OBSTETRICS & GYNECOLOGY
Payer: COMMERCIAL

## 2021-07-16 VITALS
DIASTOLIC BLOOD PRESSURE: 82 MMHG | WEIGHT: 114.75 LBS | SYSTOLIC BLOOD PRESSURE: 122 MMHG | HEIGHT: 61 IN | BODY MASS INDEX: 21.66 KG/M2

## 2021-07-16 DIAGNOSIS — Z09 POSTOP CHECK: Primary | ICD-10-CM

## 2021-07-16 PROBLEM — N94.6 DYSMENORRHEA: Status: RESOLVED | Noted: 2021-04-05 | Resolved: 2021-07-16

## 2021-07-16 PROBLEM — N92.0 MENORRHAGIA WITH REGULAR CYCLE: Status: RESOLVED | Noted: 2021-04-05 | Resolved: 2021-07-16

## 2021-07-16 PROBLEM — N93.8 DUB (DYSFUNCTIONAL UTERINE BLEEDING): Status: RESOLVED | Noted: 2021-03-08 | Resolved: 2021-07-16

## 2021-07-16 PROBLEM — R10.2 PELVIC PAIN: Status: RESOLVED | Noted: 2021-06-23 | Resolved: 2021-07-16

## 2021-07-16 PROBLEM — N80.129 ENDOMETRIOMA: Status: RESOLVED | Noted: 2021-06-23 | Resolved: 2021-07-16

## 2021-07-16 PROBLEM — N80.129 ENDOMETRIOMA OF OVARY: Status: RESOLVED | Noted: 2020-05-25 | Resolved: 2021-07-16

## 2021-07-16 PROBLEM — R10.2 PELVIC PRESSURE IN FEMALE: Status: RESOLVED | Noted: 2021-03-12 | Resolved: 2021-07-16

## 2021-07-16 PROCEDURE — 1126F AMNT PAIN NOTED NONE PRSNT: CPT | Mod: S$GLB,,, | Performed by: OBSTETRICS & GYNECOLOGY

## 2021-07-16 PROCEDURE — 99999 PR PBB SHADOW E&M-EST. PATIENT-LVL III: ICD-10-PCS | Mod: PBBFAC,,, | Performed by: OBSTETRICS & GYNECOLOGY

## 2021-07-16 PROCEDURE — 99024 PR POST-OP FOLLOW-UP VISIT: ICD-10-PCS | Mod: S$GLB,,, | Performed by: OBSTETRICS & GYNECOLOGY

## 2021-07-16 PROCEDURE — 3008F BODY MASS INDEX DOCD: CPT | Mod: CPTII,S$GLB,, | Performed by: OBSTETRICS & GYNECOLOGY

## 2021-07-16 PROCEDURE — 3008F PR BODY MASS INDEX (BMI) DOCUMENTED: ICD-10-PCS | Mod: CPTII,S$GLB,, | Performed by: OBSTETRICS & GYNECOLOGY

## 2021-07-16 PROCEDURE — 99024 POSTOP FOLLOW-UP VISIT: CPT | Mod: S$GLB,,, | Performed by: OBSTETRICS & GYNECOLOGY

## 2021-07-16 PROCEDURE — 99999 PR PBB SHADOW E&M-EST. PATIENT-LVL III: CPT | Mod: PBBFAC,,, | Performed by: OBSTETRICS & GYNECOLOGY

## 2021-07-16 PROCEDURE — 1126F PR PAIN SEVERITY QUANTIFIED, NO PAIN PRESENT: ICD-10-PCS | Mod: S$GLB,,, | Performed by: OBSTETRICS & GYNECOLOGY

## 2021-08-26 ENCOUNTER — PATIENT MESSAGE (OUTPATIENT)
Dept: ADMINISTRATIVE | Facility: OTHER | Age: 46
End: 2021-08-26

## 2021-08-27 ENCOUNTER — PATIENT MESSAGE (OUTPATIENT)
Dept: UROGYNECOLOGY | Facility: CLINIC | Age: 46
End: 2021-08-27

## 2021-08-27 DIAGNOSIS — G47.00 INSOMNIA, UNSPECIFIED TYPE: ICD-10-CM

## 2021-08-27 DIAGNOSIS — R39.15 URINARY URGENCY: ICD-10-CM

## 2021-08-27 RX ORDER — AMITRIPTYLINE HYDROCHLORIDE 10 MG/1
10 TABLET, FILM COATED ORAL NIGHTLY PRN
Qty: 30 TABLET | Refills: 3 | Status: SHIPPED | OUTPATIENT
Start: 2021-08-27 | End: 2023-04-10

## 2021-09-20 ENCOUNTER — OFFICE VISIT (OUTPATIENT)
Dept: OBSTETRICS AND GYNECOLOGY | Facility: CLINIC | Age: 46
End: 2021-09-20
Attending: OBSTETRICS & GYNECOLOGY
Payer: COMMERCIAL

## 2021-09-20 ENCOUNTER — LAB VISIT (OUTPATIENT)
Dept: LAB | Facility: OTHER | Age: 46
End: 2021-09-20
Attending: OBSTETRICS & GYNECOLOGY
Payer: COMMERCIAL

## 2021-09-20 VITALS
HEIGHT: 60 IN | SYSTOLIC BLOOD PRESSURE: 114 MMHG | WEIGHT: 108 LBS | BODY MASS INDEX: 21.2 KG/M2 | DIASTOLIC BLOOD PRESSURE: 80 MMHG

## 2021-09-20 DIAGNOSIS — N94.10 DYSPAREUNIA IN FEMALE: Primary | ICD-10-CM

## 2021-09-20 DIAGNOSIS — E89.40 SURGICAL MENOPAUSE: ICD-10-CM

## 2021-09-20 LAB — TESTOST SERPL-MCNC: 11 NG/DL (ref 5–73)

## 2021-09-20 PROCEDURE — 3079F DIAST BP 80-89 MM HG: CPT | Mod: CPTII,S$GLB,, | Performed by: OBSTETRICS & GYNECOLOGY

## 2021-09-20 PROCEDURE — 84403 ASSAY OF TOTAL TESTOSTERONE: CPT | Performed by: OBSTETRICS & GYNECOLOGY

## 2021-09-20 PROCEDURE — 3074F SYST BP LT 130 MM HG: CPT | Mod: CPTII,S$GLB,, | Performed by: OBSTETRICS & GYNECOLOGY

## 2021-09-20 PROCEDURE — 3008F BODY MASS INDEX DOCD: CPT | Mod: CPTII,S$GLB,, | Performed by: OBSTETRICS & GYNECOLOGY

## 2021-09-20 PROCEDURE — 3079F PR MOST RECENT DIASTOLIC BLOOD PRESSURE 80-89 MM HG: ICD-10-PCS | Mod: CPTII,S$GLB,, | Performed by: OBSTETRICS & GYNECOLOGY

## 2021-09-20 PROCEDURE — 99999 PR PBB SHADOW E&M-EST. PATIENT-LVL II: CPT | Mod: PBBFAC,,, | Performed by: OBSTETRICS & GYNECOLOGY

## 2021-09-20 PROCEDURE — 99213 OFFICE O/P EST LOW 20 MIN: CPT | Mod: 24,S$GLB,, | Performed by: OBSTETRICS & GYNECOLOGY

## 2021-09-20 PROCEDURE — 84402 ASSAY OF FREE TESTOSTERONE: CPT | Performed by: OBSTETRICS & GYNECOLOGY

## 2021-09-20 PROCEDURE — 3008F PR BODY MASS INDEX (BMI) DOCUMENTED: ICD-10-PCS | Mod: CPTII,S$GLB,, | Performed by: OBSTETRICS & GYNECOLOGY

## 2021-09-20 PROCEDURE — 3074F PR MOST RECENT SYSTOLIC BLOOD PRESSURE < 130 MM HG: ICD-10-PCS | Mod: CPTII,S$GLB,, | Performed by: OBSTETRICS & GYNECOLOGY

## 2021-09-20 PROCEDURE — 99213 PR OFFICE/OUTPT VISIT, EST, LEVL III, 20-29 MIN: ICD-10-PCS | Mod: 24,S$GLB,, | Performed by: OBSTETRICS & GYNECOLOGY

## 2021-09-20 PROCEDURE — 99999 PR PBB SHADOW E&M-EST. PATIENT-LVL II: ICD-10-PCS | Mod: PBBFAC,,, | Performed by: OBSTETRICS & GYNECOLOGY

## 2021-09-20 RX ORDER — ESTRADIOL 0.1 MG/G
CREAM VAGINAL
Qty: 42.5 G | Refills: 0 | Status: SHIPPED | OUTPATIENT
Start: 2021-09-20 | End: 2022-01-03 | Stop reason: SDUPTHER

## 2021-09-20 RX ORDER — ESTRADIOL 2 MG/1
2 TABLET ORAL DAILY
Qty: 90 TABLET | Refills: 3 | Status: SHIPPED | OUTPATIENT
Start: 2021-09-20 | End: 2022-01-03 | Stop reason: SDUPTHER

## 2021-09-20 RX ORDER — TESTOSTERONE CYPIONATE 200 MG/ML
50 INJECTION, SOLUTION INTRAMUSCULAR
Status: DISCONTINUED | OUTPATIENT
Start: 2021-09-20 | End: 2022-01-03

## 2021-09-20 RX ORDER — ESTRADIOL 0.1 MG/G
CREAM VAGINAL
Qty: 42.5 G | Refills: 6 | Status: SHIPPED | OUTPATIENT
Start: 2021-09-20 | End: 2022-01-03

## 2021-09-23 LAB — TESTOST FREE SERPL-MCNC: <0.4 PG/ML

## 2021-09-24 ENCOUNTER — CLINICAL SUPPORT (OUTPATIENT)
Dept: OBSTETRICS AND GYNECOLOGY | Facility: CLINIC | Age: 46
End: 2021-09-24
Payer: COMMERCIAL

## 2021-09-24 DIAGNOSIS — E89.40 SURGICAL MENOPAUSE: Primary | ICD-10-CM

## 2021-09-24 PROCEDURE — 96372 PR INJECTION,THERAP/PROPH/DIAG2ST, IM OR SUBCUT: ICD-10-PCS | Mod: S$GLB,,, | Performed by: OBSTETRICS & GYNECOLOGY

## 2021-09-24 PROCEDURE — 96372 THER/PROPH/DIAG INJ SC/IM: CPT | Mod: S$GLB,,, | Performed by: OBSTETRICS & GYNECOLOGY

## 2021-09-24 RX ADMIN — TESTOSTERONE CYPIONATE 50 MG: 200 INJECTION, SOLUTION INTRAMUSCULAR at 02:09

## 2021-10-04 ENCOUNTER — OFFICE VISIT (OUTPATIENT)
Dept: INTERNAL MEDICINE | Facility: CLINIC | Age: 46
End: 2021-10-04
Payer: COMMERCIAL

## 2021-10-04 VITALS
BODY MASS INDEX: 22.42 KG/M2 | HEART RATE: 76 BPM | WEIGHT: 114.19 LBS | SYSTOLIC BLOOD PRESSURE: 112 MMHG | OXYGEN SATURATION: 99 % | DIASTOLIC BLOOD PRESSURE: 78 MMHG | HEIGHT: 60 IN

## 2021-10-04 DIAGNOSIS — Z00.00 VISIT FOR ANNUAL HEALTH EXAMINATION: Primary | ICD-10-CM

## 2021-10-04 DIAGNOSIS — R20.2 NUMBNESS AND TINGLING: ICD-10-CM

## 2021-10-04 DIAGNOSIS — R20.0 NUMBNESS AND TINGLING: ICD-10-CM

## 2021-10-04 DIAGNOSIS — Z13.29 SCREENING FOR THYROID DISORDER: ICD-10-CM

## 2021-10-04 PROCEDURE — 1159F MED LIST DOCD IN RCRD: CPT | Mod: CPTII,S$GLB,, | Performed by: INTERNAL MEDICINE

## 2021-10-04 PROCEDURE — 99999 PR PBB SHADOW E&M-EST. PATIENT-LVL IV: ICD-10-PCS | Mod: PBBFAC,,, | Performed by: INTERNAL MEDICINE

## 2021-10-04 PROCEDURE — 1160F PR REVIEW ALL MEDS BY PRESCRIBER/CLIN PHARMACIST DOCUMENTED: ICD-10-PCS | Mod: CPTII,S$GLB,, | Performed by: INTERNAL MEDICINE

## 2021-10-04 PROCEDURE — 3074F SYST BP LT 130 MM HG: CPT | Mod: CPTII,S$GLB,, | Performed by: INTERNAL MEDICINE

## 2021-10-04 PROCEDURE — 3008F BODY MASS INDEX DOCD: CPT | Mod: CPTII,S$GLB,, | Performed by: INTERNAL MEDICINE

## 2021-10-04 PROCEDURE — 3078F DIAST BP <80 MM HG: CPT | Mod: CPTII,S$GLB,, | Performed by: INTERNAL MEDICINE

## 2021-10-04 PROCEDURE — 99999 PR PBB SHADOW E&M-EST. PATIENT-LVL IV: CPT | Mod: PBBFAC,,, | Performed by: INTERNAL MEDICINE

## 2021-10-04 PROCEDURE — 1160F RVW MEDS BY RX/DR IN RCRD: CPT | Mod: CPTII,S$GLB,, | Performed by: INTERNAL MEDICINE

## 2021-10-04 PROCEDURE — 3078F PR MOST RECENT DIASTOLIC BLOOD PRESSURE < 80 MM HG: ICD-10-PCS | Mod: CPTII,S$GLB,, | Performed by: INTERNAL MEDICINE

## 2021-10-04 PROCEDURE — 99396 PREV VISIT EST AGE 40-64: CPT | Mod: S$GLB,,, | Performed by: INTERNAL MEDICINE

## 2021-10-04 PROCEDURE — 3008F PR BODY MASS INDEX (BMI) DOCUMENTED: ICD-10-PCS | Mod: CPTII,S$GLB,, | Performed by: INTERNAL MEDICINE

## 2021-10-04 PROCEDURE — 99396 PR PREVENTIVE VISIT,EST,40-64: ICD-10-PCS | Mod: S$GLB,,, | Performed by: INTERNAL MEDICINE

## 2021-10-04 PROCEDURE — 3074F PR MOST RECENT SYSTOLIC BLOOD PRESSURE < 130 MM HG: ICD-10-PCS | Mod: CPTII,S$GLB,, | Performed by: INTERNAL MEDICINE

## 2021-10-04 PROCEDURE — 1159F PR MEDICATION LIST DOCUMENTED IN MEDICAL RECORD: ICD-10-PCS | Mod: CPTII,S$GLB,, | Performed by: INTERNAL MEDICINE

## 2021-10-05 ENCOUNTER — LAB VISIT (OUTPATIENT)
Dept: LAB | Facility: OTHER | Age: 46
End: 2021-10-05
Attending: INTERNAL MEDICINE
Payer: COMMERCIAL

## 2021-10-05 DIAGNOSIS — Z13.29 SCREENING FOR THYROID DISORDER: ICD-10-CM

## 2021-10-05 DIAGNOSIS — R20.0 NUMBNESS AND TINGLING: ICD-10-CM

## 2021-10-05 DIAGNOSIS — R20.2 NUMBNESS AND TINGLING: ICD-10-CM

## 2021-10-05 DIAGNOSIS — Z00.00 VISIT FOR ANNUAL HEALTH EXAMINATION: ICD-10-CM

## 2021-10-05 LAB
25(OH)D3+25(OH)D2 SERPL-MCNC: 66 NG/ML (ref 30–96)
ALBUMIN SERPL BCP-MCNC: 3.9 G/DL (ref 3.5–5.2)
ALP SERPL-CCNC: 47 U/L (ref 55–135)
ALT SERPL W/O P-5'-P-CCNC: 18 U/L (ref 10–44)
ANION GAP SERPL CALC-SCNC: 10 MMOL/L (ref 8–16)
AST SERPL-CCNC: 19 U/L (ref 10–40)
BASOPHILS # BLD AUTO: 0.05 K/UL (ref 0–0.2)
BASOPHILS NFR BLD: 1 % (ref 0–1.9)
BILIRUB SERPL-MCNC: 0.6 MG/DL (ref 0.1–1)
BUN SERPL-MCNC: 13 MG/DL (ref 6–20)
CALCIUM SERPL-MCNC: 9.2 MG/DL (ref 8.7–10.5)
CHLORIDE SERPL-SCNC: 106 MMOL/L (ref 95–110)
CHOLEST SERPL-MCNC: 135 MG/DL (ref 120–199)
CHOLEST/HDLC SERPL: 2.1 {RATIO} (ref 2–5)
CO2 SERPL-SCNC: 25 MMOL/L (ref 23–29)
CREAT SERPL-MCNC: 0.8 MG/DL (ref 0.5–1.4)
DIFFERENTIAL METHOD: NORMAL
EOSINOPHIL # BLD AUTO: 0.1 K/UL (ref 0–0.5)
EOSINOPHIL NFR BLD: 2 % (ref 0–8)
ERYTHROCYTE [DISTWIDTH] IN BLOOD BY AUTOMATED COUNT: 13.7 % (ref 11.5–14.5)
EST. GFR  (AFRICAN AMERICAN): >60 ML/MIN/1.73 M^2
EST. GFR  (NON AFRICAN AMERICAN): >60 ML/MIN/1.73 M^2
ESTIMATED AVG GLUCOSE: 97 MG/DL (ref 68–131)
GLUCOSE SERPL-MCNC: 87 MG/DL (ref 70–110)
HBA1C MFR BLD: 5 % (ref 4–5.6)
HCT VFR BLD AUTO: 42.1 % (ref 37–48.5)
HDLC SERPL-MCNC: 63 MG/DL (ref 40–75)
HDLC SERPL: 46.7 % (ref 20–50)
HGB BLD-MCNC: 13.8 G/DL (ref 12–16)
IMM GRANULOCYTES # BLD AUTO: 0.01 K/UL (ref 0–0.04)
IMM GRANULOCYTES NFR BLD AUTO: 0.2 % (ref 0–0.5)
LDLC SERPL CALC-MCNC: 59.6 MG/DL (ref 63–159)
LYMPHOCYTES # BLD AUTO: 1.3 K/UL (ref 1–4.8)
LYMPHOCYTES NFR BLD: 26.1 % (ref 18–48)
MCH RBC QN AUTO: 30 PG (ref 27–31)
MCHC RBC AUTO-ENTMCNC: 32.8 G/DL (ref 32–36)
MCV RBC AUTO: 92 FL (ref 82–98)
MONOCYTES # BLD AUTO: 0.3 K/UL (ref 0.3–1)
MONOCYTES NFR BLD: 6.3 % (ref 4–15)
NEUTROPHILS # BLD AUTO: 3.2 K/UL (ref 1.8–7.7)
NEUTROPHILS NFR BLD: 64.4 % (ref 38–73)
NONHDLC SERPL-MCNC: 72 MG/DL
NRBC BLD-RTO: 0 /100 WBC
PLATELET # BLD AUTO: 255 K/UL (ref 150–450)
PMV BLD AUTO: 11.3 FL (ref 9.2–12.9)
POTASSIUM SERPL-SCNC: 4.2 MMOL/L (ref 3.5–5.1)
PROT SERPL-MCNC: 6.7 G/DL (ref 6–8.4)
RBC # BLD AUTO: 4.6 M/UL (ref 4–5.4)
SODIUM SERPL-SCNC: 141 MMOL/L (ref 136–145)
TRIGL SERPL-MCNC: 62 MG/DL (ref 30–150)
TSH SERPL DL<=0.005 MIU/L-ACNC: 1.95 UIU/ML (ref 0.4–4)
VIT B12 SERPL-MCNC: 732 PG/ML (ref 210–950)
WBC # BLD AUTO: 4.95 K/UL (ref 3.9–12.7)

## 2021-10-05 PROCEDURE — 80053 COMPREHEN METABOLIC PANEL: CPT | Performed by: INTERNAL MEDICINE

## 2021-10-05 PROCEDURE — 85025 COMPLETE CBC W/AUTO DIFF WBC: CPT | Performed by: INTERNAL MEDICINE

## 2021-10-05 PROCEDURE — 80061 LIPID PANEL: CPT | Performed by: INTERNAL MEDICINE

## 2021-10-05 PROCEDURE — 83036 HEMOGLOBIN GLYCOSYLATED A1C: CPT | Performed by: INTERNAL MEDICINE

## 2021-10-05 PROCEDURE — 84443 ASSAY THYROID STIM HORMONE: CPT | Performed by: INTERNAL MEDICINE

## 2021-10-05 PROCEDURE — 82306 VITAMIN D 25 HYDROXY: CPT | Performed by: INTERNAL MEDICINE

## 2021-10-05 PROCEDURE — 82607 VITAMIN B-12: CPT | Performed by: INTERNAL MEDICINE

## 2021-10-05 PROCEDURE — 36415 COLL VENOUS BLD VENIPUNCTURE: CPT | Performed by: INTERNAL MEDICINE

## 2021-10-26 ENCOUNTER — CLINICAL SUPPORT (OUTPATIENT)
Dept: OBSTETRICS AND GYNECOLOGY | Facility: CLINIC | Age: 46
End: 2021-10-26
Payer: COMMERCIAL

## 2021-10-26 DIAGNOSIS — E89.40 SURGICAL MENOPAUSE: Primary | ICD-10-CM

## 2021-10-26 PROCEDURE — 96372 PR INJECTION,THERAP/PROPH/DIAG2ST, IM OR SUBCUT: ICD-10-PCS | Mod: S$GLB,,, | Performed by: OBSTETRICS & GYNECOLOGY

## 2021-10-26 PROCEDURE — 96372 THER/PROPH/DIAG INJ SC/IM: CPT | Mod: S$GLB,,, | Performed by: OBSTETRICS & GYNECOLOGY

## 2021-10-26 RX ADMIN — TESTOSTERONE CYPIONATE 50 MG: 200 INJECTION, SOLUTION INTRAMUSCULAR at 09:10

## 2021-11-16 ENCOUNTER — LAB VISIT (OUTPATIENT)
Dept: LAB | Facility: OTHER | Age: 46
End: 2021-11-16
Attending: OBSTETRICS & GYNECOLOGY
Payer: COMMERCIAL

## 2021-11-16 DIAGNOSIS — E89.40 SURGICAL MENOPAUSE: ICD-10-CM

## 2021-11-16 LAB — TESTOST SERPL-MCNC: 126 NG/DL (ref 5–73)

## 2021-11-16 PROCEDURE — 84402 ASSAY OF FREE TESTOSTERONE: CPT | Performed by: OBSTETRICS & GYNECOLOGY

## 2021-11-16 PROCEDURE — 84403 ASSAY OF TOTAL TESTOSTERONE: CPT | Performed by: OBSTETRICS & GYNECOLOGY

## 2021-11-18 ENCOUNTER — PATIENT MESSAGE (OUTPATIENT)
Dept: OBSTETRICS AND GYNECOLOGY | Facility: CLINIC | Age: 46
End: 2021-11-18
Payer: COMMERCIAL

## 2021-11-18 LAB — TESTOST FREE SERPL-MCNC: 0.6 PG/ML

## 2021-11-23 ENCOUNTER — CLINICAL SUPPORT (OUTPATIENT)
Dept: OBSTETRICS AND GYNECOLOGY | Facility: CLINIC | Age: 46
End: 2021-11-23
Payer: COMMERCIAL

## 2021-11-23 DIAGNOSIS — E89.40 SURGICAL MENOPAUSE: Primary | ICD-10-CM

## 2021-11-23 PROCEDURE — 96372 THER/PROPH/DIAG INJ SC/IM: CPT | Mod: S$GLB,,, | Performed by: OBSTETRICS & GYNECOLOGY

## 2021-11-23 PROCEDURE — 96372 PR INJECTION,THERAP/PROPH/DIAG2ST, IM OR SUBCUT: ICD-10-PCS | Mod: S$GLB,,, | Performed by: OBSTETRICS & GYNECOLOGY

## 2021-11-23 RX ADMIN — TESTOSTERONE CYPIONATE 50 MG: 200 INJECTION, SOLUTION INTRAMUSCULAR at 10:11

## 2021-12-08 ENCOUNTER — PATIENT MESSAGE (OUTPATIENT)
Dept: OBSTETRICS AND GYNECOLOGY | Facility: CLINIC | Age: 46
End: 2021-12-08
Payer: COMMERCIAL

## 2021-12-17 ENCOUNTER — TELEPHONE (OUTPATIENT)
Dept: OBSTETRICS AND GYNECOLOGY | Facility: CLINIC | Age: 46
End: 2021-12-17
Payer: COMMERCIAL

## 2021-12-21 ENCOUNTER — CLINICAL SUPPORT (OUTPATIENT)
Dept: OBSTETRICS AND GYNECOLOGY | Facility: CLINIC | Age: 46
End: 2021-12-21
Payer: COMMERCIAL

## 2021-12-21 DIAGNOSIS — E89.40 SURGICAL MENOPAUSE: Primary | ICD-10-CM

## 2021-12-21 PROCEDURE — 96372 PR INJECTION,THERAP/PROPH/DIAG2ST, IM OR SUBCUT: ICD-10-PCS | Mod: S$GLB,,, | Performed by: OBSTETRICS & GYNECOLOGY

## 2021-12-21 PROCEDURE — 96372 THER/PROPH/DIAG INJ SC/IM: CPT | Mod: S$GLB,,, | Performed by: OBSTETRICS & GYNECOLOGY

## 2021-12-21 RX ADMIN — TESTOSTERONE CYPIONATE 50 MG: 200 INJECTION, SOLUTION INTRAMUSCULAR at 09:12

## 2022-01-02 ENCOUNTER — PATIENT OUTREACH (OUTPATIENT)
Dept: ADMINISTRATIVE | Facility: OTHER | Age: 47
End: 2022-01-02
Payer: COMMERCIAL

## 2022-01-02 NOTE — PROGRESS NOTES
Care Everywhere: updated  Immunization: no profile in links   Health Maintenance: updated  Media Review:   Legacy Review:   DIS:  Order placed:   Upcoming appts:  EFAX:  Task Tickets:  Referrals:

## 2022-01-03 ENCOUNTER — OFFICE VISIT (OUTPATIENT)
Dept: OBSTETRICS AND GYNECOLOGY | Facility: CLINIC | Age: 47
End: 2022-01-03
Attending: OBSTETRICS & GYNECOLOGY
Payer: COMMERCIAL

## 2022-01-03 VITALS
WEIGHT: 119.06 LBS | BODY MASS INDEX: 23.37 KG/M2 | HEIGHT: 60 IN | DIASTOLIC BLOOD PRESSURE: 70 MMHG | SYSTOLIC BLOOD PRESSURE: 110 MMHG

## 2022-01-03 DIAGNOSIS — L65.9 HAIR THINNING: Primary | ICD-10-CM

## 2022-01-03 DIAGNOSIS — L70.8 OTHER ACNE: ICD-10-CM

## 2022-01-03 DIAGNOSIS — N94.10 DYSPAREUNIA IN FEMALE: ICD-10-CM

## 2022-01-03 DIAGNOSIS — E89.40 SURGICAL MENOPAUSE: ICD-10-CM

## 2022-01-03 PROCEDURE — 3074F PR MOST RECENT SYSTOLIC BLOOD PRESSURE < 130 MM HG: ICD-10-PCS | Mod: CPTII,S$GLB,, | Performed by: OBSTETRICS & GYNECOLOGY

## 2022-01-03 PROCEDURE — 3074F SYST BP LT 130 MM HG: CPT | Mod: CPTII,S$GLB,, | Performed by: OBSTETRICS & GYNECOLOGY

## 2022-01-03 PROCEDURE — 99214 PR OFFICE/OUTPT VISIT, EST, LEVL IV, 30-39 MIN: ICD-10-PCS | Mod: S$GLB,,, | Performed by: OBSTETRICS & GYNECOLOGY

## 2022-01-03 PROCEDURE — 3008F BODY MASS INDEX DOCD: CPT | Mod: CPTII,S$GLB,, | Performed by: OBSTETRICS & GYNECOLOGY

## 2022-01-03 PROCEDURE — 99999 PR PBB SHADOW E&M-EST. PATIENT-LVL II: CPT | Mod: PBBFAC,,, | Performed by: OBSTETRICS & GYNECOLOGY

## 2022-01-03 PROCEDURE — 1159F PR MEDICATION LIST DOCUMENTED IN MEDICAL RECORD: ICD-10-PCS | Mod: CPTII,S$GLB,, | Performed by: OBSTETRICS & GYNECOLOGY

## 2022-01-03 PROCEDURE — 3078F DIAST BP <80 MM HG: CPT | Mod: CPTII,S$GLB,, | Performed by: OBSTETRICS & GYNECOLOGY

## 2022-01-03 PROCEDURE — 1159F MED LIST DOCD IN RCRD: CPT | Mod: CPTII,S$GLB,, | Performed by: OBSTETRICS & GYNECOLOGY

## 2022-01-03 PROCEDURE — 3008F PR BODY MASS INDEX (BMI) DOCUMENTED: ICD-10-PCS | Mod: CPTII,S$GLB,, | Performed by: OBSTETRICS & GYNECOLOGY

## 2022-01-03 PROCEDURE — 3078F PR MOST RECENT DIASTOLIC BLOOD PRESSURE < 80 MM HG: ICD-10-PCS | Mod: CPTII,S$GLB,, | Performed by: OBSTETRICS & GYNECOLOGY

## 2022-01-03 PROCEDURE — 99999 PR PBB SHADOW E&M-EST. PATIENT-LVL II: ICD-10-PCS | Mod: PBBFAC,,, | Performed by: OBSTETRICS & GYNECOLOGY

## 2022-01-03 PROCEDURE — 99214 OFFICE O/P EST MOD 30 MIN: CPT | Mod: S$GLB,,, | Performed by: OBSTETRICS & GYNECOLOGY

## 2022-01-03 RX ORDER — TESTOSTERONE CYPIONATE 200 MG/ML
25 INJECTION, SOLUTION INTRAMUSCULAR
Status: DISCONTINUED | OUTPATIENT
Start: 2022-01-03 | End: 2022-04-11

## 2022-01-03 RX ORDER — ESTRADIOL 10 UG/1
INSERT VAGINAL
Qty: 24 TABLET | Refills: 3 | Status: SHIPPED | OUTPATIENT
Start: 2022-01-03 | End: 2022-04-11

## 2022-01-03 RX ORDER — ESTRADIOL 10 UG/1
INSERT VAGINAL
Qty: 18 TABLET | Refills: 0 | Status: SHIPPED | OUTPATIENT
Start: 2022-01-03 | End: 2022-04-11

## 2022-01-03 RX ORDER — SPIRONOLACTONE 50 MG/1
50 TABLET, FILM COATED ORAL 2 TIMES DAILY
Qty: 60 TABLET | Refills: 11 | Status: SHIPPED | OUTPATIENT
Start: 2022-01-03 | End: 2023-01-03

## 2022-01-03 RX ORDER — ESTRADIOL 2 MG/1
2 TABLET ORAL DAILY
Qty: 90 TABLET | Refills: 3 | Status: SHIPPED | OUTPATIENT
Start: 2022-01-03 | End: 2022-04-11

## 2022-01-03 NOTE — PROGRESS NOTES
Subjective:      Adrienne Andrew is a 46 y.o. female who is here for follow-up of hormone replacement therapy. She is s/p DVH/BSO on 6/23/2021 for severe endometriosis and pelvic pain.  At her last visit on 9/20/21, she said she was taking estradiol 1 mg and progesterone 200 mg RDT.  She was sleeping fine.  She had occasional hot flashes and vaginal dryness.  She had no sex drive.  She had no pain.      PLAN on 9/20/21:  Increase:  Estradiol to 2 mg QAM  Continue progesterone  mg QHS  Start:  Testosterone cypionate 50 mg IM monthly. Discussed FDA warning about DVT  Estradiol vaginal cream    She has no pain.  Sex drive is much better on testosterone, but she has hair thinning, a little facial hair, and acne.  No hot flashes and sleeping well.  She still has vaginal dryness.  The estradiol cream was messy and didn't help. The patient is sexually active.     Lab Visit on 11/16/2021   Component Date Value Ref Range Status    Testosterone, Total 11/16/2021 126* 5 - 73 ng/dL Final    Testosterone, Free 11/16/2021 0.6  pg/mL Final   Lab Visit on 10/05/2021   Component Date Value Ref Range Status    WBC 10/05/2021 4.95  3.90 - 12.70 K/uL Final    RBC 10/05/2021 4.60  4.00 - 5.40 M/uL Final    Hemoglobin 10/05/2021 13.8  12.0 - 16.0 g/dL Final    Hematocrit 10/05/2021 42.1  37.0 - 48.5 % Final    MCV 10/05/2021 92  82 - 98 fL Final    MCH 10/05/2021 30.0  27.0 - 31.0 pg Final    MCHC 10/05/2021 32.8  32.0 - 36.0 g/dL Final    RDW 10/05/2021 13.7  11.5 - 14.5 % Final    Platelets 10/05/2021 255  150 - 450 K/uL Final    MPV 10/05/2021 11.3  9.2 - 12.9 fL Final    Immature Granulocytes 10/05/2021 0.2  0.0 - 0.5 % Final    Gran # (ANC) 10/05/2021 3.2  1.8 - 7.7 K/uL Final    Immature Grans (Abs) 10/05/2021 0.01  0.00 - 0.04 K/uL Final    Lymph # 10/05/2021 1.3  1.0 - 4.8 K/uL Final    Mono # 10/05/2021 0.3  0.3 - 1.0 K/uL Final    Eos # 10/05/2021 0.1  0.0 - 0.5 K/uL Final    Baso # 10/05/2021  0.05  0.00 - 0.20 K/uL Final    nRBC 10/05/2021 0  0 /100 WBC Final    Gran % 10/05/2021 64.4  38.0 - 73.0 % Final    Lymph % 10/05/2021 26.1  18.0 - 48.0 % Final    Mono % 10/05/2021 6.3  4.0 - 15.0 % Final    Eosinophil % 10/05/2021 2.0  0.0 - 8.0 % Final    Basophil % 10/05/2021 1.0  0.0 - 1.9 % Final    Differential Method 10/05/2021 Automated   Final    Sodium 10/05/2021 141  136 - 145 mmol/L Final    Potassium 10/05/2021 4.2  3.5 - 5.1 mmol/L Final    Chloride 10/05/2021 106  95 - 110 mmol/L Final    CO2 10/05/2021 25  23 - 29 mmol/L Final    Glucose 10/05/2021 87  70 - 110 mg/dL Final    BUN 10/05/2021 13  6 - 20 mg/dL Final    Creatinine 10/05/2021 0.8  0.5 - 1.4 mg/dL Final    Calcium 10/05/2021 9.2  8.7 - 10.5 mg/dL Final    Total Protein 10/05/2021 6.7  6.0 - 8.4 g/dL Final    Albumin 10/05/2021 3.9  3.5 - 5.2 g/dL Final    Total Bilirubin 10/05/2021 0.6  0.1 - 1.0 mg/dL Final    Alkaline Phosphatase 10/05/2021 47* 55 - 135 U/L Final    AST 10/05/2021 19  10 - 40 U/L Final    ALT 10/05/2021 18  10 - 44 U/L Final    Anion Gap 10/05/2021 10  8 - 16 mmol/L Final    eGFR if African American 10/05/2021 >60  >60 mL/min/1.73 m^2 Final    eGFR if non African American 10/05/2021 >60  >60 mL/min/1.73 m^2 Final    Hemoglobin A1C 10/05/2021 5.0  4.0 - 5.6 % Final    Estimated Avg Glucose 10/05/2021 97  68 - 131 mg/dL Final    Cholesterol 10/05/2021 135  120 - 199 mg/dL Final    Triglycerides 10/05/2021 62  30 - 150 mg/dL Final    HDL 10/05/2021 63  40 - 75 mg/dL Final    LDL Cholesterol 10/05/2021 59.6* 63.0 - 159.0 mg/dL Final    HDL/Cholesterol Ratio 10/05/2021 46.7  20.0 - 50.0 % Final    Total Cholesterol/HDL Ratio 10/05/2021 2.1  2.0 - 5.0 Final    Non-HDL Cholesterol 10/05/2021 72  mg/dL Final    Vitamin B-12 10/05/2021 732  210 - 950 pg/mL Final    Vit D, 25-Hydroxy 10/05/2021 66  30 - 96 ng/mL Final    TSH 10/05/2021 1.946  0.400 - 4.000 uIU/mL Final       Past Medical  History:   Diagnosis Date    Anemia     History of robot-assisted laparoscopic hysterectomy 06/23/2021    Hormone replacement therapy (HRT)     PONV (postoperative nausea and vomiting)      Past Surgical History:   Procedure Laterality Date    ARTHROSCOPIC DEBRIDEMENT OF SHOULDER Right 1/16/2020    Procedure: PARTIAL THICKNESS CUFF DEBRIDEMENT, SHOULDER, ARTHROSCOPIC;  Surgeon: Charmaine Barr MD;  Location[facility];  Service: Orthopedics;  Laterality: Right;    ARTHROSCOPY OF SHOULDER WITH DECOMPRESSION OF SUBACROMIAL SPACE Right 1/16/2020    Procedure: ARTHROSCOPY, SHOULDER, WITH SUBACROMIAL SPACE DECOMPRESSION EXTENSIVE DEBRIDMENT;  Surgeon: Charmaine Barr MD;  Location: [facility] OR;  Service: Orthopedics;  Laterality: Right;    CYSTOSCOPY N/A 6/23/2021    Procedure: CYSTOSCOPY;  Surgeon: Diana Rock MD;  Location: [facility] OR;  Service: OB/GYN;  Laterality: N/A;    DILATION AND CURETTAGE OF UTERUS      AB    FIXATION OF TENDON Right 1/16/2020    Procedure: FIXATION, TENDON, subpectoral bicep tenodesis;  Surgeon: Charmaine Barr MD;  Location: [facility] OR;  Service: Orthopedics;  Laterality: Right;  FIXATION, TENDON, bicep tenodesis    HYSTERECTOMY  06/23/2021    OOPHORECTOMY  06/23/2021    ROBOT-ASSISTED LAPAROSCOPIC ABDOMINAL HYSTERECTOMY USING DA ANNMARIE XI Bilateral 6/23/2021    Procedure: XI ROBOTIC HYSTERECTOMY;  Surgeon: Diana Rock MD;  Location: [facility];  Service: OB/GYN;  Laterality: Bilateral;    ROBOT-ASSISTED LAPAROSCOPIC LYSIS OF ADHESIONS USING DA ANNMARIE XI  6/23/2021    Procedure: XI ROBOTIC LYSIS, ADHESIONS;  Surgeon: Diana Rock MD;  Location: [facility] OR;  Service: OB/GYN;;    ROBOT-ASSISTED LAPAROSCOPIC SALPINGO-OOPHORECTOMY USING DA ANNMARIE XI Bilateral 6/23/2021    Procedure: XI ROBOTIC SALPINGO-OOPHORECTOMY;  Surgeon: Diana Rock MD;  Location: [facility] OR;  Service: OB/GYN;  Laterality: Bilateral;    VAGINAL DELIVERY  2004, 2006, 2009    Boy/Boy/Girl     WISDOM TOOTH EXTRACTION        Social History     Tobacco Use    Smoking status: Never Smoker    Smokeless tobacco: Never Used   Substance Use Topics    Alcohol use: Not Currently     Alcohol/week: 0.0 standard drinks     Comment: socially    Drug use: No     Family History   Problem Relation Age of Onset    Hyperthyroidism Mother     No Known Problems Brother     No Known Problems Brother     No Known Problems Brother     Amblyopia Neg Hx     Blindness Neg Hx     Cancer Neg Hx     Diabetes Neg Hx     Cataracts Neg Hx     Glaucoma Neg Hx     Hypertension Neg Hx     Macular degeneration Neg Hx     Retinal detachment Neg Hx     Strabismus Neg Hx     Stroke Neg Hx     Thyroid disease Neg Hx     Celiac disease Neg Hx     Colon cancer Neg Hx     Crohn's disease Neg Hx     Breast cancer Neg Hx     Ovarian cancer Neg Hx      OB History    Para Term  AB Living   4 3 3   1 3   SAB IAB Ectopic Multiple Live Births   1       3      # Outcome Date GA Lbr Sincere/2nd Weight Sex Delivery Anes PTL Lv   4 Term  40w0d  3.289 kg (7 lb 4 oz) F Vag-Spont   DANILO   3 Term  40w0d  3.175 kg (7 lb) M Vag-Spont   DANILO   2 Term  40w0d  2.948 kg (6 lb 8 oz) M Vag-Spont   DANILO   1 SAB               Obstetric Comments   Menarche ~ 14       Current Outpatient Medications:     COMPOUND HORMONE REPLACEMENT, Take by mouth once daily. PROGESTERONE  (MED QUEST), Disp: , Rfl:     amitriptyline (ELAVIL) 10 MG tablet, Take 1 tablet (10 mg total) by mouth nightly as needed for Insomnia. (Patient not taking: Reported on 1/3/2022), Disp: 30 tablet, Rfl: 3    estradioL (ESTRACE) 2 MG tablet, Take 1 tablet (2 mg total) by mouth once daily., Disp: 90 tablet, Rfl: 3    estradioL (VAGIFEM) 10 mcg Tab, Place 1 tablet vaginally x 2 weeks and then 1 tablet vaginally twice weekly from then on., Disp: 18 tablet, Rfl: 0    estradioL (VAGIFEM) 10 mcg Tab, Insert 1 tablet vaginally twice weekly, Disp: 24 tablet, Rfl: 3    spironolactone  (ALDACTONE) 50 MG tablet, Take 1 tablet (50 mg total) by mouth 2 (two) times daily., Disp: 60 tablet, Rfl: 11    Current Facility-Administered Medications:     testosterone cypionate injection 26 mg, 26 mg, Intramuscular, Q28 Days, Diana Rock MD    Vitals:    01/03/22 0903   BP: 110/70   Weight: 54 kg (119 lb 0.8 oz)   Height: 5' (1.524 m)   PainSc: 0-No pain     Body mass index is 23.25 kg/m².       Assessment:    Hair thinning  -     spironolactone (ALDACTONE) 50 MG tablet; Take 1 tablet (50 mg total) by mouth 2 (two) times daily.  Dispense: 60 tablet; Refill: 11    Other acne  -     spironolactone (ALDACTONE) 50 MG tablet; Take 1 tablet (50 mg total) by mouth 2 (two) times daily.  Dispense: 60 tablet; Refill: 11    Surgical menopause  -     testosterone cypionate injection 26 mg  -     Cancel: Testosterone, Free; Future; Expected date: 01/03/2022  -     Cancel: Testosterone; Future; Expected date: 01/03/2022  -     Prior authorization Order  -     estradioL (ESTRACE) 2 MG tablet; Take 1 tablet (2 mg total) by mouth once daily.  Dispense: 90 tablet; Refill: 3  -     Testosterone; Future; Expected date: 01/03/2022  -     Testosterone, Free; Future; Expected date: 01/03/2022    Dyspareunia in female  -     estradioL (VAGIFEM) 10 mcg Tab; Place 1 tablet vaginally x 2 weeks and then 1 tablet vaginally twice weekly from then on.  Dispense: 18 tablet; Refill: 0  -     estradioL (VAGIFEM) 10 mcg Tab; Insert 1 tablet vaginally twice weekly  Dispense: 24 tablet; Refill: 3        Plan:   Risks and benefits of hormone replacement therapy were discussed.  Hormone replacement therapy options, including bioidentical versus non-bioidentical hormones, as well as alternatives discussed.  Alllabs above reviewed  Continue:  Estradiol to 2 mg QAM  Continue progesterone  mg QHS  Decrease:  Testosterone cypionate to 26 mg Im monthly.  Patient is aware this is off-label use in women, and FDA black box warnings were  reviewed.    Start:  Vagifem - If too expensive, she will send me a list  Spironolactone 50 mg BID    Recheck free and total testosterone levels 3 weeks after 2nd shot at new dose.    Follow up in 3 months.  Instructed patient to call if she experiences any side effects or has any questions.

## 2022-01-20 ENCOUNTER — CLINICAL SUPPORT (OUTPATIENT)
Dept: OBSTETRICS AND GYNECOLOGY | Facility: CLINIC | Age: 47
End: 2022-01-20
Payer: COMMERCIAL

## 2022-01-20 DIAGNOSIS — E89.40 SURGICAL MENOPAUSE: Primary | ICD-10-CM

## 2022-01-20 PROCEDURE — 99999 PR PBB SHADOW E&M-EST. PATIENT-LVL I: ICD-10-PCS | Mod: PBBFAC,,,

## 2022-01-20 PROCEDURE — 96372 THER/PROPH/DIAG INJ SC/IM: CPT | Mod: S$GLB,,, | Performed by: OBSTETRICS & GYNECOLOGY

## 2022-01-20 PROCEDURE — 96372 PR INJECTION,THERAP/PROPH/DIAG2ST, IM OR SUBCUT: ICD-10-PCS | Mod: S$GLB,,, | Performed by: OBSTETRICS & GYNECOLOGY

## 2022-01-20 PROCEDURE — 99999 PR PBB SHADOW E&M-EST. PATIENT-LVL I: CPT | Mod: PBBFAC,,,

## 2022-01-20 RX ADMIN — TESTOSTERONE CYPIONATE 26 MG: 200 INJECTION, SOLUTION INTRAMUSCULAR at 11:01

## 2022-01-20 NOTE — PROGRESS NOTES
...Ordering Provider  Dr. TAYLOR Rock       01/20/22 Pt administered # 1 26 mg of testosterone to the left upper outer glut. Pt tolerated well. Pt instructed next injection is due on 2/17/22. Pt verbalizes understanding.       Pain Before:  None    Pain After: None    Patient Complaints: None

## 2022-02-02 RX ORDER — AMOXICILLIN AND CLAVULANATE POTASSIUM 875; 125 MG/1; MG/1
1 TABLET, FILM COATED ORAL EVERY 12 HOURS
Qty: 10 TABLET | Refills: 0 | Status: SHIPPED | OUTPATIENT
Start: 2022-02-02 | End: 2022-02-07

## 2022-02-17 ENCOUNTER — CLINICAL SUPPORT (OUTPATIENT)
Dept: OBSTETRICS AND GYNECOLOGY | Facility: CLINIC | Age: 47
End: 2022-02-17
Payer: COMMERCIAL

## 2022-02-17 DIAGNOSIS — E89.40 SURGICAL MENOPAUSE: Primary | ICD-10-CM

## 2022-02-17 PROCEDURE — 99999 PR PBB SHADOW E&M-EST. PATIENT-LVL I: ICD-10-PCS | Mod: PBBFAC,,,

## 2022-02-17 PROCEDURE — 99999 PR PBB SHADOW E&M-EST. PATIENT-LVL I: CPT | Mod: PBBFAC,,,

## 2022-02-17 PROCEDURE — 96372 THER/PROPH/DIAG INJ SC/IM: CPT | Mod: PBBFAC,PN

## 2022-02-17 RX ADMIN — TESTOSTERONE CYPIONATE 26 MG: 200 INJECTION, SOLUTION INTRAMUSCULAR at 11:02

## 2022-02-17 NOTE — PROGRESS NOTES
...Ordering Provider  Dr. TAYLOR Rock       2/17/22 Pt administered #2 26 mg of testosterone to the rIGHT upper outer glut. Pt tolerated well. Pt instructed next injection is due on 3/14/22 with labs scheduled for 3/7/22. Pt verbalizes understanding.       Pain Before:  None    Pain After: None    Patient Complaints: None

## 2022-03-10 ENCOUNTER — LAB VISIT (OUTPATIENT)
Dept: LAB | Facility: OTHER | Age: 47
End: 2022-03-10
Attending: OBSTETRICS & GYNECOLOGY
Payer: COMMERCIAL

## 2022-03-10 DIAGNOSIS — E89.40 SURGICAL MENOPAUSE: ICD-10-CM

## 2022-03-10 LAB — TESTOST SERPL-MCNC: 39 NG/DL (ref 5–73)

## 2022-03-10 PROCEDURE — 84403 ASSAY OF TOTAL TESTOSTERONE: CPT | Performed by: OBSTETRICS & GYNECOLOGY

## 2022-03-10 PROCEDURE — 84402 ASSAY OF FREE TESTOSTERONE: CPT | Performed by: OBSTETRICS & GYNECOLOGY

## 2022-03-10 PROCEDURE — 36415 COLL VENOUS BLD VENIPUNCTURE: CPT | Performed by: OBSTETRICS & GYNECOLOGY

## 2022-03-14 ENCOUNTER — CLINICAL SUPPORT (OUTPATIENT)
Dept: OBSTETRICS AND GYNECOLOGY | Facility: CLINIC | Age: 47
End: 2022-03-14
Payer: COMMERCIAL

## 2022-03-14 DIAGNOSIS — Z12.31 OTHER SCREENING MAMMOGRAM: ICD-10-CM

## 2022-03-14 DIAGNOSIS — E89.40 SURGICAL MENOPAUSE: Primary | ICD-10-CM

## 2022-03-14 PROCEDURE — 96372 THER/PROPH/DIAG INJ SC/IM: CPT | Mod: S$GLB,,, | Performed by: OBSTETRICS & GYNECOLOGY

## 2022-03-14 PROCEDURE — 99999 PR PBB SHADOW E&M-EST. PATIENT-LVL I: CPT | Mod: PBBFAC,,,

## 2022-03-14 PROCEDURE — 99999 PR PBB SHADOW E&M-EST. PATIENT-LVL I: ICD-10-PCS | Mod: PBBFAC,,,

## 2022-03-14 PROCEDURE — 96372 PR INJECTION,THERAP/PROPH/DIAG2ST, IM OR SUBCUT: ICD-10-PCS | Mod: S$GLB,,, | Performed by: OBSTETRICS & GYNECOLOGY

## 2022-03-14 RX ADMIN — TESTOSTERONE CYPIONATE 26 MG: 200 INJECTION, SOLUTION INTRAMUSCULAR at 11:03

## 2022-03-14 NOTE — PROGRESS NOTES
...Ordering Provider  Dr. TAYLOR Rock       3/14/22 Pt administered 26 mg of testosterone to the left upper outer glut. Pt tolerated well. Pt instructed next injection is due on 4/12/22. Pt verbalizes understanding.       Pain Before:  None    Pain After: None    Patient Complaints: None

## 2022-03-15 ENCOUNTER — PATIENT MESSAGE (OUTPATIENT)
Dept: OBSTETRICS AND GYNECOLOGY | Facility: CLINIC | Age: 47
End: 2022-03-15
Payer: COMMERCIAL

## 2022-03-15 LAB — TESTOST FREE SERPL-MCNC: 0.9 PG/ML

## 2022-03-16 ENCOUNTER — PATIENT MESSAGE (OUTPATIENT)
Dept: ADMINISTRATIVE | Facility: HOSPITAL | Age: 47
End: 2022-03-16
Payer: COMMERCIAL

## 2022-03-17 ENCOUNTER — OFFICE VISIT (OUTPATIENT)
Dept: OPTOMETRY | Facility: CLINIC | Age: 47
End: 2022-03-17
Payer: COMMERCIAL

## 2022-03-17 DIAGNOSIS — Z46.0 FITTING AND ADJUSTMENT OF SPECTACLES AND CONTACT LENSES: Primary | ICD-10-CM

## 2022-03-17 DIAGNOSIS — H52.13 MYOPIA OF BOTH EYES: Primary | ICD-10-CM

## 2022-03-17 PROCEDURE — 99999 PR PBB SHADOW E&M-EST. PATIENT-LVL III: ICD-10-PCS | Mod: PBBFAC,,, | Performed by: OPTOMETRIST

## 2022-03-17 PROCEDURE — 92015 PR REFRACTION: ICD-10-PCS | Mod: S$GLB,,, | Performed by: OPTOMETRIST

## 2022-03-17 PROCEDURE — 92014 PR EYE EXAM, EST PATIENT,COMPREHESV: ICD-10-PCS | Mod: S$GLB,,, | Performed by: OPTOMETRIST

## 2022-03-17 PROCEDURE — 92310 PR CONTACT LENS FITTING (NO CHANGE): ICD-10-PCS | Mod: S$GLB,,, | Performed by: OPTOMETRIST

## 2022-03-17 PROCEDURE — 92310 CONTACT LENS FITTING OU: CPT | Mod: S$GLB,,, | Performed by: OPTOMETRIST

## 2022-03-17 PROCEDURE — 92015 DETERMINE REFRACTIVE STATE: CPT | Mod: S$GLB,,, | Performed by: OPTOMETRIST

## 2022-03-17 PROCEDURE — 92014 COMPRE OPH EXAM EST PT 1/>: CPT | Mod: S$GLB,,, | Performed by: OPTOMETRIST

## 2022-03-17 PROCEDURE — 99999 PR PBB SHADOW E&M-EST. PATIENT-LVL III: CPT | Mod: PBBFAC,,, | Performed by: OPTOMETRIST

## 2022-03-17 NOTE — PROGRESS NOTES
HPI     Adrienne Andrew is a 46 y.o. female who returns  for continued eye   care. She was last seen 12/14/2020 .  She has bilateral myopia for which   Acuvue Marilu monthly replacement contact lenses are her primary mode of   visual correction. Today, she reports that she has not noticed any new or   concerning ocular or visual symptoms.      (--)blurred vision  (--)Headaches  (--)diplopia  (--)flashes  (--)floaters  (--)pain  (--)Itching  (--)tearing  (--)burning  (--)Dryness  (--) OTC Drops  (--)Photophobia         Last edited by Jung Fuentes, OD on 3/17/2022  5:50 PM. (History)        Review of Systems   Constitutional: Negative for chills, fever and malaise/fatigue.   HENT: Negative for congestion, hearing loss and sore throat.    Eyes: Negative for blurred vision, double vision, photophobia, pain, discharge and redness.   Respiratory: Negative.  Negative for cough, shortness of breath and wheezing.    Cardiovascular: Negative.    Gastrointestinal: Negative.  Negative for nausea and vomiting.   Genitourinary: Negative.    Musculoskeletal: Negative.    Skin: Negative.    Neurological: Negative for seizures.   Psychiatric/Behavioral: Negative.        For exam results, see encounter report    Assessment /Plan     1. Mild, Bilateral Myopia --> stable  - Spec Rx per final Rx below  Glasses Prescription (3/17/2022)        Sphere Cylinder Dist [place]    Right -1.25 Sphere 20/20    Left -1.25 Sphere 20/20    Type: SVL    Expiration Date: 3/17/2023        - CLRx per below for 1 month disposal/replacement    -Advised against overnight wear, risks of overnight wear explained;     -Systane Balance, Soothe XP, Refresh Optive Advanced artificial tears for comfort prn;    -Clear Care or Optifree solutions recommended    Contact Lens Prescription (3/17/2022)        Brand Base Curve Diameter Sphere    Right Acuvue Marilu 8.4 14.0 -1.25    Left Acuvue Marilu 8.4 14.0 -1.25    Expiration Date: 3/17/2023    Replacement: Monthly     Solutions: OptiFree PureMoist    Wearing Schedule: Daily Wear        2. Good ocular health    Patient education; RTC in 1 year, sooner as needed at [facility]

## 2022-04-11 ENCOUNTER — OFFICE VISIT (OUTPATIENT)
Dept: OBSTETRICS AND GYNECOLOGY | Facility: CLINIC | Age: 47
End: 2022-04-11
Attending: OBSTETRICS & GYNECOLOGY
Payer: COMMERCIAL

## 2022-04-11 VITALS — HEIGHT: 60 IN | BODY MASS INDEX: 23.3 KG/M2 | WEIGHT: 118.69 LBS

## 2022-04-11 DIAGNOSIS — Z12.31 VISIT FOR SCREENING MAMMOGRAM: ICD-10-CM

## 2022-04-11 DIAGNOSIS — R53.83 FATIGUE, UNSPECIFIED TYPE: ICD-10-CM

## 2022-04-11 DIAGNOSIS — E89.40 SURGICAL MENOPAUSE: Primary | ICD-10-CM

## 2022-04-11 DIAGNOSIS — Z01.419 ENCOUNTER FOR GYNECOLOGICAL EXAMINATION: ICD-10-CM

## 2022-04-11 PROCEDURE — 99396 PREV VISIT EST AGE 40-64: CPT | Mod: S$GLB,,, | Performed by: OBSTETRICS & GYNECOLOGY

## 2022-04-11 PROCEDURE — 99999 PR PBB SHADOW E&M-EST. PATIENT-LVL III: ICD-10-PCS | Mod: PBBFAC,,, | Performed by: OBSTETRICS & GYNECOLOGY

## 2022-04-11 PROCEDURE — 1159F MED LIST DOCD IN RCRD: CPT | Mod: CPTII,S$GLB,, | Performed by: OBSTETRICS & GYNECOLOGY

## 2022-04-11 PROCEDURE — 99396 PR PREVENTIVE VISIT,EST,40-64: ICD-10-PCS | Mod: S$GLB,,, | Performed by: OBSTETRICS & GYNECOLOGY

## 2022-04-11 PROCEDURE — 3008F PR BODY MASS INDEX (BMI) DOCUMENTED: ICD-10-PCS | Mod: CPTII,S$GLB,, | Performed by: OBSTETRICS & GYNECOLOGY

## 2022-04-11 PROCEDURE — 99999 PR PBB SHADOW E&M-EST. PATIENT-LVL III: CPT | Mod: PBBFAC,,, | Performed by: OBSTETRICS & GYNECOLOGY

## 2022-04-11 PROCEDURE — 3008F BODY MASS INDEX DOCD: CPT | Mod: CPTII,S$GLB,, | Performed by: OBSTETRICS & GYNECOLOGY

## 2022-04-11 PROCEDURE — 1159F PR MEDICATION LIST DOCUMENTED IN MEDICAL RECORD: ICD-10-PCS | Mod: CPTII,S$GLB,, | Performed by: OBSTETRICS & GYNECOLOGY

## 2022-04-11 RX ORDER — ESTRADIOL 2 MG/1
2 TABLET ORAL EVERY MORNING
Qty: 90 TABLET | Refills: 3 | Status: SHIPPED | OUTPATIENT
Start: 2022-04-11 | End: 2022-12-12

## 2022-04-11 RX ORDER — TESTOSTERONE CYPIONATE 200 MG/ML
38 INJECTION, SOLUTION INTRAMUSCULAR
Status: DISCONTINUED | OUTPATIENT
Start: 2022-04-11 | End: 2022-06-08

## 2022-04-11 NOTE — PROGRESS NOTES
Subjective:       Patient ID: Adrienne Andrew is a 46 y.o. female.    Chief Complaint:  Well Woman (Last pap 2021 normal, mmg 2021 francheska 1/Hormone follow up )      History of Present Illness.  Adrienne Andrew is a 46 y.o. female.  She has no breast or urinary symptoms.  She has no postcoital bleeding, dysmenorrhea, pelvic pain, dyspareunia, vaginal dryness, vaginal discharge, or sexual complaints.  Her only complaint is fatigue.  Mom and grandma have thyroid disease.    Current HRT:  Estradiol 2 mg PO QAM  Progesterone  mg QHS  Testosterone cypionate 26 mg IM  Vagifem  Spironolactone 50 mg BID    GYN & OB History  Patient's last menstrual period was 2021.   Last Pap: 2021 Normal  Last mammogram: 2021 Birads 1    OB History    Para Term  AB Living   4 3 3   1 3   SAB IAB Ectopic Multiple Live Births   1       3      # Outcome Date GA Lbr Sincere/2nd Weight Sex Delivery Anes PTL Lv   4 Term  40w0d  3.289 kg (7 lb 4 oz) F Vag-Spont   DANILO   3 Term  40w0d  3.175 kg (7 lb) M Vag-Spont   DANILO   2 Term  40w0d  2.948 kg (6 lb 8 oz) M Vag-Spont   DANILO   1 SAB               Obstetric Comments   Menarche ~ 14       Past Medical History:   Diagnosis Date    Anemia     History of robot-assisted laparoscopic hysterectomy 2021    Hormone replacement therapy (HRT)     PONV (postoperative nausea and vomiting)      Past Surgical History:   Procedure Laterality Date    ARTHROSCOPIC DEBRIDEMENT OF SHOULDER Right 2020    Procedure: PARTIAL THICKNESS CUFF DEBRIDEMENT, SHOULDER, ARTHROSCOPIC;  Surgeon: Charmaine Barr MD;  Location: [facility] OR;  Service: Orthopedics;  Laterality: Right;    ARTHROSCOPY OF SHOULDER WITH DECOMPRESSION OF SUBACROMIAL SPACE Right 2020    Procedure: ARTHROSCOPY, SHOULDER, WITH SUBACROMIAL SPACE DECOMPRESSION EXTENSIVE DEBRIDMENT;  Surgeon: Charmaine Barr MD;  Location: [facility] OR;  Service: Orthopedics;  Laterality: Right;    CYSTOSCOPY N/A  6/23/2021    Procedure: CYSTOSCOPY;  Surgeon: Diana Rock MD;  Location: [facility] OR;  Service: OB/GYN;  Laterality: N/A;    DILATION AND CURETTAGE OF UTERUS      AB    FIXATION OF TENDON Right 1/16/2020    Procedure: FIXATION, TENDON, subpectoral bicep tenodesis;  Surgeon: Charmaine Barr MD;  Location: [facility] OR;  Service: Orthopedics;  Laterality: Right;  FIXATION, TENDON, bicep tenodesis    HYSTERECTOMY  06/23/2021    OOPHORECTOMY  06/23/2021    ROBOT-ASSISTED LAPAROSCOPIC ABDOMINAL HYSTERECTOMY USING DA ANNMARIE XI Bilateral 6/23/2021    Procedure: XI ROBOTIC HYSTERECTOMY;  Surgeon: Diana Rock MD;  Location: [facility] OR;  Service: OB/GYN;  Laterality: Bilateral;    ROBOT-ASSISTED LAPAROSCOPIC LYSIS OF ADHESIONS USING DA ANNMARIE XI  6/23/2021    Procedure: XI ROBOTIC LYSIS, ADHESIONS;  Surgeon: Diana Rock MD;  Location[facility];  Service: OB/GYN;;    ROBOT-ASSISTED LAPAROSCOPIC SALPINGO-OOPHORECTOMY USING DA ANNMARIE XI Bilateral 6/23/2021    Procedure: XI ROBOTIC SALPINGO-OOPHORECTOMY;  Surgeon: Diana Rock MD;  Location: [facility] OR;  Service: OB/GYN;  Laterality: Bilateral;    VAGINAL DELIVERY  2004, 2006, 2009    Boy/Boy/Girl     WISDOM TOOTH EXTRACTION       Family History   Problem Relation Age of Onset    Hyperthyroidism Mother     No Known Problems Brother     No Known Problems Brother     No Known Problems Brother     Amblyopia Neg Hx     Blindness Neg Hx     Cancer Neg Hx     Diabetes Neg Hx     Cataracts Neg Hx     Glaucoma Neg Hx     Hypertension Neg Hx     Macular degeneration Neg Hx     Retinal detachment Neg Hx     Strabismus Neg Hx     Stroke Neg Hx     Thyroid disease Neg Hx     Celiac disease Neg Hx     Colon cancer Neg Hx     Crohn's disease Neg Hx     Breast cancer Neg Hx     Ovarian cancer Neg Hx      Social History     Tobacco Use    Smoking status: Never Smoker    Smokeless tobacco: Never Used   Substance Use Topics    Alcohol use: Not Currently      Alcohol/week: 0.0 standard drinks     Comment: socially    Drug use: No       Current Outpatient Medications:     COMPOUND HORMONE REPLACEMENT, Take by mouth once daily. PROGESTERONE  (MED [facility]), Disp: , Rfl:     spironolactone (ALDACTONE) 50 MG tablet, Take 1 tablet (50 mg total) by mouth 2 (two) times daily., Disp: 60 tablet, Rfl: 11    amitriptyline (ELAVIL) 10 MG tablet, Take 1 tablet (10 mg total) by mouth nightly as needed for Insomnia. (Patient not taking: No sig reported), Disp: 30 tablet, Rfl: 3    estradioL (ESTRACE) 2 MG tablet, Take 1 tablet (2 mg total) by mouth every morning., Disp: 90 tablet, Rfl: 3    Current Facility-Administered Medications:     testosterone cypionate injection 38 mg, 38 mg, Intramuscular, Q28 Days, Diana Rock MD    Review of patient's allergies indicates:   Allergen Reactions    Percocet [oxycodone-acetaminophen] Nausea And Vomiting     Has not tried antiemetic with narcotics.        Review of Systems  Review of Systems   Constitutional: Negative for fatigue.   HENT: Negative for trouble swallowing.    Eyes: Negative for visual disturbance.   Respiratory: Negative for cough and shortness of breath.    Cardiovascular: Negative for chest pain.   Gastrointestinal: Negative for abdominal distention, abdominal pain, blood in stool, nausea and vomiting.   Genitourinary: Negative for difficulty urinating, dyspareunia, dysuria, flank pain, frequency, hematuria, pelvic pain, urgency, vaginal bleeding, vaginal discharge and vaginal pain.   Musculoskeletal: Negative for arthralgias.   Skin: Negative for rash.   Neurological: Negative for dizziness and headaches.   Psychiatric/Behavioral: Negative for sleep disturbance. The patient is not nervous/anxious.         Objective:     Vitals:    04/11/22 0930   Weight: 53.8 kg (118 lb 11.5 oz)   Height: 5' (1.524 m)     Body mass index is 23.19 kg/m².    Physical Exam:   Constitutional: She is oriented to person, place, and time.  Vital signs are normal. She appears well-developed and well-nourished.    HENT:   Head: Normocephalic.     Neck: No thyromegaly present.     Pulmonary/Chest: Right breast exhibits no mass, no nipple discharge, no skin change, no tenderness and no swelling. Left breast exhibits no mass, no nipple discharge, no skin change, no tenderness and no swelling. Breasts are symmetrical.        Abdominal: Soft. Bowel sounds are normal. She exhibits no distension. There is no abdominal tenderness.     Genitourinary:    Vagina and rectum normal.   Rectum:      Guaiac result negative.   Guaiac negative stool.    Pelvic exam was performed with patient supine.   There is no rash, tenderness, lesion or injury on the right labia. There is no rash, tenderness, lesion or injury on the left labia. Right adnexum displays no mass, no tenderness and no fullness. Left adnexum displays no mass, no tenderness and no fullness. No erythema or  no vaginal discharge in the vagina. Cervix is absent.Uterus is absent.           Musculoskeletal: Normal range of motion.      Lymphadenopathy:        Right: No supraclavicular adenopathy present.        Left: No supraclavicular adenopathy present.    Neurological: She is alert and oriented to person, place, and time.    Skin: Skin is warm and dry.    Psychiatric: She has a normal mood and affect.        Assessment/ Plan:   Surgical menopause  -     testosterone cypionate injection 38 mg  -     Testosterone; Future; Expected date: 04/11/2022  -     Testosterone, Free; Future; Expected date: 04/11/2022  -     estradioL (ESTRACE) 2 MG tablet; Take 1 tablet (2 mg total) by mouth every morning.  Dispense: 90 tablet; Refill: 3  -     DXA Bone Density Spine And Hip; Future; Expected date: 04/11/2022    Encounter for gynecological examination    Visit for screening mammogram  -     Mammo Digital Screening Bilat w/ [place]; Future; Expected date: 04/11/2022    Fatigue, unspecified type  -     CBC Auto Differential;  Future  -     TSH; Future; Expected date: 04/11/2022      Self breast exam and routine mammography discussed.  Diet and exercise discussed.  Contraception reviewed/discussed.  Follow-up with me in 6 months

## 2022-04-12 ENCOUNTER — CLINICAL SUPPORT (OUTPATIENT)
Dept: OBSTETRICS AND GYNECOLOGY | Facility: CLINIC | Age: 47
End: 2022-04-12
Payer: COMMERCIAL

## 2022-04-12 DIAGNOSIS — E89.40 SURGICAL MENOPAUSE: Primary | ICD-10-CM

## 2022-04-12 PROCEDURE — 99999 PR PBB SHADOW E&M-EST. PATIENT-LVL I: CPT | Mod: PBBFAC,,,

## 2022-04-12 PROCEDURE — 99999 PR PBB SHADOW E&M-EST. PATIENT-LVL I: ICD-10-PCS | Mod: PBBFAC,,,

## 2022-04-12 PROCEDURE — 96372 PR INJECTION,THERAP/PROPH/DIAG2ST, IM OR SUBCUT: ICD-10-PCS | Mod: S$GLB,,, | Performed by: OBSTETRICS & GYNECOLOGY

## 2022-04-12 PROCEDURE — 96372 THER/PROPH/DIAG INJ SC/IM: CPT | Mod: S$GLB,,, | Performed by: OBSTETRICS & GYNECOLOGY

## 2022-04-12 RX ADMIN — TESTOSTERONE CYPIONATE 38 MG: 200 INJECTION, SOLUTION INTRAMUSCULAR at 10:04

## 2022-04-12 NOTE — PROGRESS NOTES
...Ordering Provider  Dr. TAYLOR Rock       4/12/22 Pt administered #1 38 mg of testosterone to the right upper outer glut. Pt tolerated well. Pt instructed next injection is due on 5/10/22. Pt verbalizes understanding.       Pain Before:  None    Pain After: None    Patient Complaints: None

## 2022-05-05 ENCOUNTER — PATIENT MESSAGE (OUTPATIENT)
Dept: OBSTETRICS AND GYNECOLOGY | Facility: CLINIC | Age: 47
End: 2022-05-05
Payer: COMMERCIAL

## 2022-05-10 ENCOUNTER — CLINICAL SUPPORT (OUTPATIENT)
Dept: OBSTETRICS AND GYNECOLOGY | Facility: CLINIC | Age: 47
End: 2022-05-10
Payer: COMMERCIAL

## 2022-05-10 DIAGNOSIS — E89.40 SURGICAL MENOPAUSE: Primary | ICD-10-CM

## 2022-05-10 PROCEDURE — 96372 THER/PROPH/DIAG INJ SC/IM: CPT | Mod: S$GLB,,, | Performed by: OBSTETRICS & GYNECOLOGY

## 2022-05-10 PROCEDURE — 96372 PR INJECTION,THERAP/PROPH/DIAG2ST, IM OR SUBCUT: ICD-10-PCS | Mod: S$GLB,,, | Performed by: OBSTETRICS & GYNECOLOGY

## 2022-05-10 PROCEDURE — 99999 PR PBB SHADOW E&M-EST. PATIENT-LVL I: ICD-10-PCS | Mod: PBBFAC,,,

## 2022-05-10 PROCEDURE — 99999 PR PBB SHADOW E&M-EST. PATIENT-LVL I: CPT | Mod: PBBFAC,,,

## 2022-05-10 RX ADMIN — TESTOSTERONE CYPIONATE 38 MG: 200 INJECTION, SOLUTION INTRAMUSCULAR at 10:05

## 2022-05-10 NOTE — PROGRESS NOTES
...Ordering Provider  Dr. TAYLOR Rock       5/10/22 Pt administered #2 38 mg of testosterone to the left upper outer glut. Pt tolerated well. Pt instructed next injection is due on 6/7/22 ( with labs scheduled for 5/31/22 test levels). Pt verbalizes understanding.       Pain Before:  None    Pain After: None    Patient Complaints: None

## 2022-05-16 ENCOUNTER — PATIENT MESSAGE (OUTPATIENT)
Dept: OBSTETRICS AND GYNECOLOGY | Facility: CLINIC | Age: 47
End: 2022-05-16
Payer: COMMERCIAL

## 2022-05-16 DIAGNOSIS — E89.40 SURGICAL MENOPAUSE: Primary | ICD-10-CM

## 2022-05-16 RX ORDER — TESTOSTERONE CYPIONATE 200 MG/ML
38 INJECTION, SOLUTION INTRAMUSCULAR
Qty: 1 ML | Refills: 0 | Status: SHIPPED | OUTPATIENT
Start: 2022-05-16 | End: 2022-06-08

## 2022-05-17 RX ORDER — VALACYCLOVIR HYDROCHLORIDE 500 MG/1
500 TABLET, FILM COATED ORAL 2 TIMES DAILY
Qty: 6 TABLET | Refills: 3 | Status: SHIPPED | OUTPATIENT
Start: 2022-05-17 | End: 2022-05-20

## 2022-05-18 ENCOUNTER — TELEPHONE (OUTPATIENT)
Dept: PHARMACY | Facility: CLINIC | Age: 47
End: 2022-05-18
Payer: COMMERCIAL

## 2022-05-25 ENCOUNTER — HOSPITAL ENCOUNTER (OUTPATIENT)
Dept: RADIOLOGY | Facility: OTHER | Age: 47
Discharge: HOME | End: 2022-05-25
Attending: OBSTETRICS & GYNECOLOGY
Payer: COMMERCIAL

## 2022-05-25 DIAGNOSIS — Z12.31 VISIT FOR SCREENING MAMMOGRAM: ICD-10-CM

## 2022-05-25 DIAGNOSIS — E89.40 SURGICAL MENOPAUSE: ICD-10-CM

## 2022-05-25 PROCEDURE — 77063 BREAST TOMOSYNTHESIS BI: CPT | Mod: 26,,, | Performed by: RADIOLOGY

## 2022-05-25 PROCEDURE — 77080 DXA BONE DENSITY AXIAL: CPT | Mod: TC

## 2022-05-25 PROCEDURE — 77080 DXA BONE DENSITY AXIAL: CPT | Mod: 26,,, | Performed by: RADIOLOGY

## 2022-05-25 PROCEDURE — 77067 SCR MAMMO BI INCL CAD: CPT | Mod: 26,,, | Performed by: RADIOLOGY

## 2022-05-25 PROCEDURE — 77080 DEXA BONE DENSITY SPINE HIP: ICD-10-PCS | Mod: 26,,, | Performed by: RADIOLOGY

## 2022-05-25 PROCEDURE — 77063 BREAST TOMOSYNTHESIS BI: CPT | Mod: TC

## 2022-05-25 PROCEDURE — 77063 MAMMO DIGITAL SCREENING BILAT WITH TOMO: ICD-10-PCS | Mod: 26,,, | Performed by: RADIOLOGY

## 2022-05-25 PROCEDURE — 77067 MAMMO DIGITAL SCREENING BILAT WITH TOMO: ICD-10-PCS | Mod: 26,,, | Performed by: RADIOLOGY

## 2022-05-31 ENCOUNTER — LAB VISIT (OUTPATIENT)
Dept: LAB | Facility: OTHER | Age: 47
End: 2022-05-31
Attending: OBSTETRICS & GYNECOLOGY
Payer: COMMERCIAL

## 2022-05-31 DIAGNOSIS — E89.40 SURGICAL MENOPAUSE: ICD-10-CM

## 2022-05-31 DIAGNOSIS — R53.83 FATIGUE, UNSPECIFIED TYPE: ICD-10-CM

## 2022-05-31 LAB
BASOPHILS # BLD AUTO: 0.02 K/UL (ref 0–0.2)
BASOPHILS NFR BLD: 0.4 % (ref 0–1.9)
DIFFERENTIAL METHOD: ABNORMAL
EOSINOPHIL # BLD AUTO: 0.2 K/UL (ref 0–0.5)
EOSINOPHIL NFR BLD: 3.3 % (ref 0–8)
ERYTHROCYTE [DISTWIDTH] IN BLOOD BY AUTOMATED COUNT: 12.9 % (ref 11.5–14.5)
HCT VFR BLD AUTO: 42.9 % (ref 37–48.5)
HGB BLD-MCNC: 14.7 G/DL (ref 12–16)
IMM GRANULOCYTES # BLD AUTO: 0.01 K/UL (ref 0–0.04)
IMM GRANULOCYTES NFR BLD AUTO: 0.2 % (ref 0–0.5)
LYMPHOCYTES # BLD AUTO: 1.1 K/UL (ref 1–4.8)
LYMPHOCYTES NFR BLD: 24 % (ref 18–48)
MCH RBC QN AUTO: 31.7 PG (ref 27–31)
MCHC RBC AUTO-ENTMCNC: 34.3 G/DL (ref 32–36)
MCV RBC AUTO: 93 FL (ref 82–98)
MONOCYTES # BLD AUTO: 0.3 K/UL (ref 0.3–1)
MONOCYTES NFR BLD: 6.8 % (ref 4–15)
NEUTROPHILS # BLD AUTO: 3 K/UL (ref 1.8–7.7)
NEUTROPHILS NFR BLD: 65.3 % (ref 38–73)
NRBC BLD-RTO: 0 /100 WBC
PLATELET # BLD AUTO: 245 K/UL (ref 150–450)
PMV BLD AUTO: 11.1 FL (ref 9.2–12.9)
RBC # BLD AUTO: 4.63 M/UL (ref 4–5.4)
TESTOST SERPL-MCNC: 16 NG/DL (ref 5–73)
TSH SERPL DL<=0.005 MIU/L-ACNC: 1.71 UIU/ML (ref 0.4–4)
WBC # BLD AUTO: 4.55 K/UL (ref 3.9–12.7)

## 2022-05-31 PROCEDURE — 36415 COLL VENOUS BLD VENIPUNCTURE: CPT | Performed by: OBSTETRICS & GYNECOLOGY

## 2022-05-31 PROCEDURE — 84443 ASSAY THYROID STIM HORMONE: CPT | Performed by: OBSTETRICS & GYNECOLOGY

## 2022-05-31 PROCEDURE — 85025 COMPLETE CBC W/AUTO DIFF WBC: CPT | Performed by: OBSTETRICS & GYNECOLOGY

## 2022-05-31 PROCEDURE — 84402 ASSAY OF FREE TESTOSTERONE: CPT | Performed by: OBSTETRICS & GYNECOLOGY

## 2022-05-31 PROCEDURE — 84403 ASSAY OF TOTAL TESTOSTERONE: CPT | Performed by: OBSTETRICS & GYNECOLOGY

## 2022-06-03 LAB — TESTOST FREE SERPL-MCNC: 0.6 PG/ML

## 2022-06-07 ENCOUNTER — CLINICAL SUPPORT (OUTPATIENT)
Dept: OBSTETRICS AND GYNECOLOGY | Facility: CLINIC | Age: 47
End: 2022-06-07
Payer: COMMERCIAL

## 2022-06-07 DIAGNOSIS — E89.40 SURGICAL MENOPAUSE: Primary | ICD-10-CM

## 2022-06-07 PROCEDURE — 99999 PR PBB SHADOW E&M-EST. PATIENT-LVL I: CPT | Mod: PBBFAC,,,

## 2022-06-07 PROCEDURE — 96372 PR INJECTION,THERAP/PROPH/DIAG2ST, IM OR SUBCUT: ICD-10-PCS | Mod: S$GLB,,, | Performed by: OBSTETRICS & GYNECOLOGY

## 2022-06-07 PROCEDURE — 99999 PR PBB SHADOW E&M-EST. PATIENT-LVL I: ICD-10-PCS | Mod: PBBFAC,,,

## 2022-06-07 PROCEDURE — 96372 THER/PROPH/DIAG INJ SC/IM: CPT | Mod: S$GLB,,, | Performed by: OBSTETRICS & GYNECOLOGY

## 2022-06-07 RX ADMIN — TESTOSTERONE CYPIONATE 38 MG: 200 INJECTION, SOLUTION INTRAMUSCULAR at 09:06

## 2022-06-07 NOTE — PROGRESS NOTES
...Ordering Provider  Dr. TAYLOR Rock       6/7/22 Pt administered 38 mg of testosterone to the right upper outer glut. Pt tolerated well. Pt instructed next injection is due in 4 weeks, pt will be traveling and Dr Rock has given a prescription for the pt's  to administer. Pt will come back and restart injections with us on 8/8/22. Pt verbalizes understanding.       Pain Before:  None    Pain After: None    Patient Complaints: None

## 2022-06-08 ENCOUNTER — PATIENT MESSAGE (OUTPATIENT)
Dept: OBSTETRICS AND GYNECOLOGY | Facility: CLINIC | Age: 47
End: 2022-06-08
Payer: COMMERCIAL

## 2022-06-08 DIAGNOSIS — E89.40 SURGICAL MENOPAUSE: Primary | ICD-10-CM

## 2022-06-08 RX ORDER — TESTOSTERONE CYPIONATE 200 MG/ML
50 INJECTION, SOLUTION INTRAMUSCULAR
Status: SHIPPED | OUTPATIENT
Start: 2022-06-08 | End: 2022-11-23

## 2022-08-01 ENCOUNTER — PATIENT MESSAGE (OUTPATIENT)
Dept: OPTOMETRY | Facility: CLINIC | Age: 47
End: 2022-08-01
Payer: COMMERCIAL

## 2022-08-08 ENCOUNTER — CLINICAL SUPPORT (OUTPATIENT)
Dept: OBSTETRICS AND GYNECOLOGY | Facility: CLINIC | Age: 47
End: 2022-08-08
Payer: COMMERCIAL

## 2022-08-08 DIAGNOSIS — E89.40 SURGICAL MENOPAUSE: Primary | ICD-10-CM

## 2022-08-08 PROCEDURE — 99999 PR PBB SHADOW E&M-EST. PATIENT-LVL I: CPT | Mod: PBBFAC,,,

## 2022-08-08 PROCEDURE — 99999 PR PBB SHADOW E&M-EST. PATIENT-LVL I: ICD-10-PCS | Mod: PBBFAC,,,

## 2022-08-08 PROCEDURE — 96372 PR INJECTION,THERAP/PROPH/DIAG2ST, IM OR SUBCUT: ICD-10-PCS | Mod: S$GLB,,, | Performed by: OBSTETRICS & GYNECOLOGY

## 2022-08-08 PROCEDURE — 96372 THER/PROPH/DIAG INJ SC/IM: CPT | Mod: S$GLB,,, | Performed by: OBSTETRICS & GYNECOLOGY

## 2022-08-08 RX ADMIN — TESTOSTERONE CYPIONATE 50 MG: 200 INJECTION, SOLUTION INTRAMUSCULAR at 10:08

## 2022-08-08 NOTE — PROGRESS NOTES
...Ordering Provider  Dr. TAYLOR Rock       8/8/22 Pt administered #1 50 mg of testosterone to the right upper outer glut. Pt tolerated well. Pt instructed next injection is due on 9/6/22. Pt verbalizes understanding.       Pain Before:  None    Pain After: None    Patient Complaints: None

## 2022-08-12 RX ORDER — AMOXICILLIN AND CLAVULANATE POTASSIUM 500; 125 MG/1; MG/1
1 TABLET, FILM COATED ORAL 2 TIMES DAILY
Qty: 14 TABLET | Refills: 0 | Status: SHIPPED | OUTPATIENT
Start: 2022-08-12 | End: 2022-08-20

## 2022-09-06 ENCOUNTER — CLINICAL SUPPORT (OUTPATIENT)
Dept: OBSTETRICS AND GYNECOLOGY | Facility: CLINIC | Age: 47
End: 2022-09-06
Payer: COMMERCIAL

## 2022-09-06 DIAGNOSIS — E89.40 SURGICAL MENOPAUSE: Primary | ICD-10-CM

## 2022-09-06 PROCEDURE — 99999 PR PBB SHADOW E&M-EST. PATIENT-LVL I: CPT | Mod: PBBFAC,,,

## 2022-09-06 PROCEDURE — 99999 PR PBB SHADOW E&M-EST. PATIENT-LVL I: ICD-10-PCS | Mod: PBBFAC,,,

## 2022-09-06 PROCEDURE — 96372 THER/PROPH/DIAG INJ SC/IM: CPT | Mod: S$GLB,,, | Performed by: OBSTETRICS & GYNECOLOGY

## 2022-09-06 PROCEDURE — 96372 PR INJECTION,THERAP/PROPH/DIAG2ST, IM OR SUBCUT: ICD-10-PCS | Mod: S$GLB,,, | Performed by: OBSTETRICS & GYNECOLOGY

## 2022-09-06 RX ADMIN — TESTOSTERONE CYPIONATE 50 MG: 200 INJECTION, SOLUTION INTRAMUSCULAR at 11:09

## 2022-09-06 NOTE — PROGRESS NOTES
...Ordering Provider  Dr. TAYLOR Rock       9/6/22 Pt administered #2 50 mg of testosterone to the left upper outer glut. Pt tolerated well. Pt instructed next injection is due on 9/27/22 with testosterone levels to be drawn prior to administration. Pt verbalizes understanding.       Pain Before:  None    Pain After: None    Patient Complaints: None

## 2022-09-27 ENCOUNTER — LAB VISIT (OUTPATIENT)
Dept: LAB | Facility: HOSPITAL | Age: 47
End: 2022-09-27
Attending: OBSTETRICS & GYNECOLOGY
Payer: COMMERCIAL

## 2022-09-27 ENCOUNTER — CLINICAL SUPPORT (OUTPATIENT)
Dept: OBSTETRICS AND GYNECOLOGY | Facility: CLINIC | Age: 47
End: 2022-09-27
Payer: COMMERCIAL

## 2022-09-27 DIAGNOSIS — E89.40 SURGICAL MENOPAUSE: ICD-10-CM

## 2022-09-27 DIAGNOSIS — E89.40 SURGICAL MENOPAUSE: Primary | ICD-10-CM

## 2022-09-27 LAB — TESTOST SERPL-MCNC: 75 NG/DL (ref 5–73)

## 2022-09-27 PROCEDURE — 96372 THER/PROPH/DIAG INJ SC/IM: CPT | Mod: S$GLB,,, | Performed by: OBSTETRICS & GYNECOLOGY

## 2022-09-27 PROCEDURE — 96372 PR INJECTION,THERAP/PROPH/DIAG2ST, IM OR SUBCUT: ICD-10-PCS | Mod: S$GLB,,, | Performed by: OBSTETRICS & GYNECOLOGY

## 2022-09-27 PROCEDURE — 84403 ASSAY OF TOTAL TESTOSTERONE: CPT | Performed by: OBSTETRICS & GYNECOLOGY

## 2022-09-27 PROCEDURE — 99999 PR PBB SHADOW E&M-EST. PATIENT-LVL II: CPT | Mod: PBBFAC,,,

## 2022-09-27 PROCEDURE — 99999 PR PBB SHADOW E&M-EST. PATIENT-LVL II: ICD-10-PCS | Mod: PBBFAC,,,

## 2022-09-27 PROCEDURE — 84402 ASSAY OF FREE TESTOSTERONE: CPT | Performed by: OBSTETRICS & GYNECOLOGY

## 2022-09-27 RX ADMIN — TESTOSTERONE CYPIONATE 50 MG: 200 INJECTION, SOLUTION INTRAMUSCULAR at 12:09

## 2022-09-27 NOTE — PROGRESS NOTES
...Ordering Provider  Dr. SIXTO Hanson       9/27/22 Pt administered 50 mg of testosterone to the right upper outer glut. Pt tolerated well. Pt instructed next injection is due in four weeks, pt will follow up with Dr Rock at her new practice. Pt verbalizes understanding.       Pain Before:  None    Pain After: None    Patient Complaints: None

## 2022-09-30 LAB — TESTOST FREE SERPL-MCNC: 1.4 PG/ML

## 2022-10-31 ENCOUNTER — LAB VISIT (OUTPATIENT)
Dept: LAB | Facility: OTHER | Age: 47
End: 2022-10-31
Attending: OBSTETRICS & GYNECOLOGY
Payer: COMMERCIAL

## 2022-10-31 DIAGNOSIS — Z00.00 ROUTINE GENERAL MEDICAL EXAMINATION AT A HEALTH CARE FACILITY: Primary | ICD-10-CM

## 2022-10-31 LAB
ALBUMIN SERPL BCP-MCNC: 3.9 G/DL (ref 3.5–5.2)
ALP SERPL-CCNC: 37 U/L (ref 55–135)
ALT SERPL W/O P-5'-P-CCNC: 21 U/L (ref 10–44)
ANION GAP SERPL CALC-SCNC: 6 MMOL/L (ref 8–16)
AST SERPL-CCNC: 17 U/L (ref 10–40)
BILIRUB SERPL-MCNC: 0.6 MG/DL (ref 0.1–1)
BUN SERPL-MCNC: 13 MG/DL (ref 6–20)
CALCIUM SERPL-MCNC: 9.1 MG/DL (ref 8.7–10.5)
CHLORIDE SERPL-SCNC: 106 MMOL/L (ref 95–110)
CHOLEST SERPL-MCNC: 142 MG/DL (ref 120–199)
CHOLEST/HDLC SERPL: 2 {RATIO} (ref 2–5)
CO2 SERPL-SCNC: 25 MMOL/L (ref 23–29)
CREAT SERPL-MCNC: 1 MG/DL (ref 0.5–1.4)
ERYTHROCYTE [DISTWIDTH] IN BLOOD BY AUTOMATED COUNT: 12.9 % (ref 11.5–14.5)
EST. GFR  (NO RACE VARIABLE): >60 ML/MIN/1.73 M^2
ESTIMATED AVG GLUCOSE: 97 MG/DL (ref 68–131)
ESTRADIOL SERPL-MCNC: 74 PG/ML
GLUCOSE SERPL-MCNC: 88 MG/DL (ref 70–110)
HBA1C MFR BLD: 5 % (ref 4–5.6)
HCT VFR BLD AUTO: 42.4 % (ref 37–48.5)
HDLC SERPL-MCNC: 71 MG/DL (ref 40–75)
HDLC SERPL: 50 % (ref 20–50)
HGB BLD-MCNC: 14.4 G/DL (ref 12–16)
LDLC SERPL CALC-MCNC: 59 MG/DL (ref 63–159)
MCH RBC QN AUTO: 31.4 PG (ref 27–31)
MCHC RBC AUTO-ENTMCNC: 34 G/DL (ref 32–36)
MCV RBC AUTO: 93 FL (ref 82–98)
NONHDLC SERPL-MCNC: 71 MG/DL
PLATELET # BLD AUTO: 279 K/UL (ref 150–450)
PMV BLD AUTO: 11 FL (ref 9.2–12.9)
POTASSIUM SERPL-SCNC: 4.5 MMOL/L (ref 3.5–5.1)
PROGEST SERPL-MCNC: 18.2 NG/ML
PROT SERPL-MCNC: 6.8 G/DL (ref 6–8.4)
RBC # BLD AUTO: 4.58 M/UL (ref 4–5.4)
SODIUM SERPL-SCNC: 137 MMOL/L (ref 136–145)
TRIGL SERPL-MCNC: 60 MG/DL (ref 30–150)
WBC # BLD AUTO: 6.22 K/UL (ref 3.9–12.7)

## 2022-10-31 PROCEDURE — 36415 COLL VENOUS BLD VENIPUNCTURE: CPT | Performed by: OBSTETRICS & GYNECOLOGY

## 2022-10-31 PROCEDURE — 80053 COMPREHEN METABOLIC PANEL: CPT | Performed by: OBSTETRICS & GYNECOLOGY

## 2022-10-31 PROCEDURE — 85027 COMPLETE CBC AUTOMATED: CPT | Performed by: OBSTETRICS & GYNECOLOGY

## 2022-10-31 PROCEDURE — 80061 LIPID PANEL: CPT | Performed by: OBSTETRICS & GYNECOLOGY

## 2022-10-31 PROCEDURE — 84144 ASSAY OF PROGESTERONE: CPT | Performed by: OBSTETRICS & GYNECOLOGY

## 2022-10-31 PROCEDURE — 83036 HEMOGLOBIN GLYCOSYLATED A1C: CPT | Performed by: OBSTETRICS & GYNECOLOGY

## 2022-10-31 PROCEDURE — 82670 ASSAY OF TOTAL ESTRADIOL: CPT | Performed by: OBSTETRICS & GYNECOLOGY

## 2023-03-16 ENCOUNTER — OFFICE VISIT (OUTPATIENT)
Dept: OPTOMETRY | Facility: CLINIC | Age: 48
End: 2023-03-16
Payer: COMMERCIAL

## 2023-03-16 DIAGNOSIS — H04.129 DRY EYE: ICD-10-CM

## 2023-03-16 DIAGNOSIS — Z46.0 FITTING AND ADJUSTMENT OF SPECTACLES AND CONTACT LENSES: Primary | ICD-10-CM

## 2023-03-16 DIAGNOSIS — H52.13 MYOPIA OF BOTH EYES: Primary | ICD-10-CM

## 2023-03-16 PROCEDURE — 92014 COMPRE OPH EXAM EST PT 1/>: CPT | Mod: S$GLB,,, | Performed by: OPTOMETRIST

## 2023-03-16 PROCEDURE — 92310 PR CONTACT LENS FITTING (NO CHANGE): ICD-10-PCS | Mod: S$GLB,,, | Performed by: OPTOMETRIST

## 2023-03-16 PROCEDURE — 92015 PR REFRACTION: ICD-10-PCS | Mod: S$GLB,,, | Performed by: OPTOMETRIST

## 2023-03-16 PROCEDURE — 92015 DETERMINE REFRACTIVE STATE: CPT | Mod: S$GLB,,, | Performed by: OPTOMETRIST

## 2023-03-16 PROCEDURE — 92310 CONTACT LENS FITTING OU: CPT | Mod: S$GLB,,, | Performed by: OPTOMETRIST

## 2023-03-16 PROCEDURE — 92014 PR EYE EXAM, EST PATIENT,COMPREHESV: ICD-10-PCS | Mod: S$GLB,,, | Performed by: OPTOMETRIST

## 2023-03-16 PROCEDURE — 99999 PR PBB SHADOW E&M-EST. PATIENT-LVL II: ICD-10-PCS | Mod: PBBFAC,,, | Performed by: OPTOMETRIST

## 2023-03-16 PROCEDURE — 99999 PR PBB SHADOW E&M-EST. PATIENT-LVL II: CPT | Mod: PBBFAC,,, | Performed by: OPTOMETRIST

## 2023-03-16 NOTE — PROGRESS NOTES
HPI    Adrienne Andrew is a 47 y.o. female who returns for continued eye care.   She has mild bilateral myopia for which  Acuvue Marilu monthly replacement   contact lenses are prescribed and worn as primary mode of visual   correction. Her last exam with me was on 03/17/2022. Today, she reports   that comfort, handling and vision with the contact lenses are good.  She   purchased OTC readers several months ago to use, occasionally, for very   small print. She explains that her eyes have been getting very dry at   night.     (--)blurred vision  (--)Headaches  (--)diplopia  (--)flashes  (--)floaters  (--)pain  (--)Itching  (--)tearing  (--)burning  (+)Dryness  (--) OTC Drops  (--)Photophobia     Last edited by Jung Fuentes, OD on 3/16/2023 12:27 PM.        Review of Systems   Constitutional:  Negative for chills, fever and malaise/fatigue.   HENT:  Negative for congestion, hearing loss and sore throat.    Eyes:  Negative for blurred vision, double vision, photophobia, pain, discharge and redness.        Dry eyes   Respiratory: Negative.  Negative for cough, shortness of breath and wheezing.    Gastrointestinal:  Negative for nausea and vomiting.   Musculoskeletal: Negative.    Skin: Negative.    Neurological:  Negative for seizures.     For exam results, see encounter report    Assessment /Plan     1. Myopia of both eyes  - Spec Rx per final Rx below   Glasses Prescription (3/16/2023)          Sphere Cylinder Dist [facility]    Right -1.50 Sphere 20/20    Left -1.50 Sphere 20/20      Type: SVL    Expiration Date: 9/16/2023          - Order trials:   Acuvue Marilu (8.4/14.0) -1.50 OU    2. Dry eye  - Will try Tyrvaya    Patient education; RTC in 1 year with DFE; Ok to instill 0.5% Tropicamide after (normal) baseline workup, sooner as needed, at [facility]      -------------------Addendum 4/13/23----------------------------  Contact Lens Prescription (3/16/2023)          Brand Base Curve Diameter Sphere    Right  Acuvue Marilu 8.4 14.0 -1.50    Left Acuvue Marilu 8.4 14.0 -1.50      Expiration Date: 4/13/2024    Replacement: Monthly    Solutions: OptiFree PureMoist    Wearing Schedule: Daily Wear            RTC in 1 year with DFE; Ok to instill 0.5% Tropicamide after (normal) baseline workup, sooner as needed, at [facility]

## 2023-03-27 ENCOUNTER — OFFICE VISIT (OUTPATIENT)
Dept: OTOLARYNGOLOGY | Facility: CLINIC | Age: 48
End: 2023-03-27
Payer: COMMERCIAL

## 2023-03-27 ENCOUNTER — LAB VISIT (OUTPATIENT)
Dept: LAB | Facility: HOSPITAL | Age: 48
End: 2023-03-27
Attending: STUDENT IN AN ORGANIZED HEALTH CARE EDUCATION/TRAINING PROGRAM
Payer: COMMERCIAL

## 2023-03-27 ENCOUNTER — PATIENT MESSAGE (OUTPATIENT)
Dept: OPTOMETRY | Facility: CLINIC | Age: 48
End: 2023-03-27
Payer: COMMERCIAL

## 2023-03-27 VITALS
HEART RATE: 80 BPM | DIASTOLIC BLOOD PRESSURE: 85 MMHG | WEIGHT: 118.63 LBS | TEMPERATURE: 98 F | HEIGHT: 61 IN | OXYGEN SATURATION: 99 % | BODY MASS INDEX: 22.4 KG/M2 | SYSTOLIC BLOOD PRESSURE: 132 MMHG | RESPIRATION RATE: 14 BRPM

## 2023-03-27 DIAGNOSIS — J34.2 NASAL SEPTAL DEVIATION: ICD-10-CM

## 2023-03-27 DIAGNOSIS — J34.3 HYPERTROPHY OF BOTH INFERIOR NASAL TURBINATES: ICD-10-CM

## 2023-03-27 DIAGNOSIS — G43.009 ATYPICAL MIGRAINE: ICD-10-CM

## 2023-03-27 DIAGNOSIS — J30.9 ALLERGIC RHINITIS, UNSPECIFIED SEASONALITY, UNSPECIFIED TRIGGER: ICD-10-CM

## 2023-03-27 DIAGNOSIS — J30.9 ALLERGIC RHINITIS, UNSPECIFIED SEASONALITY, UNSPECIFIED TRIGGER: Primary | ICD-10-CM

## 2023-03-27 PROCEDURE — 86003 ALLG SPEC IGE CRUDE XTRC EA: CPT | Performed by: STUDENT IN AN ORGANIZED HEALTH CARE EDUCATION/TRAINING PROGRAM

## 2023-03-27 PROCEDURE — 3079F PR MOST RECENT DIASTOLIC BLOOD PRESSURE 80-89 MM HG: ICD-10-PCS | Mod: CPTII,S$GLB,, | Performed by: STUDENT IN AN ORGANIZED HEALTH CARE EDUCATION/TRAINING PROGRAM

## 2023-03-27 PROCEDURE — 1160F PR REVIEW ALL MEDS BY PRESCRIBER/CLIN PHARMACIST DOCUMENTED: ICD-10-PCS | Mod: CPTII,S$GLB,, | Performed by: STUDENT IN AN ORGANIZED HEALTH CARE EDUCATION/TRAINING PROGRAM

## 2023-03-27 PROCEDURE — 1159F MED LIST DOCD IN RCRD: CPT | Mod: CPTII,S$GLB,, | Performed by: STUDENT IN AN ORGANIZED HEALTH CARE EDUCATION/TRAINING PROGRAM

## 2023-03-27 PROCEDURE — 99203 OFFICE O/P NEW LOW 30 MIN: CPT | Mod: 25,S$GLB,, | Performed by: STUDENT IN AN ORGANIZED HEALTH CARE EDUCATION/TRAINING PROGRAM

## 2023-03-27 PROCEDURE — 36415 COLL VENOUS BLD VENIPUNCTURE: CPT | Performed by: STUDENT IN AN ORGANIZED HEALTH CARE EDUCATION/TRAINING PROGRAM

## 2023-03-27 PROCEDURE — 3079F DIAST BP 80-89 MM HG: CPT | Mod: CPTII,S$GLB,, | Performed by: STUDENT IN AN ORGANIZED HEALTH CARE EDUCATION/TRAINING PROGRAM

## 2023-03-27 PROCEDURE — 86003 ALLG SPEC IGE CRUDE XTRC EA: CPT | Mod: 59 | Performed by: STUDENT IN AN ORGANIZED HEALTH CARE EDUCATION/TRAINING PROGRAM

## 2023-03-27 PROCEDURE — 3075F PR MOST RECENT SYSTOLIC BLOOD PRESS GE 130-139MM HG: ICD-10-PCS | Mod: CPTII,S$GLB,, | Performed by: STUDENT IN AN ORGANIZED HEALTH CARE EDUCATION/TRAINING PROGRAM

## 2023-03-27 PROCEDURE — 99999 PR PBB SHADOW E&M-EST. PATIENT-LVL IV: ICD-10-PCS | Mod: PBBFAC,,, | Performed by: STUDENT IN AN ORGANIZED HEALTH CARE EDUCATION/TRAINING PROGRAM

## 2023-03-27 PROCEDURE — 31231 NASAL ENDOSCOPY DX: CPT | Mod: S$GLB,,, | Performed by: STUDENT IN AN ORGANIZED HEALTH CARE EDUCATION/TRAINING PROGRAM

## 2023-03-27 PROCEDURE — 3008F PR BODY MASS INDEX (BMI) DOCUMENTED: ICD-10-PCS | Mod: CPTII,S$GLB,, | Performed by: STUDENT IN AN ORGANIZED HEALTH CARE EDUCATION/TRAINING PROGRAM

## 2023-03-27 PROCEDURE — 99999 PR PBB SHADOW E&M-EST. PATIENT-LVL IV: CPT | Mod: PBBFAC,,, | Performed by: STUDENT IN AN ORGANIZED HEALTH CARE EDUCATION/TRAINING PROGRAM

## 2023-03-27 PROCEDURE — 99203 PR OFFICE/OUTPT VISIT, NEW, LEVL III, 30-44 MIN: ICD-10-PCS | Mod: 25,S$GLB,, | Performed by: STUDENT IN AN ORGANIZED HEALTH CARE EDUCATION/TRAINING PROGRAM

## 2023-03-27 PROCEDURE — 31231 PR NASAL ENDOSCOPY, DX: ICD-10-PCS | Mod: S$GLB,,, | Performed by: STUDENT IN AN ORGANIZED HEALTH CARE EDUCATION/TRAINING PROGRAM

## 2023-03-27 PROCEDURE — 1159F PR MEDICATION LIST DOCUMENTED IN MEDICAL RECORD: ICD-10-PCS | Mod: CPTII,S$GLB,, | Performed by: STUDENT IN AN ORGANIZED HEALTH CARE EDUCATION/TRAINING PROGRAM

## 2023-03-27 PROCEDURE — 3075F SYST BP GE 130 - 139MM HG: CPT | Mod: CPTII,S$GLB,, | Performed by: STUDENT IN AN ORGANIZED HEALTH CARE EDUCATION/TRAINING PROGRAM

## 2023-03-27 PROCEDURE — 1160F RVW MEDS BY RX/DR IN RCRD: CPT | Mod: CPTII,S$GLB,, | Performed by: STUDENT IN AN ORGANIZED HEALTH CARE EDUCATION/TRAINING PROGRAM

## 2023-03-27 PROCEDURE — 3008F BODY MASS INDEX DOCD: CPT | Mod: CPTII,S$GLB,, | Performed by: STUDENT IN AN ORGANIZED HEALTH CARE EDUCATION/TRAINING PROGRAM

## 2023-03-27 RX ORDER — AZELASTINE 1 MG/ML
1 SPRAY, METERED NASAL 2 TIMES DAILY
Qty: 30 ML | Refills: 6 | Status: SHIPPED | OUTPATIENT
Start: 2023-03-27 | End: 2024-03-26

## 2023-03-27 NOTE — PROGRESS NOTES
Otolaryngology - Head and Neck Surgery New Patient Visit    3/27/2023    Referring Provider: No ref. provider found    Chief Complaint   Patient presents with    Sinus Problem       History of Present Illness, Otolaryngology Specialty-Specific Exam, and Assessment and Plan:     Adrienne Andrew is a 47 y.o. female who presents for evaluation of sinus pressure and dizziness, which has been present for the last month. Has gotten worse since retiring from [facility] to [facility] over [employer] gr[employer]. She complains of feeling significant pressure in her head (mostly above and below left eye), and ear popping. She denies purulent nasal drainage, epistaxis, vision changes, or nasal trauma. Dizziness is described and unsteadiness, no feeling that the room is spinning. Dizziness can last few minuets and occur multiple times a day.  She has been treated with sinus irrigations and sudafed in the past, although she does not have much improvement in her symptoms anymore after irrigations. She has never had allergy testing. She denies previous skullbase surgery. She denies snoring.     SNOT-22 score: : (P) 26  NOSE score:: (P) 15%  ETDQ-7 score:: (P) 1    On exam today, the ears are normal. The oral cavity is clear. The neck is clear. The nasopharynx, hypopharynx, and larynx are normal. Nasal endoscopy reveals slight left septal deviation. Hypertrophic inferior turbinates bilaterally. No nasal masses or nasal polyposis. No purulent drainage in the middle meatus. Nasopharynx clear without lesions. Eustachian tubes WNL.  Accessory os on left.     Impression today is allergic rhinitis. I have recommended that she start on high volume sinus irrigations with Astelin.    Given findings (examination, sinonasal endoscopy, and/or imaging performed and reviewed) and her history related to these sinonasal issues that have been refractory to current medical management, I feel escalation of medical management is indicated at this time. I have  "specifically recommended budesonide irrigations for stronger topical steroid therapy. This is intended to both improve overall management and symptom control, and to reduce potential need for systemic steroids and the associated adverse effects and risks associated with recurrent systemic steroid therapy. I counseled her on the off-label nature of this particular use/delivery of budesonide, and she consented to use.     -Specific instructions regarding the use of budesonide nasal irrigations were provided, including review of use of standard nasal saline irrigations and the addition of budesonide to these nasal saline irrigations. Instructions were also provided on obtaining this medication and a prescription was sent in for the patient (to [facility]) so that home delivery/mail order can be arranged. I have authorized once daily dosing if financial issues prevent twice daily use (recommended), as well as options for alternative steroids to be used should the advised budesonide be prohibitively expensive. All questions regarding this were answered, and I encouraged her to call for any additional questions, concerns, or if there were any issues with obtaining the budesonide for use in irrigations.        Immuno caps will be assessed.     Discussed her unsteadiness. If no improvement after medical treatment can consider VNG.     Thank you for allowing us to participate in the care of your patient. We will continue to keep you informed of her progress.    Sincerely yours,    Jarrett Reynolds MD      Objective     Physical Examination  Vitals -  height is 5' 1" (1.549 m) and weight is 53.8 kg (118 lb 9.7 oz). Her temperature is 98.1 °F (36.7 °C). Her blood pressure is 132/85 and her pulse is 80. Her respiration is 14 and oxygen saturation is 99%.   Constitutional - General appearance: Normal. Ability to communicate: Normal.  Head & Face - Overall appearance, scars, masses: Normal. Palpation &/or percussion " "of face: Normal. Salivary glands: Normal. Facial strength: Normal  ENMT - Otoscopic exam: Normal. Assessment of hearing: Normal. External inspection: Normal. Nasal mucosa, septum, turbinates: Abnormal see exam details. Lips, teeth, gums: Normal. Oropharynx: Normal. Pharyngeal walls/pyriform sinus: Normal. Larynx: Normal. Nasopharynx: Normal  Neck - Neck: Normal. Thyroid: Normal  Lymphatic - Palpation of lymph nodes: Normal  Eyes - Ocular mobility: Normal  Respiratory - Inspection of Chest: Normal  Cardiovascular - Peripheral vascular system: Normal  Neurological/Psychiatric - Orientation: Normal    Review of Systems  Review of Systems   Constitutional:  Positive for malaise/fatigue.   Eyes:  Positive for photophobia.   Respiratory: Negative.     Gastrointestinal: Negative.    Genitourinary: Negative.    Musculoskeletal: Negative.    Skin: Negative.    Neurological:  Positive for dizziness and headaches.   Psychiatric/Behavioral: Negative.        Answers submitted by the patient for this visit:  Review of Symptoms Questionnaire  (Submitted on 3/27/2023)  facial swelling: Yes  sinus pressure : Yes  Irregular heartbeat?: Yes  Seasonal Allergies?: Yes  None of these : Yes  Light-headedness: Yes  None of these: Yes    A complete review of systems was obtained 03/27/2023 and reviewed.  The review of systems is negative for symptoms except as described above.    /85 (BP Location: Left arm, Patient Position: Sitting)   Pulse 80   Temp 98.1 °F (36.7 °C)   Resp 14   Ht 5' 1" (1.549 m)   Wt 53.8 kg (118 lb 9.7 oz)   LMP 06/13/2021 Comment: ANAID / ADRIANA  6-23-21  SpO2 99%   BMI 22.41 kg/m²      Nasal Endoscopy:  3/27/2023    The use of diagnostic nasal endoscopy was considered medically necessary for the evaluation and visualization of the nasal anatomy for symptoms suggestive of nasal or sinus origin. Physical examination (including a nasal speculum evaluation) did not provide sufficient clinical information to " establish a diagnosis, or symptoms did not improve or worsened following treatment.     The nasal cavity was decongested with topical 1% phenylephrine and anesthetized with 4% lidocaine.  A rigid 0-degree endoscope was introduced into the nasal cavity.    The patient was seated in the examination chair. After discussion of risks and benefits, a nasal endoscope was inserted into the nose the endoscope was passed along the left nasal floor to the nasopharynx. It was then passed between the middle and superior meatus, nasal turbinates, nasal septum, nasopharynx and sphenoethmoid region. The nasal endoscope was withdrawn and there was no complications. An identical procedure was performed on the right side. I was present for the entire procedure.The patient tolerated the above procedure well. The findings of this procedure can be found in the dictated note from 3/27/2023 visit.                                Data Reviewed    WBC (K/uL)   Date Value   10/31/2022 6.22     Eosinophil % (%)   Date Value   05/31/2022 3.3     Eos # (K/uL)   Date Value   05/31/2022 0.2     Platelets (K/uL)   Date Value   10/31/2022 279     Glucose (mg/dL)   Date Value   10/31/2022 88     No results found for: IGE    No sinus imaging available.

## 2023-03-30 LAB
A ALTERNATA IGE QN: 0.28 KU/L
A FUMIGATUS IGE QN: <0.1 KU/L
BERMUDA GRASS IGE QN: <0.1 KU/L
CAT DANDER IGE QN: 0.48 KU/L
CEDAR IGE QN: <0.1 KU/L
D FARINAE IGE QN: 1.37 KU/L
D PTERONYSS IGE QN: 1.72 KU/L
DEPRECATED A ALTERNATA IGE RAST QL: ABNORMAL
DEPRECATED A FUMIGATUS IGE RAST QL: NORMAL
DEPRECATED BERMUDA GRASS IGE RAST QL: NORMAL
DEPRECATED CAT DANDER IGE RAST QL: ABNORMAL
DEPRECATED CEDAR IGE RAST QL: NORMAL
DEPRECATED D FARINAE IGE RAST QL: ABNORMAL
DEPRECATED D PTERONYSS IGE RAST QL: ABNORMAL
DEPRECATED DOG DANDER IGE RAST QL: ABNORMAL
DEPRECATED ELDER IGE RAST QL: NORMAL
DEPRECATED ENGL PLANTAIN IGE RAST QL: NORMAL
DEPRECATED PECAN/HICK TREE IGE RAST QL: NORMAL
DEPRECATED ROACH IGE RAST QL: NORMAL
DEPRECATED TIMOTHY IGE RAST QL: NORMAL
DEPRECATED WEST RAGWEED IGE RAST QL: ABNORMAL
DEPRECATED WHITE OAK IGE RAST QL: NORMAL
DOG DANDER IGE QN: 0.31 KU/L
ELDER IGE QN: <0.1 KU/L
ENGL PLANTAIN IGE QN: <0.1 KU/L
PECAN/HICK TREE IGE QN: <0.1 KU/L
ROACH IGE QN: <0.1 KU/L
TIMOTHY IGE QN: <0.1 KU/L
WEST RAGWEED IGE QN: 0.12 KU/L
WHITE OAK IGE QN: <0.1 KU/L

## 2023-04-10 ENCOUNTER — LAB VISIT (OUTPATIENT)
Dept: LAB | Facility: HOSPITAL | Age: 48
End: 2023-04-10
Attending: INTERNAL MEDICINE
Payer: COMMERCIAL

## 2023-04-10 ENCOUNTER — OFFICE VISIT (OUTPATIENT)
Dept: INTERNAL MEDICINE | Facility: CLINIC | Age: 48
End: 2023-04-10
Payer: COMMERCIAL

## 2023-04-10 VITALS
HEIGHT: 61 IN | WEIGHT: 116.88 LBS | DIASTOLIC BLOOD PRESSURE: 86 MMHG | BODY MASS INDEX: 22.07 KG/M2 | SYSTOLIC BLOOD PRESSURE: 132 MMHG | HEART RATE: 75 BPM | OXYGEN SATURATION: 99 %

## 2023-04-10 DIAGNOSIS — Z12.11 SCREEN FOR COLON CANCER: ICD-10-CM

## 2023-04-10 DIAGNOSIS — Z12.31 SCREENING MAMMOGRAM, ENCOUNTER FOR: ICD-10-CM

## 2023-04-10 DIAGNOSIS — R00.2 PALPITATIONS: ICD-10-CM

## 2023-04-10 DIAGNOSIS — Z00.00 VISIT FOR ANNUAL HEALTH EXAMINATION: ICD-10-CM

## 2023-04-10 DIAGNOSIS — Z00.00 VISIT FOR ANNUAL HEALTH EXAMINATION: Primary | ICD-10-CM

## 2023-04-10 LAB
ANION GAP SERPL CALC-SCNC: 10 MMOL/L (ref 8–16)
BUN SERPL-MCNC: 11 MG/DL (ref 6–20)
CALCIUM SERPL-MCNC: 9.3 MG/DL (ref 8.7–10.5)
CHLORIDE SERPL-SCNC: 104 MMOL/L (ref 95–110)
CO2 SERPL-SCNC: 24 MMOL/L (ref 23–29)
CREAT SERPL-MCNC: 0.9 MG/DL (ref 0.5–1.4)
EST. GFR  (NO RACE VARIABLE): >60 ML/MIN/1.73 M^2
GLUCOSE SERPL-MCNC: 83 MG/DL (ref 70–110)
MAGNESIUM SERPL-MCNC: 1.9 MG/DL (ref 1.6–2.6)
POTASSIUM SERPL-SCNC: 4.6 MMOL/L (ref 3.5–5.1)
SODIUM SERPL-SCNC: 138 MMOL/L (ref 136–145)
TSH SERPL DL<=0.005 MIU/L-ACNC: 1.84 UIU/ML (ref 0.4–4)

## 2023-04-10 PROCEDURE — 1159F MED LIST DOCD IN RCRD: CPT | Mod: CPTII,S$GLB,, | Performed by: INTERNAL MEDICINE

## 2023-04-10 PROCEDURE — 3075F SYST BP GE 130 - 139MM HG: CPT | Mod: CPTII,S$GLB,, | Performed by: INTERNAL MEDICINE

## 2023-04-10 PROCEDURE — 3075F PR MOST RECENT SYSTOLIC BLOOD PRESS GE 130-139MM HG: ICD-10-PCS | Mod: CPTII,S$GLB,, | Performed by: INTERNAL MEDICINE

## 2023-04-10 PROCEDURE — 80048 BASIC METABOLIC PNL TOTAL CA: CPT | Performed by: INTERNAL MEDICINE

## 2023-04-10 PROCEDURE — 99999 PR PBB SHADOW E&M-EST. PATIENT-LVL IV: CPT | Mod: PBBFAC,,, | Performed by: INTERNAL MEDICINE

## 2023-04-10 PROCEDURE — 1160F RVW MEDS BY RX/DR IN RCRD: CPT | Mod: CPTII,S$GLB,, | Performed by: INTERNAL MEDICINE

## 2023-04-10 PROCEDURE — 3079F PR MOST RECENT DIASTOLIC BLOOD PRESSURE 80-89 MM HG: ICD-10-PCS | Mod: CPTII,S$GLB,, | Performed by: INTERNAL MEDICINE

## 2023-04-10 PROCEDURE — 1160F PR REVIEW ALL MEDS BY PRESCRIBER/CLIN PHARMACIST DOCUMENTED: ICD-10-PCS | Mod: CPTII,S$GLB,, | Performed by: INTERNAL MEDICINE

## 2023-04-10 PROCEDURE — 1159F PR MEDICATION LIST DOCUMENTED IN MEDICAL RECORD: ICD-10-PCS | Mod: CPTII,S$GLB,, | Performed by: INTERNAL MEDICINE

## 2023-04-10 PROCEDURE — 99396 PR PREVENTIVE VISIT,EST,40-64: ICD-10-PCS | Mod: S$GLB,,, | Performed by: INTERNAL MEDICINE

## 2023-04-10 PROCEDURE — 36415 COLL VENOUS BLD VENIPUNCTURE: CPT | Performed by: INTERNAL MEDICINE

## 2023-04-10 PROCEDURE — 99396 PREV VISIT EST AGE 40-64: CPT | Mod: S$GLB,,, | Performed by: INTERNAL MEDICINE

## 2023-04-10 PROCEDURE — 3079F DIAST BP 80-89 MM HG: CPT | Mod: CPTII,S$GLB,, | Performed by: INTERNAL MEDICINE

## 2023-04-10 PROCEDURE — 3008F BODY MASS INDEX DOCD: CPT | Mod: CPTII,S$GLB,, | Performed by: INTERNAL MEDICINE

## 2023-04-10 PROCEDURE — 99999 PR PBB SHADOW E&M-EST. PATIENT-LVL IV: ICD-10-PCS | Mod: PBBFAC,,, | Performed by: INTERNAL MEDICINE

## 2023-04-10 PROCEDURE — 3008F PR BODY MASS INDEX (BMI) DOCUMENTED: ICD-10-PCS | Mod: CPTII,S$GLB,, | Performed by: INTERNAL MEDICINE

## 2023-04-10 PROCEDURE — 83735 ASSAY OF MAGNESIUM: CPT | Performed by: INTERNAL MEDICINE

## 2023-04-10 PROCEDURE — 84443 ASSAY THYROID STIM HORMONE: CPT | Performed by: INTERNAL MEDICINE

## 2023-04-10 NOTE — PROGRESS NOTES
INTERNAL MEDICINE ESTABLISHED PATIENT VISIT NOTE    Subjective:     Chief Complaint: Annual Exam       Patient ID: Adrienne Andrew is a 47 y.o. female with MICHELLE resolved on last check, hx GI issues c neg w/u for diarrhea in the past and seen by GI but now resolved, hx R shoulder surgery in 2020, hx uterine fibroids s/p hysterectomy c BSO, cystocele/rectocele followed by Dr. Solano, last seen by me 10/2021, here today for annual exam.    Was seen by ENT last month for issues with sinus pressure with associated dizziness.  Had allergy testing done showing allergy to dog, cat, Alternaria, ragweed, and dust mites.  Yet not tried Astelin that he recommended.  Has been using the Neti pot as needed.    Had annual labs done by Dr. Rock in October which were normal.    Today with complaint of PVCs over the past few months.  States symptoms seem more frequent over the past few weeks and occurring daily.  Symptoms worse at night.  Is usually pretty good about staying hydrated.  Denies excessive caffeine intake, drinks 1 cup of coffee a day.  Rarely takes decongestants and states the last time she took Sudafed was several weeks ago and only took a half dose.  Has a home EKG monitor and states she has been having PVCs.    Past Medical History:  Past Medical History:   Diagnosis Date    Anemia     History of robot-assisted laparoscopic hysterectomy 06/23/2021    Hormone replacement therapy (HRT)     PONV (postoperative nausea and vomiting)        Home Medications:  Prior to Admission medications    Medication Sig Start Date End Date Taking? Authorizing Provider   azelastine (ASTELIN) 137 mcg (0.1 %) nasal spray 1 spray (137 mcg total) by Nasal route 2 (two) times daily. 3/27/23 3/26/24 Yes Jarrett Reynolds MD   COMPOUND HORMONE REPLACEMENT Take by mouth once daily. PROGESTERONE  ([facility])   Yes Historical Provider   estradioL (ESTRACE) 2 MG tablet Take 1 tablet (2 mg total) by mouth every morning. 10/11/22  Yes  Diana Rock MD   prasterone, dhea, (INTRAROSA) 6.5 mg Inst Insert 1 suppository vaginally every night for 30 days and then twice weekly 10/11/22  Yes    spironolactone (ALDACTONE) 50 MG tablet take 1 tablet by mouth twice daily 12/14/22  Yes    valACYclovir (VALTREX) 500 MG tablet Take 1 tablet by mouth twice a day 12/5/22  Yes    amitriptyline (ELAVIL) 10 MG tablet Take 1 tablet (10 mg total) by mouth nightly as needed for Insomnia.  Patient not taking: No sig reported 8/27/21 8/27/22  Alison Barton NP       Allergies:  Review of patient's allergies indicates:   Allergen Reactions    Percocet [oxycodone-acetaminophen] Nausea And Vomiting     Has not tried antiemetic with narcotics.        Social History:  Social History     Tobacco Use    Smoking status: Never    Smokeless tobacco: Never   Substance Use Topics    Alcohol use: Not Currently     Alcohol/week: 0.0 standard drinks     Comment: socially    Drug use: No        Review of Systems   Constitutional:  Negative for activity change and unexpected weight change.   HENT:  Negative for hearing loss, rhinorrhea and trouble swallowing.    Eyes:  Negative for discharge and visual disturbance.   Respiratory:  Negative for chest tightness and wheezing.    Cardiovascular:  Positive for palpitations. Negative for chest pain.   Gastrointestinal:  Negative for blood in stool, constipation, diarrhea and vomiting.   Endocrine: Negative for polydipsia and polyuria.   Genitourinary:  Negative for difficulty urinating, dysuria, hematuria and menstrual problem.   Musculoskeletal:  Negative for arthralgias, joint swelling and neck pain.   Neurological:  Negative for headaches.   Psychiatric/Behavioral:  Negative for dysphoric mood.        Health Maintenance:     Immunizations:   Influenza declined  TDap 1/2015  prevnar at 67  Shingrix rec at 52  COVID vaccine recommended, pt declined, Risks and benefits were discussed with patient.     Cancer Screening:  PAP:  "4/2021, NILM c neg EMB, s/p hyst, cervix removed, path benign (had benign fibroids and endometriomas of the ovaries)  Mammogram:  5/2022 benign  Colonoscopy:  rec now, patient opted for fit kit for now.  Will plan for colonoscopy at 52, sooner if needed    Objective:   /86 (BP Location: Right arm, Patient Position: Sitting, BP Method: Medium (Manual))   Pulse 75   Ht 5' 1" (1.549 m)   Wt 53 kg (116 lb 13.5 oz)   LMP 06/13/2021 Comment: DVH / BSO  6-23-21  SpO2 99%   BMI 22.08 kg/m²        General: AAO x3, no apparent distress  HEENT: PERRL, OP clear  CV: RRR, no m/r/g  Pulm: Lungs CTAB, no crackles, no wheezes  Abd: s/NT/ND +BS  Extremities: no c/c/e    Labs:     Lab Results   Component Value Date    WBC 6.22 10/31/2022    HGB 14.4 10/31/2022    HCT 42.4 10/31/2022    MCV 93 10/31/2022     10/31/2022     Sodium   Date Value Ref Range Status   10/31/2022 137 136 - 145 mmol/L Final     Potassium   Date Value Ref Range Status   10/31/2022 4.5 3.5 - 5.1 mmol/L Final     Chloride   Date Value Ref Range Status   10/31/2022 106 95 - 110 mmol/L Final     CO2   Date Value Ref Range Status   10/31/2022 25 23 - 29 mmol/L Final     Glucose   Date Value Ref Range Status   10/31/2022 88 70 - 110 mg/dL Final     BUN   Date Value Ref Range Status   10/31/2022 13 6 - 20 mg/dL Final     Creatinine   Date Value Ref Range Status   10/31/2022 1.0 0.5 - 1.4 mg/dL Final     Calcium   Date Value Ref Range Status   10/31/2022 9.1 8.7 - 10.5 mg/dL Final     Total Protein   Date Value Ref Range Status   10/31/2022 6.8 6.0 - 8.4 g/dL Final     Albumin   Date Value Ref Range Status   10/31/2022 3.9 3.5 - 5.2 g/dL Final     Total Bilirubin   Date Value Ref Range Status   10/31/2022 0.6 0.1 - 1.0 mg/dL Final     Comment:     For infants and newborns, interpretation of results should be based  on gestational age, weight and in agreement with clinical  observations.    Premature Infant recommended reference ranges:  Up to 24 " hours.............<8.0 mg/dL  Up to 48 hours............<12.0 mg/dL  3-5 days..................<15.0 mg/dL  6-29 days.................<15.0 mg/dL       Alkaline Phosphatase   Date Value Ref Range Status   10/31/2022 37 (L) 55 - 135 U/L Final     AST   Date Value Ref Range Status   10/31/2022 17 10 - 40 U/L Final     ALT   Date Value Ref Range Status   10/31/2022 21 10 - 44 U/L Final     Anion Gap   Date Value Ref Range Status   10/31/2022 6 (L) 8 - 16 mmol/L Final     eGFR if    Date Value Ref Range Status   10/05/2021 >60 >60 mL/min/1.73 m^2 Final     eGFR if non    Date Value Ref Range Status   10/05/2021 >60 >60 mL/min/1.73 m^2 Final     Comment:     Calculation used to obtain the estimated glomerular filtration  rate (eGFR) is the CKD-EPI equation.        Lab Results   Component Value Date    HGBA1C 5.0 10/31/2022     Lab Results   Component Value Date    LDLCALC 59.0 (L) 10/31/2022     Lab Results   Component Value Date    TSH 1.712 05/31/2022         Assessment/Plan     Adrienne was seen today for annual exam.    Diagnoses and all orders for this visit:    Visit for annual health examination  History and physical exam completed.  Health maintenance reviewed as above.  Annual labs completed in October via Gynecology.    Blood pressures in prehypertensive range.  Will monitor.  Discussed low-salt intake.  -     Basic Metabolic Panel; Future  -     TSH; Future  -     Magnesium; Future    Palpitations  Appears consistent with PVCs.  Will check Holter monitor to see how frequently they are occurring to determine if treatment needed.  Consider echo pending the results of the Holter monitor.  Will also check electrolytes since patient is on Aldactone.  Recent CBC within normal limits.  Will also check TSH.    Discussed avoidance of any over-the-counter decongestants which can worsen her symptoms.    -     Basic Metabolic Panel; Future  -     TSH; Future  -     Holter monitor - 48 hour;  Future    Screen for colon cancer  -     Fecal Immunochemical Test (iFOBT); Future    Screening mammogram, encounter for  -     Mammo Digital Screening Bilat w/ [place]; Future        HM as above  RTC in 12 months, sooner if needed.  Labs today    Christine Venegas MD  Department of Internal Medicine - [facility]  04/10/2023

## 2023-04-11 ENCOUNTER — PATIENT MESSAGE (OUTPATIENT)
Dept: ADMINISTRATIVE | Facility: HOSPITAL | Age: 48
End: 2023-04-11
Payer: COMMERCIAL

## 2023-04-12 ENCOUNTER — PATIENT MESSAGE (OUTPATIENT)
Dept: OPTOMETRY | Facility: CLINIC | Age: 48
End: 2023-04-12
Payer: COMMERCIAL

## 2023-04-21 ENCOUNTER — PATIENT MESSAGE (OUTPATIENT)
Dept: ADMINISTRATIVE | Facility: HOSPITAL | Age: 48
End: 2023-04-21
Payer: COMMERCIAL

## 2023-05-12 ENCOUNTER — TELEPHONE (OUTPATIENT)
Dept: OTOLARYNGOLOGY | Facility: CLINIC | Age: 48
End: 2023-05-12
Payer: COMMERCIAL

## 2023-05-29 ENCOUNTER — HOSPITAL ENCOUNTER (OUTPATIENT)
Dept: RADIOLOGY | Facility: HOSPITAL | Age: 48
Discharge: HOME | End: 2023-05-29
Attending: INTERNAL MEDICINE
Payer: COMMERCIAL

## 2023-05-29 DIAGNOSIS — Z12.31 SCREENING MAMMOGRAM, ENCOUNTER FOR: ICD-10-CM

## 2023-05-29 PROCEDURE — 77067 MAMMO DIGITAL SCREENING BILAT WITH TOMO: ICD-10-PCS | Mod: 26,,, | Performed by: RADIOLOGY

## 2023-05-29 PROCEDURE — 77067 SCR MAMMO BI INCL CAD: CPT | Mod: TC

## 2023-05-29 PROCEDURE — 77067 SCR MAMMO BI INCL CAD: CPT | Mod: 26,,, | Performed by: RADIOLOGY

## 2023-05-29 PROCEDURE — 77063 BREAST TOMOSYNTHESIS BI: CPT | Mod: 26,,, | Performed by: RADIOLOGY

## 2023-05-29 PROCEDURE — 77063 MAMMO DIGITAL SCREENING BILAT WITH TOMO: ICD-10-PCS | Mod: 26,,, | Performed by: RADIOLOGY

## 2023-05-31 RX ORDER — AMOXICILLIN AND CLAVULANATE POTASSIUM 875; 125 MG/1; MG/1
1 TABLET, FILM COATED ORAL EVERY 12 HOURS
Qty: 10 TABLET | Refills: 0 | Status: SHIPPED | OUTPATIENT
Start: 2023-05-31 | End: 2023-06-05

## 2023-07-11 DIAGNOSIS — N95.1 SYMPTOMATIC MENOPAUSAL OR FEMALE CLIMACTERIC STATES: ICD-10-CM

## 2023-07-11 DIAGNOSIS — Z13.21 SCREENING FOR MALNUTRITION: ICD-10-CM

## 2023-07-11 DIAGNOSIS — Z00.00 ROUTINE GENERAL MEDICAL EXAMINATION AT A HEALTH CARE FACILITY: Primary | ICD-10-CM

## 2023-10-13 ENCOUNTER — LAB VISIT (OUTPATIENT)
Dept: LAB | Facility: OTHER | Age: 48
End: 2023-10-13
Attending: OBSTETRICS & GYNECOLOGY
Payer: COMMERCIAL

## 2023-10-13 DIAGNOSIS — N95.1 SYMPTOMATIC MENOPAUSAL OR FEMALE CLIMACTERIC STATES: ICD-10-CM

## 2023-10-13 DIAGNOSIS — Z00.00 ROUTINE GENERAL MEDICAL EXAMINATION AT A HEALTH CARE FACILITY: ICD-10-CM

## 2023-10-13 DIAGNOSIS — Z13.21 SCREENING FOR MALNUTRITION: ICD-10-CM

## 2023-10-13 LAB
25(OH)D3+25(OH)D2 SERPL-MCNC: 79 NG/ML (ref 30–96)
ALBUMIN SERPL BCP-MCNC: 4.1 G/DL (ref 3.5–5.2)
ALP SERPL-CCNC: 42 U/L (ref 55–135)
ALT SERPL W/O P-5'-P-CCNC: 22 U/L (ref 10–44)
ANION GAP SERPL CALC-SCNC: 12 MMOL/L (ref 8–16)
AST SERPL-CCNC: 24 U/L (ref 10–40)
BASOPHILS # BLD AUTO: 0.04 K/UL (ref 0–0.2)
BASOPHILS NFR BLD: 0.4 % (ref 0–1.9)
BILIRUB SERPL-MCNC: 0.7 MG/DL (ref 0.1–1)
BUN SERPL-MCNC: 10 MG/DL (ref 6–20)
CALCIUM SERPL-MCNC: 9.2 MG/DL (ref 8.7–10.5)
CHLORIDE SERPL-SCNC: 105 MMOL/L (ref 95–110)
CHOLEST SERPL-MCNC: 148 MG/DL (ref 120–199)
CHOLEST/HDLC SERPL: 2.2 {RATIO} (ref 2–5)
CO2 SERPL-SCNC: 22 MMOL/L (ref 23–29)
CREAT SERPL-MCNC: 1 MG/DL (ref 0.5–1.4)
DHEA-S SERPL-MCNC: 106.4 UG/DL (ref 56.2–282.9)
DIFFERENTIAL METHOD: ABNORMAL
EOSINOPHIL # BLD AUTO: 0.1 K/UL (ref 0–0.5)
EOSINOPHIL NFR BLD: 1.1 % (ref 0–8)
ERYTHROCYTE [DISTWIDTH] IN BLOOD BY AUTOMATED COUNT: 12.7 % (ref 11.5–14.5)
EST. GFR  (NO RACE VARIABLE): >60 ML/MIN/1.73 M^2
ESTIMATED AVG GLUCOSE: 97 MG/DL (ref 68–131)
FOLATE SERPL-MCNC: 12.2 NG/ML (ref 4–24)
GLUCOSE SERPL-MCNC: 82 MG/DL (ref 70–110)
HBA1C MFR BLD: 5 % (ref 4–5.6)
HCT VFR BLD AUTO: 43.3 % (ref 37–48.5)
HDLC SERPL-MCNC: 66 MG/DL (ref 40–75)
HDLC SERPL: 44.6 % (ref 20–50)
HGB BLD-MCNC: 14.3 G/DL (ref 12–16)
IMM GRANULOCYTES # BLD AUTO: 0.03 K/UL (ref 0–0.04)
IMM GRANULOCYTES NFR BLD AUTO: 0.3 % (ref 0–0.5)
LDLC SERPL CALC-MCNC: 67.6 MG/DL (ref 63–159)
LYMPHOCYTES # BLD AUTO: 1.7 K/UL (ref 1–4.8)
LYMPHOCYTES NFR BLD: 18.5 % (ref 18–48)
MCH RBC QN AUTO: 30.9 PG (ref 27–31)
MCHC RBC AUTO-ENTMCNC: 33 G/DL (ref 32–36)
MCV RBC AUTO: 94 FL (ref 82–98)
MONOCYTES # BLD AUTO: 0.5 K/UL (ref 0.3–1)
MONOCYTES NFR BLD: 5.3 % (ref 4–15)
NEUTROPHILS # BLD AUTO: 6.7 K/UL (ref 1.8–7.7)
NEUTROPHILS NFR BLD: 74.4 % (ref 38–73)
NONHDLC SERPL-MCNC: 82 MG/DL
NRBC BLD-RTO: 0 /100 WBC
PLATELET # BLD AUTO: 290 K/UL (ref 150–450)
PMV BLD AUTO: 11.1 FL (ref 9.2–12.9)
POTASSIUM SERPL-SCNC: 4 MMOL/L (ref 3.5–5.1)
PROT SERPL-MCNC: 6.9 G/DL (ref 6–8.4)
RBC # BLD AUTO: 4.63 M/UL (ref 4–5.4)
SODIUM SERPL-SCNC: 139 MMOL/L (ref 136–145)
T4 FREE SERPL-MCNC: 1.1 NG/DL (ref 0.71–1.51)
TRIGL SERPL-MCNC: 72 MG/DL (ref 30–150)
TSH SERPL DL<=0.005 MIU/L-ACNC: 1.05 UIU/ML (ref 0.4–4)
VIT B12 SERPL-MCNC: 407 PG/ML (ref 210–950)
WBC # BLD AUTO: 9.04 K/UL (ref 3.9–12.7)

## 2023-10-13 PROCEDURE — 83036 HEMOGLOBIN GLYCOSYLATED A1C: CPT | Performed by: OBSTETRICS & GYNECOLOGY

## 2023-10-13 PROCEDURE — 80061 LIPID PANEL: CPT | Performed by: OBSTETRICS & GYNECOLOGY

## 2023-10-13 PROCEDURE — 85025 COMPLETE CBC W/AUTO DIFF WBC: CPT | Performed by: OBSTETRICS & GYNECOLOGY

## 2023-10-13 PROCEDURE — 82627 DEHYDROEPIANDROSTERONE: CPT | Performed by: OBSTETRICS & GYNECOLOGY

## 2023-10-13 PROCEDURE — 36415 COLL VENOUS BLD VENIPUNCTURE: CPT | Performed by: OBSTETRICS & GYNECOLOGY

## 2023-10-13 PROCEDURE — 82746 ASSAY OF FOLIC ACID SERUM: CPT | Performed by: OBSTETRICS & GYNECOLOGY

## 2023-10-13 PROCEDURE — 84439 ASSAY OF FREE THYROXINE: CPT | Performed by: OBSTETRICS & GYNECOLOGY

## 2023-10-13 PROCEDURE — 84443 ASSAY THYROID STIM HORMONE: CPT | Performed by: OBSTETRICS & GYNECOLOGY

## 2023-10-13 PROCEDURE — 82607 VITAMIN B-12: CPT | Performed by: OBSTETRICS & GYNECOLOGY

## 2023-10-13 PROCEDURE — 82306 VITAMIN D 25 HYDROXY: CPT | Performed by: OBSTETRICS & GYNECOLOGY

## 2023-10-13 PROCEDURE — 80053 COMPREHEN METABOLIC PANEL: CPT | Performed by: OBSTETRICS & GYNECOLOGY

## 2023-10-13 PROCEDURE — 84402 ASSAY OF FREE TESTOSTERONE: CPT | Performed by: OBSTETRICS & GYNECOLOGY

## 2023-10-13 PROCEDURE — 82670 ASSAY OF TOTAL ESTRADIOL: CPT | Performed by: OBSTETRICS & GYNECOLOGY

## 2023-10-13 PROCEDURE — 84144 ASSAY OF PROGESTERONE: CPT | Performed by: OBSTETRICS & GYNECOLOGY

## 2023-10-13 PROCEDURE — 84481 FREE ASSAY (FT-3): CPT | Performed by: OBSTETRICS & GYNECOLOGY

## 2023-10-14 LAB
ESTRADIOL SERPL-MCNC: 63 PG/ML
PROGEST SERPL-MCNC: 8.3 NG/ML
T3FREE SERPL-MCNC: 3.1 PG/ML (ref 2.3–4.2)

## 2023-10-16 LAB — TESTOST FREE SERPL-MCNC: 2.7 PG/ML

## 2023-11-20 NOTE — LETTER
May 25, 2020      Christine Venegas MD  [address]  [facility] [address]           [facility]  [address] [address]  Phone: [phone]  Fax: [phone]          Patient: Adrienne Andrew   MR Number: [id number]   YOB: 1975   Date of Visit: 5/25/2020       Dear Dr. Kelly:    Thank you for referring Adrienne Andrew to me for evaluation. Attached you will find relevant portions of my assessment and plan of care.    If you have questions, please do not hesitate to call me. I look forward to following Adrienne Andrew along with you.    Sincerely,    Diana Rock MD    Enclosure  CC:  No Recipients    If you would like to receive this communication electronically, please contact [email or web address] or [phone] to request more information on [facility] access.    For providers and/or their staff who would like to refer a patient to [facility], please contact us through our one-stop-shop provider referral line, [facility], at [phone].    If you feel you have received this communication in error or would no longer like to receive these types of communications, please e-mail [email or web address]         
Cardiac

## 2023-12-26 RX ORDER — ESTRADIOL 2 MG/1
2 TABLET ORAL EVERY MORNING
Qty: 90 TABLET | Refills: 3 | OUTPATIENT
Start: 2023-12-26

## 2024-02-20 ENCOUNTER — CLINICAL SUPPORT (OUTPATIENT)
Dept: REHABILITATION | Facility: HOSPITAL | Age: 49
End: 2024-02-20
Payer: COMMERCIAL

## 2024-02-20 DIAGNOSIS — M25.622 DECREASED ROM OF LEFT ELBOW: ICD-10-CM

## 2024-02-20 DIAGNOSIS — M79.632 LEFT FOREARM PAIN: Primary | ICD-10-CM

## 2024-02-20 PROCEDURE — 97110 THERAPEUTIC EXERCISES: CPT | Performed by: PHYSICAL THERAPIST

## 2024-02-20 PROCEDURE — 97161 PT EVAL LOW COMPLEX 20 MIN: CPT | Performed by: PHYSICAL THERAPIST

## 2024-02-22 ENCOUNTER — CLINICAL SUPPORT (OUTPATIENT)
Dept: REHABILITATION | Facility: HOSPITAL | Age: 49
End: 2024-02-22
Payer: COMMERCIAL

## 2024-02-22 DIAGNOSIS — M25.622 DECREASED ROM OF LEFT ELBOW: ICD-10-CM

## 2024-02-22 DIAGNOSIS — M79.632 LEFT FOREARM PAIN: Primary | ICD-10-CM

## 2024-02-22 PROCEDURE — 97140 MANUAL THERAPY 1/> REGIONS: CPT | Performed by: PHYSICAL THERAPIST

## 2024-02-22 PROCEDURE — 97110 THERAPEUTIC EXERCISES: CPT | Performed by: PHYSICAL THERAPIST

## 2024-02-22 PROCEDURE — 97112 NEUROMUSCULAR REEDUCATION: CPT | Performed by: PHYSICAL THERAPIST

## 2024-02-23 DIAGNOSIS — M79.632 LEFT FOREARM PAIN: Primary | ICD-10-CM

## 2024-02-23 NOTE — PROGRESS NOTES
[facility]  Physical Therapy Direct Access Initial Evaluation      Name: Adrienne Andrew  Clinic Number: [id number]    Therapy Diagnosis:   Encounter Diagnoses   Name Primary?    Left forearm pain Yes    Decreased ROM of left elbow        Reason for Visit:: L forearm pain  Evaluation Date: 2/20/2024  Authorization Period Expiration: 12/31/24  Plan of Care Expiration: 4/16/24   Visit # / Visits authorized: 1/ 1      Time In: 1000  Time Out: 1045  Total Billable Time: 45 minutes    Precautions: Standard    Medical History      Current Care Team:  Primary Care Provider:  NO  Specialty Provider: NO    Date of Last Physical Examination: NA    Past Medical History:  Past Medical History:   Diagnosis Date    Anemia     History of robot-assisted laparoscopic hysterectomy 06/23/2021    Hormone replacement therapy (HRT)     PONV (postoperative nausea and vomiting)        Past Surgical History:   has a past surgical history that includes [place] tooth extraction; Dilation and curettage of uterus; Fixation of tendon (Right, 1/16/2020); Arthroscopy of shoulder with decompression of subacromial space (Right, 1/16/2020); Arthroscopic debridement of shoulder (Right, 1/16/2020); Vaginal delivery (2004, 2006, 2009); Robot-assisted laparoscopic abdominal hysterectomy using da Alexis Xi (Bilateral, 6/23/2021); Cystoscopy (N/A, 6/23/2021); Robot-assisted laparoscopic salpingo-oophorectomy using da Alexis Xi (Bilateral, 6/23/2021); Robot-assisted laparoscopic lysis of adhesions using da Alexis Xi (6/23/2021); Hysterectomy (06/23/2021); and Oophorectomy (06/23/2021).  Other Surgeries Absent not stated above: NO    Medications:  Adrienne has a current medication list which includes the following prescription(s): azelastine, COMPOUND HORMONE REPLACEMENT, estradiol, estradiol, intrarosa, intrarosa, spironolactone, spironolactone, and valacyclovir.  Other Medications not stated above: NO    Which of the following  medications have you taken in the last week?   Aspirin: NO  Tylenol: NO  Antiinflammatories (Advil/Motrin/Ibuprofen/Aleve etc): NO  Stomach Ulcer medications: NO  Vitamins/mineral supplements: NO  Herbals/Remedies: NO  Other: NO    How much caffeinated coffee or caffeine containing beverages do you drink per day: N/A    Tobacco Use: How many packs do you smoke per day: 0 for 0 years  Quit? NO    Alcohol Intake: How many days a week do you drink alcohol?: 0  Drinks consumed in one sittin    Has anyone in your immediate family (parents, brothers, sisters) ever been treated for any of the following?  Diabetes No  Cancer No  Heart disease No  Alcoholism (chemical dependency)  No  High blood pressure No  Depression No  Stroke No  Kidney disease No  Inflammatory Arthritis (Rheumatoid, Ankylosing) No    Allergies:  Review of patient's allergies indicates:   Allergen Reactions    Percocet [oxycodone-acetaminophen] Nausea And Vomiting     Has not tried antiemetic with narcotics.       Latex Sensitive: NO  Other Allergies not stated above: {NO    Imaging: none:     Subjective      Date of onset: 5 weeks ago  History of current condition - Adrienne reports: L anterior forearm pain following gym work out. Felt like a muscle strain, and she has stopped lifting weights since the start of the symptoms with no improvement.     Have you recently noted:  weight loss/gain NO   joint/muscle swelling NO  nausea/vomiting NO  easy bruising NO  dizziness/lightheadedness NO  excessive bleeding NO  fatigue NO  difficulty breathing NO  weakness NO  regular cough NO  fever/chills/sweats NO  arm/leg swelling NO  numbness or tingling YES L forearm in wrist and up to shoulder  heart racing in your chest NO  tremors NO  difficulty swelling NO  seizures NO  Heartburn/indigestion NO  double vision NO  Constipation/diarrhea NO  loss of vision NO  blood in stools NO  eye redness NO  post menopause NO  skin rash NO  problems urinating (difficulty  starting, painful etc.) NO  problems sleeping NO  urinary incontinence NO  sexual difficulties NO  blood in the urine NO  night sweats NO  pregnant or think you might be pregnant N/A  hearing problems  NO  stress at home or work NO    Prior Therapy: none for this condition  Social History:  lives with their family  Occupation: homemaker  Leisure Activities: weight lifting  Prior Level of Function: pain-free with all ADLs and recreational activities  Current Level of Function: pain with turning wrist, gripping, stretching arm straight     Pain:  Current 2/10, worst 6/10, best 1/10   Location: left forearm   Description: Burning and Tight  Aggravating Factors: stretching arm straight, gripping  Easing Factors: rest    Pts goals: return to weight lifting, ADLs pain-free    Objective      Objective    Observation:scapular medial border winging on L    Passive Range of Motion:   Elbow Left Right   Flexion WNL WNL   Extension WNL WNL   Supination 70** 80   Pronation 70 70   ** painful       Active Range of Motion:   Elbow Left Right   Flexion WNL WNL   Extension WNL WNL   Supination 70** 80   Pronation 70 70     Strength:  Elbow Left Right   Flexion WNL WNL   Extension WNL WNL   Supination WNL WNL   Pronation 4/5 5/5    equal equal       Special Tests:  Varus stress test (-) (-)   Valgus Stress test (-) (-)     ULTT for median nerve (+) on L    Joint Mobility: normal    Palpation: TTP at L anterior forearm     Sensation: intact    Flexibility: intact    Functional Limitation Score - FOTO forearm Survey    Therapist reviewed FOTO scores for Adrienne Andrew on 2/20/2024.   FOTO documents entered into [employer] - see Media section.    Limitation Score: -% - next visit  Category: Carrying         Treatment       Treatment Time In: 1000  Treatment Time Out: 1045  Total Treatment time separate from Evaluation: 15 minutes    Adrienne received the following treatments:  THERAPEUTIC EXERCISES to develop strength, endurance,  ROM, flexibility, and posture for 10 minutes including resisted supination; median nerve glides; B ER GTB   MANUAL THERAPY TECHNIQUES including Joint mobilizations were applied to thoracic spine, cervical spine for 5 minutes.: thoracic spine manipulation, Gr V; cervical side glides, C5-6 opening on L    Patient Education and Home Exercises      Education provided:   - avoid painful positions, nature of nerve pathology    Written Home Exercises Provided: Patient instructed to cont prior HEP.  Exercises were reviewed and Adrienne was able to demonstrate them prior to the end of the session.  Adrienne demonstrated good  understanding of the education provided.     See EMR under Patient Instructions for exercises provided prior visit.    Assessment       Adrienne is a 48 y.o. female referred to outpatient Physical Therapy with a medical diagnosis of L forearm pain. Pt presents with painful resisted pronation on L, (+) median nerve ULTT on L, scapular weakness and pain. Current condition is likely due to over use of forearm musculature and associate adverse neural tissue tension of median nerve. ULTT improved by end of session, suggesting a positive outcome will be achieved in therapy.     Pt prognosis is Good.   Pt will benefit from skilled outpatient Physical Therapy to address the deficits stated above and in the chart below, provide pt/family education, and to maximize pt's level of independence.     Plan of care discussed with patient: Yes  Pt's spiritual, cultural and educational needs considered and patient is agreeable to the plan of care and goals as stated below:     Anticipated Barriers for therapy: none    Medical Necessity is demonstrated by the following  History  Co-morbidities and personal factors that may impact the plan of care Co-morbidities:   Prior surgical histroy    Personal Factors:   no deficits     moderate   Examination  Body Structures and Functions, activity limitations and participation  restrictions that may impact the plan of care Body Regions:   upper extremities    Body Systems:    gross symmetry  ROM  strength  motor control  motor learning    Participation Restrictions:   none    Activity limitations:   Learning and applying knowledge  no deficits    General Tasks and Commands  no deficits    Communication  no deficits    Mobility  lifting and carrying objects    Self care  no deficits    Domestic Life  no deficits    Interactions/Relationships  no deficits    Life Areas  no deficits    Community and Social Life  no deficits         moderate   Clinical Presentation evolving clinical presentation with changing clinical characteristics moderate   Decision Making/ Complexity Score: moderate     Goals:  Short Term Goals: 2-4 weeks   Pt will be 50% compliant with HEP performance.  Decrease worst pain from 6/10 to 4/10.   Improve ULTT for median nerve from 80 degrees to 40 degrees elbow flexion.    Long Term Goals: 8 weeks   Pt will be able to perform ADLs without pain.   Pt will demonstrate (-) ULTT for median nerve.  Pt will be able to return to weight lifting at the gym.     Plan      Plan of care Certification: 2/20/2024 to 4/16/24.    Attestation Statement:  I evaluated Adrienne  on 2/22/2024 and recommend the following plan of care:  Outpatient Physical Therapy 2 times weekly for 8 weeks to include the following interventions: Manual Therapy, Neuromuscular Re-ed, Therapeutic Activities, Therapeutic Exercise, and modalities as needed, including dry needling. .    Caio Goel, PT

## 2024-02-26 ENCOUNTER — CLINICAL SUPPORT (OUTPATIENT)
Dept: REHABILITATION | Facility: HOSPITAL | Age: 49
End: 2024-02-26
Payer: COMMERCIAL

## 2024-02-26 DIAGNOSIS — M25.622 DECREASED ROM OF LEFT ELBOW: ICD-10-CM

## 2024-02-26 DIAGNOSIS — M79.632 LEFT FOREARM PAIN: Primary | ICD-10-CM

## 2024-02-26 PROCEDURE — 97110 THERAPEUTIC EXERCISES: CPT | Performed by: PHYSICAL THERAPIST

## 2024-02-26 PROCEDURE — 97112 NEUROMUSCULAR REEDUCATION: CPT | Performed by: PHYSICAL THERAPIST

## 2024-02-26 PROCEDURE — 97140 MANUAL THERAPY 1/> REGIONS: CPT | Performed by: PHYSICAL THERAPIST

## 2024-02-26 NOTE — PROGRESS NOTES
Physical Therapy Daily Treatment Note     Name: Adrienne Anrdew  Clinic Number: [id number]    Therapy Diagnosis:   Encounter Diagnoses   Name Primary?    Left forearm pain Yes    Decreased ROM of left elbow      Physician: Order, Paper    Visit Date: 2/22/2024  Reason for Visit:: L forearm pain  Evaluation Date: 2/20/2024  Authorization Period Expiration: 12/31/24  Plan of Care Expiration: 4/16/24   Visit # / Visits authorized: 2/ 20        Time In: 1100  Time Out: 1155  Total Billable Time: 55 minutes     Precautions: Standard    Subjective     Pt reports: her L shoulder is very sore today. Thinks it is connected to her forearm.    She was compliant with home exercise program.  Response to previous treatment: none yet  Functional change: none yet    Pain: 4/10  Location: left forearm and shoulder      Objective     Adrienne received therapeutic exercises to develop strength, endurance, ROM, and flexibility for 15 minutes including:  Seated wrist extension  Seated wrist radial deviation  Walk outs, IR/ER    Adrienne received the following manual therapy techniques: Joint mobilizations were applied to the: thoracic spine, cervical spine  for 24 minutes, including:  Thoracic manipulation, Gr V;  Cervical side glides, Gr III;  Posterior GH mobilizations on L, Gr III    Dry needling, 4 1 inch needles, to anterior forearm muscle belly, 8 mins, with electrical stimulation; no adverse effects noted.     Adrienne participated in neuromuscular re-education activities to improve: Balance, Coordination, Kinesthetic, and Sense for 8 minutes. The following activities were included:  Median nerve glides;  B ER GTB for scapular setting    Adrienne participated in dynamic functional therapeutic activities to improve functional performance for   minutes, including:      Home Exercises Provided and Patient Education Provided     Education provided:   - continue with nerve glides    Written Home Exercises Provided: Patient instructed  to cont prior HEP.  Exercises were reviewed and Adrienne was able to demonstrate them prior to the end of the session.  Adrienne demonstrated good  understanding of the education provided.     See EMR under Patient Instructions for exercises provided prior visit.    Assessment   L forearm pain  Dry needling performed to L anterior forearm to modulate pain with mixed results. Location of symptoms have changed since initial evaluation, suggesting proximal joint involvement. Pt will follow up Monday .     Adrienne Is progressing well towards her goals.   Pt prognosis is Good.     Pt will continue to benefit from skilled outpatient physical therapy to address the deficits listed in the problem list box on initial evaluation, provide pt/family education and to maximize pt's level of independence in the home and community environment.     Pt's spiritual, cultural and educational needs considered and pt agreeable to plan of care and goals.    Anticipated barriers to physical therapy: none    Goals:  Short Term Goals: 2-4 weeks   Pt will be 50% compliant with HEP performance. (Progressing, not met)  Decrease worst pain from 6/10 to 4/10.  (Progressing, not met)  Improve ULTT for median nerve from 80 degrees to 40 degrees elbow flexion.(Progressing, not met)     Long Term Goals: 8 weeks   Pt will be able to perform ADLs without pain. (Progressing, not met)  Pt will demonstrate (-) ULTT for median nerve. (Progressing, not met)  Pt will be able to return to weight lifting at the gym. (Progressing, not met)    Plan     Assess shoulder and neck in greater detail.     Caio Goel, PT

## 2024-02-26 NOTE — PROGRESS NOTES
Physical Therapy Daily Treatment Note     Name: Adrienne Andrew  Clinic Number: [id number]    Therapy Diagnosis:   Encounter Diagnoses   Name Primary?    Left forearm pain Yes    Decreased ROM of left elbow      Physician: Order, Paper    Visit Date: 2/26/2024  Reason for Visit:: L forearm pain  Evaluation Date: 2/20/2024  Authorization Period Expiration: 12/31/24  Plan of Care Expiration: 4/16/24   Visit # / Visits authorized: 2/ 1        Time In: 1050  Time Out: 1152  Total Billable Time: 54 minutes     Precautions: Standard    Subjective     Pt reports: She felt flared up over the weekend. She noticed some posterior arm symptoms going into her lateral forearm. She has been feeling better with the medial nerve glide and added some radial nerve glides.    She was compliant with home exercise program.  Response to previous treatment: none yet  Functional change: none yet    Pain: 4/10 with arm forward flexion with elbow extension  Location: left forearm and shoulder      Objective     Adrienne received therapeutic exercises to develop strength, endurance, ROM, flexibility, and posture for 10 minutes including:    Range of motion assessment  L shoulder deviation out of sagittal during flexion  Mild impaired R cervical rotation  L upper extremity symptoms with L cervical sidebend  Limited elbow flexion with forearm pronation  Updated HEP instruction x 4 minutes    Adrienne received the following manual therapy techniques: Joint mobilizations and Soft tissue Mobilization were applied to the: upper quarter for 18 minutes, including:  Supine thoracic manipulation  L humeroradial gapping manipulation supine  1st rib pin and stretch  Infraspinatus pin and stretch    Adrienne participated in neuromuscular re-education activities to improve: Coordination, Kinesthetic, Sense, Proprioception, and Posture for 26 minutes. The following activities were included:    Adverse neural tissue tension testing  + median nerve at 80  degrees, improved to 60 post manual  + radial nerve at 10 degrees, improved to 20 post manual    Chin tuck with cuff biofeedback- 10 x 10s holds to 30 mmHg  Serratus wall slide with YTB 15x  Prone middle trap L2 15x 5s holds  Prone shoulder IR at 90 15x 5s hold    Adrienne participated in dynamic functional therapeutic activities to improve functional performance for 00  minutes, including:      Home Exercises Provided and Patient Education Provided     Education provided:   - Upper quarter motor control and potential impact on nerve irritability    Written Home Exercises Provided: yes.  Exercises were reviewed and Adrienne was able to demonstrate them prior to the end of the session.  Adrienne demonstrated good  understanding of the education provided.     See EMR under Patient Instructions for exercises provided 2/22/2024.    Assessment     Adrienne Andrew tolerated PT session well with moderate  complaints of pain or discomfort that were improved with manual and exercise. Patient continues to have increased nerve irritability on the L with motor control impairments in cervical spine and shoulder girdle that likely sensitize sympotms. Objective findings are improving with joint mobility, motor control, and adverse neural tissue tension symptoms .  Updated home exercises were issued during today's visit. Adrienne demonstrated good understanding of new exercises and will continue to progress at home until next follow-up.       Adrienne Is progressing well towards her goals.   Pt prognosis is Good.     Pt will continue to benefit from skilled outpatient physical therapy to address the deficits listed in the problem list box on initial evaluation, provide pt/family education and to maximize pt's level of independence in the home and community environment.     Pt's spiritual, cultural and educational needs considered and pt agreeable to plan of care and goals.    Anticipated barriers to physical therapy: None    Goals:    Short Term Goals: 2-4 weeks   Pt will be 50% compliant with HEP performance.  Decrease worst pain from 6/10 to 4/10.   Improve ULTT for median nerve from 80 degrees to 40 degrees elbow flexion.     Long Term Goals: 8 weeks   Pt will be able to perform ADLs without pain.   Pt will demonstrate (-) ULTT for median nerve.  Pt will be able to return to weight lifting at the gym.     Plan     Continue to address upper quarter biomechanics and adverse neural tissue tension symptoms    Justin Tran, PT

## 2024-02-27 RX ORDER — SULCONAZOLE NITRATE 10 MG/G
CREAM TOPICAL DAILY
Qty: 60 G | Refills: 3 | Status: SHIPPED | OUTPATIENT
Start: 2024-02-27 | End: 2024-02-29 | Stop reason: SDUPTHER

## 2024-02-29 RX ORDER — SULCONAZOLE NITRATE 10 MG/G
CREAM TOPICAL DAILY
Qty: 60 G | Refills: 3 | Status: SHIPPED | OUTPATIENT
Start: 2024-02-29 | End: 2024-03-30

## 2024-03-04 ENCOUNTER — CLINICAL SUPPORT (OUTPATIENT)
Dept: REHABILITATION | Facility: HOSPITAL | Age: 49
End: 2024-03-04
Payer: COMMERCIAL

## 2024-03-04 DIAGNOSIS — M25.622 DECREASED ROM OF LEFT ELBOW: ICD-10-CM

## 2024-03-04 DIAGNOSIS — M79.632 LEFT FOREARM PAIN: Primary | ICD-10-CM

## 2024-03-04 PROCEDURE — 97112 NEUROMUSCULAR REEDUCATION: CPT | Performed by: PHYSICAL THERAPIST

## 2024-03-04 PROCEDURE — 97140 MANUAL THERAPY 1/> REGIONS: CPT | Performed by: PHYSICAL THERAPIST

## 2024-03-04 NOTE — PROGRESS NOTES
Physical Therapy Daily Treatment Note     Name: Adrienne Andrew  Clinic Number: [id number]    Therapy Diagnosis:   Encounter Diagnoses   Name Primary?    Left forearm pain Yes    Decreased ROM of left elbow      Physician: Order, Paper    Visit Date: 3/4/2024  Reason for Visit:: L forearm pain  Evaluation Date: 2/20/2024  Authorization Period Expiration: 12/31/24  Plan of Care Expiration: 4/16/24   Visit # / Visits authorized: 3/20        Time In: 1010  Time Out: 1130  Total Billable Time: 64 minutes     Precautions: Standard    Subjective     Pt reports: She felt much better for about 24 hours after the last treatment, but then she woke up flared up again. She continues to notice symptoms with putting contacts in and when pulling under her car seat to move the seat.    She was compliant with home exercise program.  Response to previous treatment: none yet  Functional change: none yet    Pain: 4/10 with arm forward flexion with elbow flexion  Location: left forearm and shoulder      Objective     Adrienne received therapeutic exercises to develop strength, endurance, ROM, flexibility, and posture for 00 minutes including:        Adrienne received the following manual therapy techniques: Joint mobilizations and Soft tissue Mobilization were applied to the: upper quarter for 23 minutes, including:  Supine thoracic manipulation  Seated CT manipulation  L humeroradial gapping       Adrienne participated in neuromuscular re-education activities to improve: Coordination, Kinesthetic, Sense, Proprioception, and Posture for 26 minutes. The following activities were included:    Prone middle trap L2 15x 5s holds  Prone shoulder IR at 90 15x 5s hold  Prone shoulder ER at 90 15x 5s hold  Shoulder flexion with isometric ER 2 x 15  Quadruped rocking 15x      Not today:  Chin tuck with cuff biofeedback- 10 x 10s holds to 30 mmHg      Adrienne participated in dynamic functional therapeutic activities to improve functional  performance for 00  minutes, including:      BFR IPC x 15 minutes    Home Exercises Provided and Patient Education Provided     Education provided:   - Upper quarter motor control and potential impact on nerve irritability    Written Home Exercises Provided: yes.  Exercises were reviewed and Adrienne was able to demonstrate them prior to the end of the session.  Adrienne demonstrated good  understanding of the education provided.     See EMR under Patient Instructions for exercises provided 2/22/2024.    Assessment     Adrienne Andrew tolerated PT session well with moderate  complaints of pain or discomfort that were somewhat improved with manual and exercise. Patient continues to have increased nerve irritability on the L with motor control impairments in cervical spine and shoulder girdle that likely sensitize sympotms. Objective findings are improving with joint mobility, motor control, and adverse neural tissue tension symptoms .  Updated home exercises were issued during today's visit. Adrienne demonstrated good understanding of new exercises and will continue to progress at home until next follow-up.       Adrienne Is progressing well towards her goals.   Pt prognosis is Good.     Pt will continue to benefit from skilled outpatient physical therapy to address the deficits listed in the problem list box on initial evaluation, provide pt/family education and to maximize pt's level of independence in the home and community environment.     Pt's spiritual, cultural and educational needs considered and pt agreeable to plan of care and goals.    Anticipated barriers to physical therapy: None    Goals:   Short Term Goals: 2-4 weeks   Pt will be 50% compliant with HEP performance.  Decrease worst pain from 6/10 to 4/10.   Improve ULTT for median nerve from 80 degrees to 40 degrees elbow flexion.     Long Term Goals: 8 weeks   Pt will be able to perform ADLs without pain.   Pt will demonstrate (-) ULTT for median  nerve.  Pt will be able to return to weight lifting at the gym.     Plan     Continue to address upper quarter biomechanics and adverse neural tissue tension symptoms    Justin Tran, PT

## 2024-03-18 RX ORDER — KETOCONAZOLE 20 MG/G
CREAM TOPICAL DAILY
Qty: 30 G | Refills: 0 | Status: SHIPPED | OUTPATIENT
Start: 2024-03-18

## 2024-03-21 ENCOUNTER — TELEPHONE (OUTPATIENT)
Dept: SPORTS MEDICINE | Facility: CLINIC | Age: 49
End: 2024-03-21
Payer: COMMERCIAL

## 2024-03-21 NOTE — TELEPHONE ENCOUNTER
Left message for patient to call back to schedule appointment for left arm/elbow pain with Dr Rene.

## 2024-04-08 ENCOUNTER — OFFICE VISIT (OUTPATIENT)
Dept: SPORTS MEDICINE | Facility: CLINIC | Age: 49
End: 2024-04-08
Payer: COMMERCIAL

## 2024-04-08 ENCOUNTER — HOSPITAL ENCOUNTER (OUTPATIENT)
Dept: RADIOLOGY | Facility: HOSPITAL | Age: 49
Discharge: HOME | End: 2024-04-08
Attending: ORTHOPAEDIC SURGERY
Payer: COMMERCIAL

## 2024-04-08 VITALS
BODY MASS INDEX: 24.16 KG/M2 | DIASTOLIC BLOOD PRESSURE: 81 MMHG | WEIGHT: 127.88 LBS | HEART RATE: 84 BPM | SYSTOLIC BLOOD PRESSURE: 123 MMHG

## 2024-04-08 DIAGNOSIS — M25.522 LEFT ELBOW PAIN: ICD-10-CM

## 2024-04-08 DIAGNOSIS — M77.12 LEFT LATERAL EPICONDYLITIS: Primary | ICD-10-CM

## 2024-04-08 PROCEDURE — 20605 DRAIN/INJ JOINT/BURSA W/O US: CPT | Mod: LT,S$GLB,, | Performed by: ORTHOPAEDIC SURGERY

## 2024-04-08 PROCEDURE — 73080 X-RAY EXAM OF ELBOW: CPT | Mod: TC,LT

## 2024-04-08 PROCEDURE — 99999 PR PBB SHADOW E&M-EST. PATIENT-LVL III: CPT | Mod: PBBFAC,,, | Performed by: ORTHOPAEDIC SURGERY

## 2024-04-08 PROCEDURE — 1159F MED LIST DOCD IN RCRD: CPT | Mod: CPTII,S$GLB,, | Performed by: ORTHOPAEDIC SURGERY

## 2024-04-08 PROCEDURE — 3008F BODY MASS INDEX DOCD: CPT | Mod: CPTII,S$GLB,, | Performed by: ORTHOPAEDIC SURGERY

## 2024-04-08 PROCEDURE — 3079F DIAST BP 80-89 MM HG: CPT | Mod: CPTII,S$GLB,, | Performed by: ORTHOPAEDIC SURGERY

## 2024-04-08 PROCEDURE — 73080 X-RAY EXAM OF ELBOW: CPT | Mod: 26,LT,, | Performed by: RADIOLOGY

## 2024-04-08 PROCEDURE — 99204 OFFICE O/P NEW MOD 45 MIN: CPT | Mod: 25,S$GLB,, | Performed by: ORTHOPAEDIC SURGERY

## 2024-04-08 PROCEDURE — 3074F SYST BP LT 130 MM HG: CPT | Mod: CPTII,S$GLB,, | Performed by: ORTHOPAEDIC SURGERY

## 2024-04-08 RX ORDER — CELECOXIB 200 MG/1
200 CAPSULE ORAL DAILY
Qty: 30 CAPSULE | Refills: 0 | Status: SHIPPED | OUTPATIENT
Start: 2024-04-08

## 2024-04-08 RX ORDER — TRIAMCINOLONE ACETONIDE 40 MG/ML
40 INJECTION, SUSPENSION INTRA-ARTICULAR; INTRAMUSCULAR
Status: DISCONTINUED | OUTPATIENT
Start: 2024-04-08 | End: 2024-04-08 | Stop reason: HOSPADM

## 2024-04-08 RX ADMIN — TRIAMCINOLONE ACETONIDE 40 MG: 40 INJECTION, SUSPENSION INTRA-ARTICULAR; INTRAMUSCULAR at 03:04

## 2024-04-08 NOTE — PROGRESS NOTES
CC: LEFT elbow pain    48 y.o. RIGHT hand dominant Female presents as a new patient to me. She  works as an artist. Complaint is left elbow pain x 4 months. Atraumatic onset. She states that around that time 4 mos ago she was starting to increase he frequency of working out again. She is an active person, and in the past has done Cross Fit. She states the pain has been gradual in onset and denies any acute injury/trauma. She denies any previous elbow injury or pain in the past.  Pain localizes laterally over the lateral humeral epicondyle and also over the volar mobile wad. Worse with repetitive gripping activities, resisted forearm supination and certain positions when lifting. Better with rest. Denies neck pain or radicular symptoms. Treatment thus far has included activity modifications, rest, and oral medication. She also has attended formal PT with treatment specifically for possible nerve involvement by her account but the pain has persisted.  Here today to discuss diagnosis and treatment options.     No numbness or tingling reported.  Chief complaint of pain.  No instability complaints.  No mechanical symptoms.    I have operated on her , Lizbeth Andrew MD    PMHx notable for anemia.   Negative for tobacco.   Negative for diabetes. Last A1C: 5.0 10/13/23    PAST MEDICAL HISTORY:   Past Medical History:   Diagnosis Date    Anemia     History of robot-assisted laparoscopic hysterectomy 06/23/2021    Hormone replacement therapy (HRT)     PONV (postoperative nausea and vomiting)      PAST SURGICAL HISTORY:  Past Surgical History:   Procedure Laterality Date    ARTHROSCOPIC DEBRIDEMENT OF SHOULDER Right 1/16/2020    Procedure: PARTIAL THICKNESS CUFF DEBRIDEMENT, SHOULDER, ARTHROSCOPIC;  Surgeon: Charmaine Barr MD;  Location: [facility];  Service: Orthopedics;  Laterality: Right;    ARTHROSCOPY OF SHOULDER WITH DECOMPRESSION OF SUBACROMIAL SPACE Right 1/16/2020    Procedure: ARTHROSCOPY, SHOULDER, WITH  SUBACROMIAL SPACE DECOMPRESSION EXTENSIVE DEBRIDMENT;  Surgeon: Charmaine Barr MD;  Location: [facility] OR;  Service: Orthopedics;  Laterality: Right;    CYSTOSCOPY N/A 6/23/2021    Procedure: CYSTOSCOPY;  Surgeon: Diana Rock MD;  Location: [facility];  Service: OB/GYN;  Laterality: N/A;    DILATION AND CURETTAGE OF UTERUS      AB    FIXATION OF TENDON Right 1/16/2020    Procedure: FIXATION, TENDON, subpectoral bicep tenodesis;  Surgeon: Charmaine Barr MD;  Location: [facility] OR;  Service: Orthopedics;  Laterality: Right;  FIXATION, TENDON, bicep tenodesis    HYSTERECTOMY  06/23/2021    OOPHORECTOMY  06/23/2021    ROBOT-ASSISTED LAPAROSCOPIC ABDOMINAL HYSTERECTOMY USING DA ANNMARIE XI Bilateral 6/23/2021    Procedure: XI ROBOTIC HYSTERECTOMY;  Surgeon: Diana Rock MD;  Location: [facility];  Service: OB/GYN;  Laterality: Bilateral;    ROBOT-ASSISTED LAPAROSCOPIC LYSIS OF ADHESIONS USING DA ANNAMRIE XI  6/23/2021    Procedure: XI ROBOTIC LYSIS, ADHESIONS;  Surgeon: Diana Rock MD;  Location: [facility];  Service: OB/GYN;;    ROBOT-ASSISTED LAPAROSCOPIC SALPINGO-OOPHORECTOMY USING DA ANNMARIE XI Bilateral 6/23/2021    Procedure: XI ROBOTIC SALPINGO-OOPHORECTOMY;  Surgeon: Diana Rock MD;  Location: [facility];  Service: OB/GYN;  Laterality: Bilateral;    VAGINAL DELIVERY  2004, 2006, 2009    Boy/Boy/Girl     WISDOM TOOTH EXTRACTION       FAMILY HISTORY:  Family History   Problem Relation Age of Onset    Hyperthyroidism Mother     No Known Problems Brother     No Known Problems Brother     No Known Problems Brother     Amblyopia Neg Hx     Blindness Neg Hx     Cancer Neg Hx     Diabetes Neg Hx     Cataracts Neg Hx     Glaucoma Neg Hx     Hypertension Neg Hx     Macular degeneration Neg Hx     Retinal detachment Neg Hx     Strabismus Neg Hx     Stroke Neg Hx     Thyroid disease Neg Hx     Celiac disease Neg Hx     Colon cancer Neg Hx     Crohn's disease Neg Hx     Breast cancer Neg Hx     Ovarian  cancer Neg Hx      MEDICATIONS:    Current Outpatient Medications:     COMPOUND HORMONE REPLACEMENT, Take by mouth once daily. PROGESTERONE  (MED QUEST), Disp: , Rfl:     estradioL (ESTRACE) 2 MG tablet, Take 1 tablet by mouth every morning, Disp: 90 tablet, Rfl: 3    ketoconazole (NIZORAL) 2 % cream, Apply topically once daily., Disp: 30 g, Rfl: 0    prasterone, dhea, (INTRAROSA) 6.5 mg Inst, Insert 1 suppository vaginally twice weekly, Disp: 28 each, Rfl: 3    spironolactone (ALDACTONE) 50 MG tablet, Take 1 tablet by mouth daily, Disp: 180 tablet, Rfl: 1    valACYclovir (VALTREX) 500 MG tablet, Take 1 tablet by mouth twice a day, Disp: 14 tablet, Rfl: 2    azelastine (ASTELIN) 137 mcg (0.1 %) nasal spray, 1 spray (137 mcg total) by Nasal route 2 (two) times daily., Disp: 30 mL, Rfl: 6    celecoxib (CELEBREX) 200 MG capsule, Take 1 capsule (200 mg total) by mouth once daily., Disp: 30 capsule, Rfl: 0    estradioL (ESTRACE) 2 MG tablet, Take 1 tablet (2 mg total) by mouth every morning., Disp: 90 tablet, Rfl: 3    prasterone, dhea, (INTRAROSA) 6.5 mg Inst, Insert 1 suppository vaginally every night for 30 days and then twice weekly, Disp: 28 each, Rfl: 6    spironolactone (ALDACTONE) 50 MG tablet, take 1 tablet by mouth twice daily, Disp: 180 tablet, Rfl: 2    spironolactone (ALDACTONE) 50 MG tablet, Take 1 tablet (50 mg total) by mouth 2 (two) times daily., Disp: 180 tablet, Rfl: 1    spironolactone (ALDACTONE) 50 MG tablet, Take 1 tablet by mouth daily, Disp: 90 tablet, Rfl: 1    sulconazole (EXELDERM) 1 % cream, Apply topically once daily., Disp: 60 g, Rfl: 3    ALLERGIES:  Review of patient's allergies indicates:   Allergen Reactions    Percocet [oxycodone-acetaminophen] Nausea And Vomiting     Has not tried antiemetic with narcotics.         REVIEW OF SYSTEMS:  Constitution: Negative. Negative for chills, fever and night sweats.    Hematologic/Lymphatic: Negative for bleeding problem. Does not  bruise/bleed easily.   Skin: Negative for dry skin, itching and rash.   Musculoskeletal: Negative for falls. Positive for left elbow pain and muscle weakness.     All other review of symptoms were reviewed and found to be noncontributory.     PHYSICAL EXAMINATION:  Vitals:  /81   Pulse 84   Wt 58 kg (127 lb 13.9 oz)   LMP 06/13/2021 Comment: ANAID / ADRIANA  6-23-21  BMI 24.16 kg/m²    General: Well-developed well-nourished 48 y.o. femalein no acute distress   Cardiovascular: Regular rhythm by palpation of distal pulse, normal color and temperature, no concerning varicosities on symptomatic side   Lungs: No labored breathing or wheezing appreciated   Neuro: Alert and oriented ×3   Psychiatric: well oriented to person, place and time, demonstrates normal mood and affect   Skin: No rashes, lesions or ulcers, normal temperature, turgor, and texture on uninvolved extremity    Ortho/SPM Exam  Examination of the left elbow demonstrates some pain to palpation over the lateral epicondyle and common extensor bundle.  Really no tenderness over the volar mobile wad and radial tunnel region.  She does have typical pain with resisted wrist extension.  Some pain specifically also with resisted and active forearm supination.  Intact distal biceps to hook test.  She does have some pain over the distal biceps insertional region. Neg Maudsley's test.  Full elbow and forearm range of motion.  Stable to varus and valgus stress.      IMAGING:  Xrays including AP, Lateral and Radial Capitallar views of the left elbow are ordered / images reviewed by me:   No significant abnormalities.    ASSESSMENT:      ICD-10-CM ICD-9-CM   1. Left lateral epicondylitis  M77.12 726.32   2. Left elbow pain  M25.522 719.42       PLAN:     -Findings and treatment options were discussed with the patient and her .  Mixed clinical picture.  Pain on exam appears more predominant over the lateral epicondyle and most consistent with lateral  epicondylitis.  She also has some forearm based radial pain which may be more indicative of a distal biceps insertional tendinosis/partial tearing.  Less common scenario of radial tunnel syndrome was discussed.  To sort this out, I have recommended a corticosteroid injection over the lateral epicondyle and common extensor bundle.  That would be helpful diagnostically and also hopefully therapeutically.  Discussed a self-directed eccentric strengthening program with a Flex bar.  She already has that.  We can hold off on formal therapy for now.  If pain is persistent despite the corticosteroid injection, next step would be MRI to further assess the distal biceps region and radial tunnel area.  -RTC as needed  -Script given for Celebrex  -All questions answered      L lateral epicondyleIntermediate Joint Aspiration/Injection: Left elbow: L elbow    Date/Time: 4/8/2024 3:30 PM    Performed by: BRANDIE Rene MD  Authorized by: BRANDIE Rene MD    Timeout: Prior to procedure the correct patient, procedure, and site was verified      Location:  Elbow  Site:  L elbow  Prep: Patient was prepped and draped in usual sterile fashion    Needle size:  22 G  Medications:  40 mg triamcinolone acetonide 40 mg/mL  Patient tolerance:  Patient tolerated the procedure well with no immediate complications

## 2024-05-31 ENCOUNTER — PATIENT MESSAGE (OUTPATIENT)
Dept: OPTOMETRY | Facility: CLINIC | Age: 49
End: 2024-05-31
Payer: COMMERCIAL

## 2024-06-04 ENCOUNTER — OFFICE VISIT (OUTPATIENT)
Dept: OPTOMETRY | Facility: CLINIC | Age: 49
End: 2024-06-04
Payer: COMMERCIAL

## 2024-06-04 DIAGNOSIS — H52.13 MYOPIA OF BOTH EYES: Primary | ICD-10-CM

## 2024-06-04 DIAGNOSIS — Z46.0 FITTING AND ADJUSTMENT OF SPECTACLES AND CONTACT LENSES: Primary | ICD-10-CM

## 2024-06-04 PROCEDURE — 92014 COMPRE OPH EXAM EST PT 1/>: CPT | Mod: S$GLB,,, | Performed by: OPTOMETRIST

## 2024-06-04 PROCEDURE — 92015 DETERMINE REFRACTIVE STATE: CPT | Mod: S$GLB,,, | Performed by: OPTOMETRIST

## 2024-06-04 PROCEDURE — 92310 CONTACT LENS FITTING OU: CPT | Mod: CSM,S$GLB,, | Performed by: OPTOMETRIST

## 2024-06-04 PROCEDURE — 99999 PR PBB SHADOW E&M-EST. PATIENT-LVL III: CPT | Mod: PBBFAC,,, | Performed by: OPTOMETRIST

## 2024-06-10 ENCOUNTER — PATIENT MESSAGE (OUTPATIENT)
Dept: INTERNAL MEDICINE | Facility: CLINIC | Age: 49
End: 2024-06-10
Payer: COMMERCIAL

## 2024-06-19 DIAGNOSIS — I49.3 FREQUENT PVCS: Primary | ICD-10-CM

## 2024-06-20 ENCOUNTER — HOSPITAL ENCOUNTER (OUTPATIENT)
Dept: CARDIOLOGY | Facility: HOSPITAL | Age: 49
Discharge: HOME | End: 2024-06-20
Attending: INTERNAL MEDICINE
Payer: COMMERCIAL

## 2024-06-20 ENCOUNTER — CLINICAL SUPPORT (OUTPATIENT)
Dept: CARDIOLOGY | Facility: HOSPITAL | Age: 49
End: 2024-06-20
Attending: INTERNAL MEDICINE
Payer: COMMERCIAL

## 2024-06-20 ENCOUNTER — OFFICE VISIT (OUTPATIENT)
Dept: ELECTROPHYSIOLOGY | Facility: CLINIC | Age: 49
End: 2024-06-20
Payer: COMMERCIAL

## 2024-06-20 VITALS
BODY MASS INDEX: 23.6 KG/M2 | HEART RATE: 70 BPM | WEIGHT: 125 LBS | DIASTOLIC BLOOD PRESSURE: 70 MMHG | SYSTOLIC BLOOD PRESSURE: 130 MMHG | HEIGHT: 61 IN

## 2024-06-20 VITALS
DIASTOLIC BLOOD PRESSURE: 72 MMHG | HEART RATE: 71 BPM | HEIGHT: 61 IN | SYSTOLIC BLOOD PRESSURE: 128 MMHG | WEIGHT: 125.88 LBS | BODY MASS INDEX: 23.77 KG/M2

## 2024-06-20 DIAGNOSIS — I49.3 FREQUENT PVCS: ICD-10-CM

## 2024-06-20 DIAGNOSIS — I49.3 PREMATURE VENTRICULAR CONTRACTIONS (PVCS) (VPCS): Primary | ICD-10-CM

## 2024-06-20 DIAGNOSIS — R00.2 PALPITATIONS: ICD-10-CM

## 2024-06-20 LAB
ASCENDING AORTA: 3.13 CM
AV INDEX (PROSTH): 0.81
AV MEAN GRADIENT: 4 MMHG
AV PEAK GRADIENT: 8 MMHG
AV VALVE AREA BY VELOCITY RATIO: 2.19 CM²
AV VALVE AREA: 2.18 CM²
AV VELOCITY RATIO: 0.81
BSA FOR ECHO PROCEDURE: 1.56 M2
CV ECHO LV RWT: 0.31 CM
DOP CALC AO PEAK VEL: 1.45 M/S
DOP CALC AO VTI: 29.81 CM
DOP CALC LVOT AREA: 2.7 CM2
DOP CALC LVOT DIAMETER: 1.85 CM
DOP CALC LVOT PEAK VEL: 1.18 M/S
DOP CALC LVOT STROKE VOLUME: 64.91 CM3
DOP CALCLVOT PEAK VEL VTI: 24.16 CM
E WAVE DECELERATION TIME: 176.41 MSEC
E/A RATIO: 1.67
E/E' RATIO: 6 M/S
ECHO LV POSTERIOR WALL: 0.69 CM (ref 0.6–1.1)
FRACTIONAL SHORTENING: 32 % (ref 28–44)
INTERVENTRICULAR SEPTUM: 0.64 CM (ref 0.6–1.1)
IVRT: 79.92 MSEC
LA MAJOR: 4.25 CM
LA MINOR: 4.18 CM
LA WIDTH: 3.59 CM
LEFT ATRIUM SIZE: 2.69 CM
LEFT ATRIUM VOLUME INDEX MOD: 26.6 ML/M2
LEFT ATRIUM VOLUME INDEX: 22.3 ML/M2
LEFT ATRIUM VOLUME MOD: 41.2 CM3
LEFT ATRIUM VOLUME: 34.6 CM3
LEFT INTERNAL DIMENSION IN SYSTOLE: 3.04 CM (ref 2.1–4)
LEFT VENTRICLE DIASTOLIC VOLUME INDEX: 58.59 ML/M2
LEFT VENTRICLE DIASTOLIC VOLUME: 90.81 ML
LEFT VENTRICLE MASS INDEX: 57 G/M2
LEFT VENTRICLE SYSTOLIC VOLUME INDEX: 23.2 ML/M2
LEFT VENTRICLE SYSTOLIC VOLUME: 36.01 ML
LEFT VENTRICULAR INTERNAL DIMENSION IN DIASTOLE: 4.47 CM (ref 3.5–6)
LEFT VENTRICULAR MASS: 88.62 G
LV LATERAL E/E' RATIO: 6.21 M/S
LV SEPTAL E/E' RATIO: 5.8 M/S
MV A" WAVE DURATION": 17.7 MSEC
MV PEAK A VEL: 0.52 M/S
MV PEAK E VEL: 0.87 M/S
MV STENOSIS PRESSURE HALF TIME: 51.16 MS
MV VALVE AREA P 1/2 METHOD: 4.3 CM2
OHS QRS DURATION: 76 MS
OHS QTC CALCULATION: 415 MS
PULM VEIN S/D RATIO: 1.39
PV PEAK D VEL: 0.33 M/S
PV PEAK S VEL: 0.46 M/S
RA MAJOR: 3.95 CM
RA PRESSURE ESTIMATED: 3 MMHG
RA WIDTH: 3.2 CM
RIGHT VENTRICLE DIASTOLIC BASEL DIMENSION: 2.9 CM
SINUS: 3.36 CM
STJ: 2.82 CM
TDI LATERAL: 0.14 M/S
TDI SEPTAL: 0.15 M/S
TDI: 0.15 M/S
TRICUSPID ANNULAR PLANE SYSTOLIC EXCURSION: 2.36 CM
Z-SCORE OF LEFT VENTRICULAR DIMENSION IN END DIASTOLE: -0.04
Z-SCORE OF LEFT VENTRICULAR DIMENSION IN END SYSTOLE: 0.69

## 2024-06-20 PROCEDURE — 99204 OFFICE O/P NEW MOD 45 MIN: CPT | Mod: S$GLB,,, | Performed by: INTERNAL MEDICINE

## 2024-06-20 PROCEDURE — 93306 TTE W/DOPPLER COMPLETE: CPT

## 2024-06-20 PROCEDURE — 93010 ELECTROCARDIOGRAM REPORT: CPT | Mod: S$GLB,,, | Performed by: INTERNAL MEDICINE

## 2024-06-20 PROCEDURE — 3008F BODY MASS INDEX DOCD: CPT | Mod: CPTII,S$GLB,, | Performed by: INTERNAL MEDICINE

## 2024-06-20 PROCEDURE — 3074F SYST BP LT 130 MM HG: CPT | Mod: CPTII,S$GLB,, | Performed by: INTERNAL MEDICINE

## 2024-06-20 PROCEDURE — 93306 TTE W/DOPPLER COMPLETE: CPT | Mod: 26,,, | Performed by: INTERNAL MEDICINE

## 2024-06-20 PROCEDURE — 93226 XTRNL ECG REC<48 HR SCAN A/R: CPT

## 2024-06-20 PROCEDURE — 93005 ELECTROCARDIOGRAM TRACING: CPT | Mod: S$GLB,,, | Performed by: INTERNAL MEDICINE

## 2024-06-20 PROCEDURE — 1159F MED LIST DOCD IN RCRD: CPT | Mod: CPTII,S$GLB,, | Performed by: INTERNAL MEDICINE

## 2024-06-20 PROCEDURE — 3078F DIAST BP <80 MM HG: CPT | Mod: CPTII,S$GLB,, | Performed by: INTERNAL MEDICINE

## 2024-06-20 PROCEDURE — 99999 PR PBB SHADOW E&M-EST. PATIENT-LVL III: CPT | Mod: PBBFAC,,, | Performed by: INTERNAL MEDICINE

## 2024-06-20 RX ORDER — METOPROLOL TARTRATE 25 MG/1
12.5 TABLET, FILM COATED ORAL 2 TIMES DAILY
Qty: 90 TABLET | Refills: 3 | Status: SHIPPED | OUTPATIENT
Start: 2024-06-20 | End: 2025-06-20

## 2024-06-20 NOTE — PROGRESS NOTES
"Subjective   Patient ID:  Adrienne Andrew is a 48 y.o. female who presents for evaluation of Palpitations      HPI 47 yo female with palpitations.  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  A year ago, she noted palpitations, primarily at night, manifesting as skipped beat with "roller coaster feeling." Resolved after a month, resumed last couple of weeks. Now occurring throughout the day. Also notes feeling tired, chest heaviness.    She exercises 5 days a week (weights, stairs, etc) without difficulty. Symptoms actually improve with exercise.  Is diligent with hydration. Perhaps notes slight increase in stress over the past couple of weeks.  Denies syncope.  ECG today reviewed by me, reveals nsr with normal baseline intervals.    Kardia strips available for review, and during symptoms, reveal PVC's.    2 cups of coffee daily.  No alcohol.    Review of Systems   Constitutional: Positive for malaise/fatigue. Negative for fever.   HENT:  Negative for congestion and sore throat.    Cardiovascular:  Positive for palpitations. Negative for chest pain, dyspnea on exertion, irregular heartbeat, leg swelling, near-syncope, orthopnea, paroxysmal nocturnal dyspnea and syncope.   Respiratory:  Negative for cough and shortness of breath.    Gastrointestinal:  Negative for abdominal pain, constipation and diarrhea.   Neurological:  Negative for dizziness, light-headedness and weakness.   Psychiatric/Behavioral:  Negative for depression. The patient is not nervous/anxious.           Objective     Physical Exam  Constitutional:       Appearance: She is well-developed.   Eyes:      General: No scleral icterus.     Conjunctiva/sclera: Conjunctivae normal.   Neck:      Vascular: No JVD.      Trachea: No tracheal deviation.   Cardiovascular:      Rate and Rhythm: Normal rate and regular rhythm.      Chest Wall: PMI is not displaced.   Pulmonary:      Effort: Pulmonary effort is normal. No respiratory distress.      Breath " sounds: Normal breath sounds.   Abdominal:      Palpations: Abdomen is soft.      Tenderness: There is no abdominal tenderness.   Musculoskeletal:         General: No tenderness.   Skin:     General: Skin is warm and dry.      Findings: No rash.   Neurological:      Mental Status: She is alert and oriented to person, place, and time.   Psychiatric:         Behavior: Behavior normal.            Assessment and Plan     1. Premature ventricular contractions (PVCs) (VPCs)    2. Palpitations        Plan:     PVC's, symptomatic. Suspect overall low burden.  Echo with color flow  24 hr holter  Discussed management is directed to symptoms, discussed consideration of beta blocker or calcium channel blocker.  She is inclined to utilize PRN, which I agree with.  Lopressor 12.5 mg BID, after completion of monitor.  F/u in one year.

## 2024-06-21 LAB
OHS CV EVENT MONITOR DAY: 0
OHS CV HOLTER LENGTH DECIMAL HOURS: 24
OHS CV HOLTER LENGTH HOURS: 24
OHS CV HOLTER LENGTH MINUTES: 0
OHS CV HOLTER SINUS AVERAGE HR: 72
OHS CV HOLTER SINUS MAX HR: 152
OHS CV HOLTER SINUS MIN HR: 49

## 2024-06-23 ENCOUNTER — PATIENT MESSAGE (OUTPATIENT)
Dept: OPTOMETRY | Facility: CLINIC | Age: 49
End: 2024-06-23
Payer: COMMERCIAL

## 2024-06-28 ENCOUNTER — TELEPHONE (OUTPATIENT)
Dept: DERMATOLOGY | Facility: CLINIC | Age: 49
End: 2024-06-28
Payer: COMMERCIAL

## 2024-07-01 ENCOUNTER — OFFICE VISIT (OUTPATIENT)
Dept: DERMATOLOGY | Facility: CLINIC | Age: 49
End: 2024-07-01
Payer: COMMERCIAL

## 2024-07-01 DIAGNOSIS — Z12.83 SCREENING EXAM FOR SKIN CANCER: ICD-10-CM

## 2024-07-01 DIAGNOSIS — N95.1 MENOPAUSAL STATE: Primary | ICD-10-CM

## 2024-07-01 DIAGNOSIS — D18.01 CHERRY ANGIOMA: ICD-10-CM

## 2024-07-01 DIAGNOSIS — L81.4 LENTIGINES: Primary | ICD-10-CM

## 2024-07-01 DIAGNOSIS — Z00.00 ROUTINE MEDICAL EXAM: ICD-10-CM

## 2024-07-01 DIAGNOSIS — L81.4 LENTIGO: ICD-10-CM

## 2024-07-01 DIAGNOSIS — L30.9 DERMATITIS: ICD-10-CM

## 2024-07-01 DIAGNOSIS — Z13.21 SCREENING FOR MALNUTRITION: ICD-10-CM

## 2024-07-01 PROCEDURE — 1159F MED LIST DOCD IN RCRD: CPT | Mod: CPTII,S$GLB,, | Performed by: DERMATOLOGY

## 2024-07-01 PROCEDURE — 99214 OFFICE O/P EST MOD 30 MIN: CPT | Mod: S$GLB,,, | Performed by: DERMATOLOGY

## 2024-07-01 PROCEDURE — 99999 PR PBB SHADOW E&M-EST. PATIENT-LVL III: CPT | Mod: PBBFAC,,, | Performed by: DERMATOLOGY

## 2024-07-01 PROCEDURE — G2211 COMPLEX E/M VISIT ADD ON: HCPCS | Mod: S$GLB,,, | Performed by: DERMATOLOGY

## 2024-07-01 RX ORDER — PIMECROLIMUS 10 MG/G
CREAM TOPICAL
Qty: 60 G | Refills: 3 | Status: SHIPPED | OUTPATIENT
Start: 2024-07-01

## 2024-07-01 NOTE — PROGRESS NOTES
Subjective:      Patient ID:  Adrienne Andrew is a 48 y.o. female who presents for   Chief Complaint   Patient presents with    Skin Check     TBSE     Pt presents for TBSE.    Patient with new area of concern:   Location: L side of nose  Duration: x6mo  S/S: red, not healing  Previous treatments: over the counter creams    No h/o nm or mm     Review of Systems   Skin:  Positive for daily sunscreen use and activity-related sunscreen use.     Objective:   Physical Exam   Constitutional: She appears well-developed and well-nourished. No distress.   Neurological: She is alert and oriented to person, place, and time. She is not disoriented.   Psychiatric: She has a normal mood and affect.   Skin:   Areas Examined (abnormalities noted in diagram):   Scalp / Hair Palpated and Inspected  Head / Face Inspection Performed  Neck Inspection Performed  Chest / Axilla Inspection Performed  Abdomen Inspection Performed  Genitals / Buttocks / Groin Inspection Performed  Back Inspection Performed  RUE Inspected  LUE Inspection Performed  RLE Inspected  LLE Inspection Performed  Nails and Digits Inspection Performed     Diagram Legend     Erythematous scaling macule/papule c/w actinic keratosis       Vascular papule c/w angioma      Pigmented verrucoid papule/plaque c/w seborrheic keratosis      Yellow umbilicated papule c/w sebaceous hyperplasia      Irregularly shaped tan macule c/w lentigo     1-2 mm smooth white papules consistent with Milia      Movable subcutaneous cyst with punctum c/w epidermal inclusion cyst      Subcutaneous movable cyst c/w pilar cyst      Firm pink to brown papule c/w dermatofibroma      Pedunculated fleshy papule(s) c/w skin tag(s)      Evenly pigmented macule c/w junctional nevus     Mildly variegated pigmented, slightly irregular-bordered macule c/w mildly atypical nevus      Flesh colored to evenly pigmented papule c/w intradermal nevus       Pink pearly papule/plaque c/w basal cell  carcinoma      Erythematous hyperkeratotic cursted plaque c/w SCC      Surgical scar with no sign of skin cancer recurrence      Open and closed comedones      Inflammatory papules and pustules      Verrucoid papule consistent consistent with wart     Erythematous eczematous patches and plaques     Dystrophic onycholytic nail with subungual debris c/w onychomycosis     Umbilicated papule    Erythematous-base heme-crusted tan verrucoid plaque consistent with inflamed seborrheic keratosis     Erythematous Silvery Scaling Plaque c/w Psoriasis     See annotation      Assessment / Plan:        There are no diagnoses linked to this encounter.         No follow-ups on file.

## 2024-07-01 NOTE — PROGRESS NOTES
Subjective:      Patient ID:  Adrienne Andrew is a 48 y.o. female who presents for   Chief Complaint   Patient presents with    Skin Check     TBSE     47 yo F here for skin check. LOV 7/2018 at which time she was treated for seborrheic dermatitis and had a benign skin exam. Today she has a spot on the right nasal crease that has been present since Feb. She has tried ketoconazole cream and hydrocortisone cream on it which did not make it go away. She is also here for a skin check. She has no history of skin cancer or dysplastic nevi. Her mother had a BCC but otherwise no family history of melanoma. She has spent a lot of time outdoors. She intermittently wears sunscreen now. Denies any other new, growing, changing, bleeding, or symptomatic lesions.           Review of Systems   All other systems reviewed and are negative.      Objective:   Physical Exam   Constitutional: She appears well-developed and well-nourished.   Skin:               Diagram Legend     Erythematous scaling macule/papule c/w actinic keratosis       Vascular papule c/w angioma      Pigmented verrucoid papule/plaque c/w seborrheic keratosis      Yellow umbilicated papule c/w sebaceous hyperplasia      Irregularly shaped tan macule c/w lentigo     1-2 mm smooth white papules consistent with Milia      Movable subcutaneous cyst with punctum c/w epidermal inclusion cyst      Subcutaneous movable cyst c/w pilar cyst      Firm pink to brown papule c/w dermatofibroma      Pedunculated fleshy papule(s) c/w skin tag(s)      Evenly pigmented macule c/w junctional nevus     Mildly variegated pigmented, slightly irregular-bordered macule c/w mildly atypical nevus      Flesh colored to evenly pigmented papule c/w intradermal nevus       Pink pearly papule/plaque c/w basal cell carcinoma      Erythematous hyperkeratotic cursted plaque c/w SCC      Surgical scar with no sign of skin cancer recurrence      Open and closed comedones      Inflammatory  papules and pustules      Verrucoid papule consistent consistent with wart     Erythematous eczematous patches and plaques     Dystrophic onycholytic nail with subungual debris c/w onychomycosis     Umbilicated papule    Erythematous-base heme-crusted tan verrucoid plaque consistent with inflamed seborrheic keratosis     Erythematous Silvery Scaling Plaque c/w Psoriasis     See annotation      Assessment / Plan:        Lentigines  This is a benign hyperpigmented sun induced lesion. Recommend daily sun protection/avoidance and use of at least SPF 30, broad spectrum sunscreen (OTC drug) will reduce the number of new lesions. Treatment of these benign lesions are considered cosmetic.    Cherry angioma  This is a benign vascular lesion. Reassurance given. No treatment required.     Dermatitis  -     pimecrolimus (ELIDEL) 1 % cream; Apply to rash on face daily as needed  Dispense: 60 g; Refill: 3  *if not covered will send 2.5% htc cr    Screening exam for skin cancer  Total body skin examination performed today including at least 12 points as noted in physical examination. No lesions suspicious for malignancy noted.  Recommend daily sun protection/avoidance, use of at least SPF 30, broad spectrum sunscreen (OTC drug), skin self examinations, and routine physician surveillance to optimize early detection             No follow-ups on file. 1 yr

## 2024-07-01 NOTE — PROGRESS NOTES
Subjective:      Patient ID:  Adrienne Andrew is a 48 y.o. female who presents for   Chief Complaint   Patient presents with    Skin Check     TBSE     47 yo F here for skin check. LOV 7/2018 at which time she was treated for seborrheic dermatitis and had a benign skin exam. Today she has a spot on the right nasal crease that has been present since Feb. She has tried ketoconazole cream and hydrocortisone cream on it which did not make it go away. She is also here for a skin check. She has no history of skin cancer or dysplastic nevi. Her mother had a BCC but otherwise no family history of melanoma. She has spent a lot of time outdoors. She intermittently wears sunscreen now. Denies any other new, growing, changing, bleeding, or symptomatic lesions.           Review of Systems   All other systems reviewed and are negative.      Objective:   Physical Exam   Constitutional: She appears well-developed and well-nourished.   Skin:               Diagram Legend     Erythematous scaling macule/papule c/w actinic keratosis       Vascular papule c/w angioma      Pigmented verrucoid papule/plaque c/w seborrheic keratosis      Yellow umbilicated papule c/w sebaceous hyperplasia      Irregularly shaped tan macule c/w lentigo     1-2 mm smooth white papules consistent with Milia      Movable subcutaneous cyst with punctum c/w epidermal inclusion cyst      Subcutaneous movable cyst c/w pilar cyst      Firm pink to brown papule c/w dermatofibroma      Pedunculated fleshy papule(s) c/w skin tag(s)      Evenly pigmented macule c/w junctional nevus     Mildly variegated pigmented, slightly irregular-bordered macule c/w mildly atypical nevus      Flesh colored to evenly pigmented papule c/w intradermal nevus       Pink pearly papule/plaque c/w basal cell carcinoma      Erythematous hyperkeratotic cursted plaque c/w SCC      Surgical scar with no sign of skin cancer recurrence      Open and closed comedones      Inflammatory  papules and pustules      Verrucoid papule consistent consistent with wart     Erythematous eczematous patches and plaques     Dystrophic onycholytic nail with subungual debris c/w onychomycosis     Umbilicated papule    Erythematous-base heme-crusted tan verrucoid plaque consistent with inflamed seborrheic keratosis     Erythematous Silvery Scaling Plaque c/w Psoriasis     See annotation      Assessment / Plan:        Lentigines  Numerous. No concerning lesions on exam.   -Recommend regular sun protection with sunscreen or sun protective clothing    Cherry angioma  R flank.   -Reassured of benign nature    Dermatitis  Seborrheic vs louise oral dermatitis. Not responsive to ketoconazole or hydrocortisone cream.   -   start  pimecrolimus (ELIDEL) 1 % cream; Apply to rash on face daily as needed  Dispense: 60 g; Refill: 3

## 2024-07-29 ENCOUNTER — OFFICE VISIT (OUTPATIENT)
Dept: INTERNAL MEDICINE | Facility: CLINIC | Age: 49
End: 2024-07-29
Payer: COMMERCIAL

## 2024-07-29 VITALS
HEIGHT: 61 IN | BODY MASS INDEX: 23.22 KG/M2 | SYSTOLIC BLOOD PRESSURE: 120 MMHG | DIASTOLIC BLOOD PRESSURE: 80 MMHG | WEIGHT: 123 LBS

## 2024-07-29 DIAGNOSIS — Z12.31 SCREENING MAMMOGRAM, ENCOUNTER FOR: ICD-10-CM

## 2024-07-29 DIAGNOSIS — Z00.00 VISIT FOR ANNUAL HEALTH EXAMINATION: Primary | ICD-10-CM

## 2024-07-29 PROCEDURE — 1159F MED LIST DOCD IN RCRD: CPT | Mod: CPTII,S$GLB,, | Performed by: INTERNAL MEDICINE

## 2024-07-29 PROCEDURE — 99396 PREV VISIT EST AGE 40-64: CPT | Mod: S$GLB,,, | Performed by: INTERNAL MEDICINE

## 2024-07-29 PROCEDURE — 99999 PR PBB SHADOW E&M-EST. PATIENT-LVL IV: CPT | Mod: PBBFAC,,, | Performed by: INTERNAL MEDICINE

## 2024-07-29 PROCEDURE — 3074F SYST BP LT 130 MM HG: CPT | Mod: CPTII,S$GLB,, | Performed by: INTERNAL MEDICINE

## 2024-07-29 PROCEDURE — 1160F RVW MEDS BY RX/DR IN RCRD: CPT | Mod: CPTII,S$GLB,, | Performed by: INTERNAL MEDICINE

## 2024-07-29 PROCEDURE — 3079F DIAST BP 80-89 MM HG: CPT | Mod: CPTII,S$GLB,, | Performed by: INTERNAL MEDICINE

## 2024-07-29 PROCEDURE — 3008F BODY MASS INDEX DOCD: CPT | Mod: CPTII,S$GLB,, | Performed by: INTERNAL MEDICINE

## 2024-07-29 NOTE — PROGRESS NOTES
INTERNAL MEDICINE ESTABLISHED PATIENT VISIT NOTE    Subjective:     Chief Complaint: Annual Exam       Patient ID: Adrienne Andrew is a 48 y.o. female with MICHELLE resolved on last check, hx GI issues c neg w/u for diarrhea in the past and seen by GI but now resolved, hx R shoulder surgery in 2020, hx uterine fibroids s/p hysterectomy c BSO, cystocele/rectocele followed by Dr. Solano, last seen by me 4/2023, here today for annual exam.    To review, last annual exam with me, was having issues with palpitations that appear to be consistent with possible PVCs.  Holter monitor was done this past June which showed 1% PVC burden and very rare PACs.  There was 1 run of EAT lasting for 140 beats most c/w short run of atrial tachycardia.    Echo overall normal.    Was seen by Dr. Lindquist who discussed results and rx'ed Lopressor 12.5 BID prn and to f/u in a year.     States she has not needed to take the BB since she has had less sx.  Felt stressed about her heart but felt reassured after she got all her results back so feels like her sx have been less frequent.    Skin check 7/2024.  Eye exam 6/2024.    HRT still managed by Dr. Rock and had annual labs c her last Oct.    Past Medical History:  Past Medical History:   Diagnosis Date    Anemia     History of robot-assisted laparoscopic hysterectomy 06/23/2021    Hormone replacement therapy (HRT)     PONV (postoperative nausea and vomiting)        Home Medications:  Prior to Admission medications    Medication Sig Start Date End Date Taking? Authorizing Provider   azelastine (ASTELIN) 137 mcg (0.1 %) nasal spray 1 spray (137 mcg total) by Nasal route 2 (two) times daily. 3/27/23 3/26/24  Jarrett Reynolds MD   celecoxib (CELEBREX) 200 MG capsule Take 1 capsule (200 mg total) by mouth once daily. 4/8/24   Santos Chen,    COMPOUND HORMONE REPLACEMENT Take by mouth once daily. PROGESTERONE  ([facility])    Provider, Historical   estradioL (ESTRACE) 2 MG tablet Take 1  tablet (2 mg total) by mouth every morning. 10/11/22   Diana Rock MD   estradioL (ESTRACE) 2 MG tablet Take 1 tablet by mouth every morning 12/20/23      ketoconazole (NIZORAL) 2 % cream Apply topically once daily. 3/18/24   Lizbeth Andrew III., MD   metoprolol tartrate (LOPRESSOR) 25 MG tablet Take ½ tablet (12.5 mg total) by mouth 2 (two) times daily. 6/20/24 6/20/25  Zen Lindquist MD   pimecrolimus (ELIDEL) 1 % cream Apply to rash on face daily as needed 7/1/24   Genny Gray MD   prasterone, dhea, (INTRAROSA) 6.5 mg Inst Insert 1 suppository vaginally every night for 30 days and then twice weekly 10/11/22      prasterone, dhea, (INTRAROSA) 6.5 mg Inst Insert 1 suppository vaginally twice weekly 11/17/23      spironolactone (ALDACTONE) 50 MG tablet take 1 tablet by mouth twice daily 12/14/22      spironolactone (ALDACTONE) 50 MG tablet Take 1 tablet (50 mg total) by mouth 2 (two) times daily. 9/22/23      spironolactone (ALDACTONE) 50 MG tablet Take 1 tablet by mouth daily 2/29/24      spironolactone (ALDACTONE) 50 MG tablet Take 1 tablet by mouth daily 2/29/24      sulconazole (EXELDERM) 1 % cream Apply topically once daily. 2/29/24 3/30/24  Samantha Lopez MD   valACYclovir (VALTREX) 500 MG tablet Take 1 tablet by mouth twice a day 12/5/22          Allergies:  Review of patient's allergies indicates:   Allergen Reactions    Percocet [oxycodone-acetaminophen] Nausea And Vomiting     Has not tried antiemetic with narcotics.        Social History:  Social History     Tobacco Use    Smoking status: Never    Smokeless tobacco: Never   Substance Use Topics    Alcohol use: Not Currently     Alcohol/week: 0.0 standard drinks of alcohol     Comment: socially    Drug use: No        Review of Systems   Constitutional:  Negative for appetite change, chills, fatigue, fever and unexpected weight change.   HENT:  Negative for congestion, hearing loss and rhinorrhea.    Eyes:  Negative for pain and visual  "disturbance.   Respiratory:  Negative for cough, chest tightness, shortness of breath and wheezing.    Cardiovascular:  Negative for chest pain, palpitations and leg swelling.   Gastrointestinal:  Negative for abdominal distention and abdominal pain.   Endocrine: Negative for polydipsia and polyuria.   Genitourinary:  Negative for decreased urine volume, difficulty urinating, dysuria, hematuria and vaginal discharge.   Neurological:  Negative for weakness, numbness and headaches.   Psychiatric/Behavioral:  Negative for behavioral problems and confusion.          Health Maintenance:     Immunizations:   Influenza declined  TDap 1/2015  prevnar at 67  Shingrix rec at 52  COVID vaccine recommended, pt declined, Risks and benefits were discussed with patient.     Cancer Screening:  PAP: 4/2021, NILM c neg EMB, s/p hyst, cervix removed, path benign (had benign fibroids and endometriomas of the ovaries)  Mammogram:  5/2023 benign, will schedule repeat.  Colonoscopy:  rec now, patient opted for fit kit for now.  Will plan for colonoscopy at 52, sooner if needed    Objective:   /80 (BP Location: Right arm, Patient Position: Sitting, BP Method: Medium (Manual))   Ht 5' 1" (1.549 m)   Wt 55.8 kg (123 lb)   LMP 06/13/2021 Comment: DVH / BSO  6-23-21  BMI 23.24 kg/m²        General: AAO x3, no apparent distress  HEENT: no cervical LAD, no thyroid masses appreciated  CV: RRR, no m/r/g  Pulm: Lungs CTAB, no crackles, no wheezes  Abd: s/NT/ND +BS  Extremities: no c/c/e    Labs:     Lab Results   Component Value Date    WBC 9.04 10/13/2023    HGB 14.3 10/13/2023    HCT 43.3 10/13/2023    MCV 94 10/13/2023     10/13/2023     Sodium   Date Value Ref Range Status   10/13/2023 139 136 - 145 mmol/L Final     Potassium   Date Value Ref Range Status   10/13/2023 4.0 3.5 - 5.1 mmol/L Final     Chloride   Date Value Ref Range Status   10/13/2023 105 95 - 110 mmol/L Final     CO2   Date Value Ref Range Status   10/13/2023 22 " (L) 23 - 29 mmol/L Final     Glucose   Date Value Ref Range Status   10/13/2023 82 70 - 110 mg/dL Final     BUN   Date Value Ref Range Status   10/13/2023 10 6 - 20 mg/dL Final     Creatinine   Date Value Ref Range Status   10/13/2023 1.0 0.5 - 1.4 mg/dL Final     Calcium   Date Value Ref Range Status   10/13/2023 9.2 8.7 - 10.5 mg/dL Final     Total Protein   Date Value Ref Range Status   10/13/2023 6.9 6.0 - 8.4 g/dL Final     Albumin   Date Value Ref Range Status   10/13/2023 4.1 3.5 - 5.2 g/dL Final     Total Bilirubin   Date Value Ref Range Status   10/13/2023 0.7 0.1 - 1.0 mg/dL Final     Comment:     For infants and newborns, interpretation of results should be based  on gestational age, weight and in agreement with clinical  observations.    Premature Infant recommended reference ranges:  Up to 24 hours.............<8.0 mg/dL  Up to 48 hours............<12.0 mg/dL  3-5 days..................<15.0 mg/dL  6-29 days.................<15.0 mg/dL       Alkaline Phosphatase   Date Value Ref Range Status   10/13/2023 42 (L) 55 - 135 U/L Final     AST   Date Value Ref Range Status   10/13/2023 24 10 - 40 U/L Final     ALT   Date Value Ref Range Status   10/13/2023 22 10 - 44 U/L Final     Anion Gap   Date Value Ref Range Status   10/13/2023 12 8 - 16 mmol/L Final     eGFR if    Date Value Ref Range Status   10/05/2021 >60 >60 mL/min/1.73 m^2 Final     eGFR if non    Date Value Ref Range Status   10/05/2021 >60 >60 mL/min/1.73 m^2 Final     Comment:     Calculation used to obtain the estimated glomerular filtration  rate (eGFR) is the CKD-EPI equation.        Lab Results   Component Value Date    HGBA1C 5.0 10/13/2023     Lab Results   Component Value Date    LDLCALC 67.6 10/13/2023     Lab Results   Component Value Date    TSH 1.047 10/13/2023         Assessment/Plan     Adrienne was seen today for annual exam.    Diagnoses and all orders for this visit:    Visit for annual health  examination  History and physical exam completed.  Health maintenance reviewed as above.  Vitals today wnl  BB prn for PVCs, currently not req meds.  Will schedule fasting labs for Oct since she already had them last Fall through gyn  -     CBC Auto Differential; Future  -     Comprehensive Metabolic Panel; Future  -     Hemoglobin A1C; Future  -     Lipid Panel; Future    Screening mammogram, encounter for  -     Mammo Digital Screening Bilat w/ [place]; Future      HM as above  RTC in 1 year, sooner if needed.    Christine Venegas MD  Department of Internal Medicine - [facility]  07/29/2024

## 2024-08-02 ENCOUNTER — HOSPITAL ENCOUNTER (OUTPATIENT)
Dept: RADIOLOGY | Facility: HOSPITAL | Age: 49
Discharge: HOME | End: 2024-08-02
Attending: INTERNAL MEDICINE
Payer: COMMERCIAL

## 2024-08-02 DIAGNOSIS — Z12.31 SCREENING MAMMOGRAM, ENCOUNTER FOR: ICD-10-CM

## 2024-08-02 PROCEDURE — 77067 SCR MAMMO BI INCL CAD: CPT | Mod: 26,,, | Performed by: RADIOLOGY

## 2024-08-02 PROCEDURE — 77063 BREAST TOMOSYNTHESIS BI: CPT | Mod: 26,,, | Performed by: RADIOLOGY

## 2024-08-02 PROCEDURE — 77067 SCR MAMMO BI INCL CAD: CPT | Mod: TC

## 2024-08-02 PROCEDURE — 77063 BREAST TOMOSYNTHESIS BI: CPT | Mod: TC

## 2024-08-26 ENCOUNTER — LAB VISIT (OUTPATIENT)
Dept: LAB | Facility: OTHER | Age: 49
End: 2024-08-26
Attending: OBSTETRICS & GYNECOLOGY
Payer: COMMERCIAL

## 2024-08-26 DIAGNOSIS — Z13.21 SCREENING FOR MALNUTRITION: ICD-10-CM

## 2024-08-26 DIAGNOSIS — Z00.00 ROUTINE MEDICAL EXAM: ICD-10-CM

## 2024-08-26 DIAGNOSIS — N95.1 MENOPAUSAL STATE: ICD-10-CM

## 2024-08-26 LAB
25(OH)D3+25(OH)D2 SERPL-MCNC: 76 NG/ML (ref 30–96)
DHEA-S SERPL-MCNC: 60.8 UG/DL (ref 56.2–282.9)
ESTRADIOL SERPL-MCNC: 66 PG/ML
FOLATE SERPL-MCNC: 13.5 NG/ML (ref 4–24)
PROGEST SERPL-MCNC: 8.6 NG/ML
T3FREE SERPL-MCNC: 2.6 PG/ML (ref 2.3–4.2)
T4 FREE SERPL-MCNC: 1.16 NG/DL (ref 0.71–1.51)
TESTOST SERPL-MCNC: 163 NG/DL (ref 5–73)
TSH SERPL DL<=0.005 MIU/L-ACNC: 1.69 UIU/ML (ref 0.4–4)
VIT B12 SERPL-MCNC: 668 PG/ML (ref 210–950)

## 2024-08-26 PROCEDURE — 84481 FREE ASSAY (FT-3): CPT | Performed by: OBSTETRICS & GYNECOLOGY

## 2024-08-26 PROCEDURE — 84402 ASSAY OF FREE TESTOSTERONE: CPT | Performed by: OBSTETRICS & GYNECOLOGY

## 2024-08-26 PROCEDURE — 84439 ASSAY OF FREE THYROXINE: CPT | Performed by: OBSTETRICS & GYNECOLOGY

## 2024-08-26 PROCEDURE — 82746 ASSAY OF FOLIC ACID SERUM: CPT | Performed by: OBSTETRICS & GYNECOLOGY

## 2024-08-26 PROCEDURE — 82607 VITAMIN B-12: CPT | Performed by: OBSTETRICS & GYNECOLOGY

## 2024-08-26 PROCEDURE — 82670 ASSAY OF TOTAL ESTRADIOL: CPT | Performed by: OBSTETRICS & GYNECOLOGY

## 2024-08-26 PROCEDURE — 84443 ASSAY THYROID STIM HORMONE: CPT | Performed by: OBSTETRICS & GYNECOLOGY

## 2024-08-26 PROCEDURE — 82627 DEHYDROEPIANDROSTERONE: CPT | Performed by: OBSTETRICS & GYNECOLOGY

## 2024-08-26 PROCEDURE — 84403 ASSAY OF TOTAL TESTOSTERONE: CPT | Performed by: OBSTETRICS & GYNECOLOGY

## 2024-08-26 PROCEDURE — 82306 VITAMIN D 25 HYDROXY: CPT | Performed by: OBSTETRICS & GYNECOLOGY

## 2024-08-26 PROCEDURE — 84144 ASSAY OF PROGESTERONE: CPT | Performed by: OBSTETRICS & GYNECOLOGY

## 2024-08-29 LAB — TESTOST FREE SERPL-MCNC: 1.8 PG/ML

## 2024-10-07 ENCOUNTER — LAB VISIT (OUTPATIENT)
Dept: LAB | Facility: OTHER | Age: 49
End: 2024-10-07
Attending: INTERNAL MEDICINE
Payer: COMMERCIAL

## 2024-10-07 DIAGNOSIS — Z00.00 VISIT FOR ANNUAL HEALTH EXAMINATION: ICD-10-CM

## 2024-10-07 LAB
ALBUMIN SERPL BCP-MCNC: 3.8 G/DL (ref 3.5–5.2)
ALP SERPL-CCNC: 41 U/L (ref 55–135)
ALT SERPL W/O P-5'-P-CCNC: 23 U/L (ref 10–44)
ANION GAP SERPL CALC-SCNC: 9 MMOL/L (ref 8–16)
AST SERPL-CCNC: 20 U/L (ref 10–40)
BASOPHILS # BLD AUTO: 0.04 K/UL (ref 0–0.2)
BASOPHILS NFR BLD: 0.6 % (ref 0–1.9)
BILIRUB SERPL-MCNC: 0.6 MG/DL (ref 0.1–1)
BUN SERPL-MCNC: 14 MG/DL (ref 6–20)
CALCIUM SERPL-MCNC: 8.9 MG/DL (ref 8.7–10.5)
CHLORIDE SERPL-SCNC: 106 MMOL/L (ref 95–110)
CHOLEST SERPL-MCNC: 150 MG/DL (ref 120–199)
CHOLEST/HDLC SERPL: 2.2 {RATIO} (ref 2–5)
CO2 SERPL-SCNC: 24 MMOL/L (ref 23–29)
CREAT SERPL-MCNC: 1 MG/DL (ref 0.5–1.4)
DIFFERENTIAL METHOD BLD: ABNORMAL
EOSINOPHIL # BLD AUTO: 0.1 K/UL (ref 0–0.5)
EOSINOPHIL NFR BLD: 2.1 % (ref 0–8)
ERYTHROCYTE [DISTWIDTH] IN BLOOD BY AUTOMATED COUNT: 12.9 % (ref 11.5–14.5)
EST. GFR  (NO RACE VARIABLE): >60 ML/MIN/1.73 M^2
ESTIMATED AVG GLUCOSE: 97 MG/DL (ref 68–131)
GLUCOSE SERPL-MCNC: 93 MG/DL (ref 70–110)
HBA1C MFR BLD: 5 % (ref 4–5.6)
HCT VFR BLD AUTO: 43.6 % (ref 37–48.5)
HDLC SERPL-MCNC: 68 MG/DL (ref 40–75)
HDLC SERPL: 45.3 % (ref 20–50)
HGB BLD-MCNC: 14.7 G/DL (ref 12–16)
IMM GRANULOCYTES # BLD AUTO: 0.01 K/UL (ref 0–0.04)
IMM GRANULOCYTES NFR BLD AUTO: 0.2 % (ref 0–0.5)
LDLC SERPL CALC-MCNC: 65 MG/DL (ref 63–159)
LYMPHOCYTES # BLD AUTO: 2.1 K/UL (ref 1–4.8)
LYMPHOCYTES NFR BLD: 31.5 % (ref 18–48)
MCH RBC QN AUTO: 31.1 PG (ref 27–31)
MCHC RBC AUTO-ENTMCNC: 33.7 G/DL (ref 32–36)
MCV RBC AUTO: 92 FL (ref 82–98)
MONOCYTES # BLD AUTO: 0.5 K/UL (ref 0.3–1)
MONOCYTES NFR BLD: 7 % (ref 4–15)
NEUTROPHILS # BLD AUTO: 3.8 K/UL (ref 1.8–7.7)
NEUTROPHILS NFR BLD: 58.6 % (ref 38–73)
NONHDLC SERPL-MCNC: 82 MG/DL
NRBC BLD-RTO: 0 /100 WBC
PLATELET # BLD AUTO: 289 K/UL (ref 150–450)
PMV BLD AUTO: 11.1 FL (ref 9.2–12.9)
POTASSIUM SERPL-SCNC: 3.7 MMOL/L (ref 3.5–5.1)
PROT SERPL-MCNC: 6.6 G/DL (ref 6–8.4)
RBC # BLD AUTO: 4.73 M/UL (ref 4–5.4)
SODIUM SERPL-SCNC: 139 MMOL/L (ref 136–145)
TRIGL SERPL-MCNC: 85 MG/DL (ref 30–150)
WBC # BLD AUTO: 6.55 K/UL (ref 3.9–12.7)

## 2024-10-07 PROCEDURE — 80061 LIPID PANEL: CPT | Performed by: INTERNAL MEDICINE

## 2024-10-07 PROCEDURE — 80053 COMPREHEN METABOLIC PANEL: CPT | Performed by: INTERNAL MEDICINE

## 2024-10-07 PROCEDURE — 83036 HEMOGLOBIN GLYCOSYLATED A1C: CPT | Performed by: INTERNAL MEDICINE

## 2024-10-07 PROCEDURE — 36415 COLL VENOUS BLD VENIPUNCTURE: CPT | Performed by: INTERNAL MEDICINE

## 2024-10-07 PROCEDURE — 85025 COMPLETE CBC W/AUTO DIFF WBC: CPT | Performed by: INTERNAL MEDICINE

## 2024-12-22 NOTE — PATIENT INSTRUCTIONS
A Pinguecula is a yellowish patch or bump on the white of the eye, most often on the side closest to the nose. It is not a tumor, but an alteration of normal tissue resulting in a deposit of protein and fat. Unlike a pterygium, a pinguecula does not actually grow onto the cornea. A pinguecula may also be a response to chronic eye irritation or sunlight.   No treatment is necessary unless it becomes inflamed. A pinguecula does not grow onto the cornea or threaten sight. If particularly annoying, a pinguecula may on rare occasions be surgically removed, but he postoperative scar may be as cosmetically objectionable as the pinguecula.        USE: Systane Overnight Gel at bedtime and Systane or Refresh Plus artificial tears throughout the day as needed    For red eyes: Use LUMIFY drops once daily as needed      Protecting Your Eyes from Solar Radiation  The sun supports all life on our planet, but its life-giving rays also pose dangers.  The suns primary danger is in the form of Ultraviolet (UV) radiation. UV radiation is a component of solar radiation, but it can also be given off by artificial sources like welding machines, tanning beds and lasers.  Most are aware of the harm UV radiation can do to the skin, but many may not realize that exposure to UV radiation can harm the eyes or that other components of solar radiation can also affect vision.  There are three types of UV radiation: UV-C is absorbed by the ozone layer and does not present any threat; UV-A and UV-B radiation can have adverse long- and short-term effects on the eyes and vision.  If your eyes are exposed to excessive amounts of UV radiation over a short period of time, you are likely to experience an effect called photokeratitis.    Like a sunburn of the eye, photokeratitis may be painful and include symptoms such as red eyes, a foreign body sensation or gritty feeling in the eyes, extreme sensitivity to light and excessive tearing. Fortunately,  this is usually temporary and rarely causes permanent damage to the eyes.  Long-term exposure to UV radiation, however, can be more serious. Scientific studies and research have shown that exposure to small amounts of UV radiation over a period of many years increases the chance of developing a cataract and may cause damage to the retina, a nerve-rich lining of the eye that is used for seeing. Additionally, chronic exposure to shorter wavelength visible light (i.e. blue and violet light) may also be harmful to the retina.  The longer the eyes are exposed to solar radiation, the greater the risk of developing later in life such conditions as cataracts or macular degeneration. Since it is not clear how much exposure to solar radiation will cause damage, the AOA recommends wearing quality sunglasses that offer UV protection and wearing a hat or cap with a wide brim whenever you spend time outdoors.    To provide adequate protection for your eyes, sunglasses should:  block out 99 to 100 percent of both UV-A and UV-B radiation;   screen out 75 to 90 percent of visible light;   be perfectly matched in color and free of distortion and imperfection; and   have lenses that are gray for proper color recognition.  The lenses in sunglasses should be made from polycarbonate or Trivex® material if you participate in potentially eye-hazardous work or sports. These lenses provide the most impact resistance.  If you spend a lot of time outdoors in bright sunlight, wrap around frames can provide additional protection from the harmful solar radiation.  Dont forget protection for children and teenagers. They typically spend more time in the sun than adults.        Open Your Eyes to Healthy Eating Habits  NUTRITION TIPS FOR YOUR EYE SIGHT  Doctors of optometry see millions of patients a year and are the primary providers of eye and vision care in [place]. This month, in celebration of national Save Your Vision Month, the American  Optometric Association (AOA), [employer] and [employer] are educating Americans on the many preventative actions they can take to protect their sight, including eating  right.    More than 43 million Americans suffer from cataracts or age-related macular degeneration (AMD), the two leading causes of vision loss and blindness.  Research indicates that there is a strong correlation between good nutrition and the prevention of these age-related eye diseases. Eating foods rich in key nutrients - antioxidants lutein and zeaxanthin, essential fatty acids, vitamins C and E and the mineral zinc - can help protect eyesight and vision.    Fast Facts   In a recent survey conducted by the AOA, nearly three-fourths (72%) of respondents  age 55 and older began noticing changes in their vision between the ages of 40  and 45.   To cope with vision loss or various eye problems, less than one-third (29%) of  respondents are increasing their nutrient intake for healthy eyes.   Many Americans (48%) still believe that carrots are the best food for eye health,  when, in fact, spinach and other dark leafy greens are the healthiest foods for  the eyes because they naturally contain large amounts of lutein and zeaxanthin.   In order to maintain healthy eyes, studies show that 10 mg of lutein should be  consumed each day or one cup of cooked spinach four times a week.   More than 50% of Americans do not take in the recommended dosage of vitamin C  per day.   One cup (8 fl oz) of orange juice per day contains 81.6 mg/serving of vitamin C,  more than enough to help offset some eye diseases.  Visit www.AOA.org for additional information, or for vision-friendly recipes      Nutrition for Healthy Eyes  By Ricki Gee OD  Research suggests that antioxidants and other important nutrients may reduce your risk of cataracts and macular degeneration. Specific antioxidants can have additional benefits as well; for example, vitamin A  protects against blindness, and vitamin C may play a role in preventing or alleviating glaucoma.  Omega-3 essential fatty acids appear to help the eye in a variety of ways, from alleviating symptoms of dry eye syndrome to guarding against macular damage.  Eye Benefits of Vitamins and Micronutrients    A healthy diet for your eyes should include plenty of colorful fruits and vegetables.   The following vitamins, minerals and other nutrients have been shown to be essential for good vision and may protect your eyes from sight-robbing conditions and diseases.  Incorporating the following foods in your diet will help you get the Recommended Dietary Allowance (RDA) of these important eye nutrients. Established by the [place] of Medicine (National Academy of Sciences), the RDA is the average daily dietary intake level of a nutrient sufficient to meet the requirements of nearly all healthy individuals in a specific life stage and gender group.  While the RDA is a useful reference, some eye care practitioners recommend higher daily intakes of certain nutrients for people at risk for eye problems.  (In the following list, mg = milligram; mcg = microgram (1/1000 of a mg) and IU = International Unit.)  Beta-carotene  Eye benefits of beta-carotene: When taken in combination with zinc and vitamins C and E, beta-carotene may reduce the progression of macular degeneration.   Food sources: Carrots, sweet potatoes, spinach, kale, butternut squash.   RDA: None (most supplements contain 5,000 to 25,000 IU).  Bioflavonoids (Flavonoids)  Eye benefits of bioflavonoids: May protect against cataracts and macular degeneration.   Food sources: Tea, red wine, citrus fruits, bilberries, blueberries, cherries, legumes, soy products.   RDA: None.  More Info   Learn more about omega-3s and save $2 on TheraTears Nutrition gels   Moderate to severe dry eyes? Find out if Retaine MGD is right for you   Learn how Optometry Giving Sight helps 670  million people to see again  Lutein and Zeaxanthin  Eye benefits of lutein and zeaxanthin: May prevent cataracts and macular degeneration.   Food sources: Spinach, kale, turnip greens, aarti greens, squash.   RDA: None.      This infographic shows which nutrients you need for good eye health as you age. Please click here for the full image. (Image: [employer])   Omega-3 Fatty Acids  Eye benefits of omega-3 fatty acids: May help prevent macular degeneration (AMD) and dry eyes.   Food sources: Cold-water fish such as salmon, mackerel and herring; fish oil supplements, freshly ground flaxseeds, walnuts.   RDA: None; but for cardiovascular benefits, the American Heart Association recommends approximately 1,000 mg daily.  Selenium  Eye benefits of selenium: When combined with carotenoids and vitamins C and E, may reduce risk of advanced AMD.   Food sources: Seafood (shrimp, crab, salmon, halibut), [place] nuts, enriched noodles, brown rice.   RDA: 55 mcg for teens and adults (60 mcg for women during pregnancy and 70 mcg when breast-feeding).  Vitamin A  Eye benefits of vitamin A: May protect against night blindness and dry eyes.   Food sources: Beef or chicken liver; eggs, butter, milk.   RDA: 3,000 IU for men; 2,333 IU for women (2,567 IU during pregnancy and 4,333 IU when breast-feeding).  Vitamin C  Eye benefits of vitamin C: May reduce the risk of cataracts and macular degeneration.   Food sources: Sweet peppers (red or green), kale, strawberries, broccoli, oranges, cantaloupe.   RDA: 90 mg for men; 70 mg for women (85 mg during pregnancy and 120 mg when breast-feeding).  Vitamin D  Eye benefits of vitamin D: May reduce the risk of macular degeneration.   Food sources: [place], sardines, mackerel, milk; orange juice fortified with vitamin D.   RDA: None, but the American Academy of Pediatrics recommends 400 IU per day for infants, children and adolescents, and many experts recommend higher daily intakes for  "adults.   The best source of vitamin D is exposure to sunlight. Ultraviolet radiation from the sun stimulates production of vitamin D in human skin, and just a few minutes of exposure to sunlight each day (without sunscreen) will insure your body is producing adequate amounts of vitamin D.  Vitamin E  Eye benefits of vitamin E: When combined with carotenoids and vitamin C, may reduce the risk of advanced AMD.   Food sources: Almonds, sunflower seeds, hazelnuts.   RDA: 15 mg for teens and adults (15 mg for women during pregnancy and 19 mg when breast-feeding).  Zinc  Eye benefits of zinc: Helps vitamin A reduce the risk of night blindness; may play a role in reducing risk of advanced AMD.   Food sources: Oysters, beef, [place] crab, turkey (dark meat).   RDA: 11 mg for men; 8 mg for women (11 mg during pregnancy and 12 mg when breast-feeding).  In general, it's best to obtain most nutrients through a healthy diet, including at least two servings of fish per week and plenty of colorful fruits and vegetables.  If you plan to begin a regimen of eye vitamins, be sure to discuss this with your optometrist or ophthalmologist. Taking too much of certain vision supplements can cause problems, especially if you are taking prescription medications for health problems.  Bon appétit!   Home » Nutrition » Overview     About the Author: Ricki Gee OD, is  of [employer]. 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 His special interests include contact lenses, nutrition and preventive vision care.      Eye Benefits of Omega-3 Fatty Acids  By Ricki Gee OD  You may find it hard to believe that fat is essential to your health, but it's true. Without fat, our bodies can't function properly. And without the proper kinds of fats in our diet, our eye health also may suffer.  Fatty acids are the "building blocks" of fat. These " important nutrients are critical for the normal production and functioning of cells, muscles, nerves and organs. Fatty acids also are required for the production of hormone-like compounds that help regulate blood pressure, heart rate and blood clotting.  Some fatty acids -- called essential fatty acids (EFAs) -- are necessary to our diet, because our body can't produce them. To stay healthy, we must obtain these fatty acids from our food.  Two types of EFAs are omega-3 fatty acids and omega-6 fatty acids. Studies have found that omega-3 fatty acids, in particular, may benefit eye health.  Omega-3 fatty acids include docosahexaenoic acid (DHA), eicoapentaenoic acid (EPA) and alpha-linolenic acid (ALA).  Omega-3 Fatty Acids and Infant Vision Development  A number of clinical studies have shown omega-3 fatty acids are essential for normal infant vision development.    Grilled salmon is an excellent natural source of omega-3 fatty acids.   DHA and other omega-3 fatty acids are found in maternal breast milk and also are added to some supplemented infant formulas. Omega-3 supplemental formulas appear to stimulate vision development in infants.  According to an analysis of several studies conducted by researchers at [employer] and published in the journal Pediatrics, the authors found that healthy pre-term infants who were fed DHA-supplemented formula showed significantly better visual acuity at 2 and 4 months of age, compared with similar pre-term infants who were fed formula that did not contain the omega-3 supplement.  Adequate amounts of DHA and other omega-3 fatty acids in the diet of pregnant women also appear to be important in normal infant vision development.  More Info   Learn more about omega-3s and save $2 on TheraTears Nutrition gels   Moderate to severe dry eyes? Find out if Retaine MGD is right for you   Learn how Optometry Giving Sight helps 670 million people to see again  In a study  "published in the American Journal of Clinical Nutrition, [place] researchers found that infant girls whose mothers received DHA supplements from their fourth month of pregnancy until delivery were less likely to have below-average visual acuity at 2 months of age than infant girls whose mothers did not receive the omega-3 supplements.  Adult Eye Benefits of Omega-3 Fatty Acids  Several studies suggest omega-3 fatty acids may help protect adult eyes from macular degeneration and dry eye syndrome. Essential fatty acids also may help proper drainage of intraocular fluid from the eye, decreasing the risk of high eye pressure and glaucoma.  In a large  study published in 2008, participants who ate oily fish (an excellent source of DHA and EPA omega-3 fatty acids) at least once per week had half the risk of developing neovascular ("wet") macular degeneration, compared with those who ate fish less than once per week.    Eye Nutrition News   Omega-3 Supplements Relieve Dry Eye Symptoms Among Computer Users, Study Finds  February 2015 -- Taking daily omega-3 fatty acid supplements could help relieve your dry eyes associated with computer use, according to a study.    The study participants were 456 computer users in [facility] who complained of dry eyes and who used a computer for more than three hours a day for at least one year.  Subjects in one group (220) were given two capsules of omega-3 fatty acids, each containing 180mg EPA and 120mg DHA, to supplement their daily diet; subjects in the other group (236) were given two capsules of a placebo containing olive oil for daily use. Each group took the daily supplements for three months.  At the end of the three-month trial, a survey of the participants revealed dry eye symptoms diminished after dietary intervention with omega-3 fatty acids, and use of the omega-3 supplements also reduced abnormal tear evaporation. The omega-3 supplements also increased the density of " conjunctival goblet cells on the surface of the eye. These cells secrete substances that lubricate the eye during blinks, stabilize the tear film and reduce dryness.  The study authors concluded that orally administered omega-3 fatty acid supplements can alleviate dry eye symptoms, slow tear evaporation, and improve signs of a healthy eye surface in patients suffering from dry eyes related to computer vision syndrome.  A report of this study was published online this month by the journal Contact Lens & Anterior Eye. -- G.H.  Also, a 2009 National Eye [place] (NEI) study that used data obtained from the Age-Related Eye Disease Study (AREDS) found participants who reported the highest level of omega-3 fatty acids in their diet were 30 percent less likely than their peers to develop macular degeneration during a 12-year period.  In May 2013, the NEI published results of a large follow-up to the original AREDS study called AREDS2. Among other things, AREDS2 investigated whether daily supplementation of omega-3 fatty acids, along with the original AREDS nutritional supplement or modifications of that formula -- which contained beta-carotene, vitamin C, vitamin E, zinc and copper -- would further reduce the risk of AMD progression among study participants with early signs of macular degeneration. (The original AREDS supplement reduced the risk of AMD progression by 25 percent among a similar population.)  A somewhat surprising result of AREDS2 was that participants who supplemented their diet with 1,000 mg of omega-3s daily (350 mg DHA and 650 mg EPA) did not show any reduction of their risk for progressive AMD over the 5-year duration of the study, compared with participants who did not receive omega-3 supplements.  Possible explanations for these different findings from AREDS and AREDS2 data may be that omega-3 fatty acids are more effective at reducing the risk of age-related eye diseases when obtained via food  "sources rather than from nutritional supplements, and that a healthy diet containing plenty of omega-3s along with other important nutrients consumed over a person's lifetime is more protective than taking nutritional supplements for a 5-year period.  Omega-3 fatty acids also have been found to reduce the risk of dry eyes. In a study of more than 32,000 women between the ages of 45 and 84, those with the highest ratio of (potentially harmful) omega-6 fatty acids to beneficial omega-3 fatty acids in their diet (15-to-1) had a significantly greater risk of dry eye syndrome, compared with the women with the lowest ratio (less than 4-to-1). The study also found that the women who ate at least two servings of tuna per week had significantly less risk of dry eye than women who ate one or fewer servings per week.  Omega-3 fatty acids also may help treat dry eyes. In a recent study of dry eyes induced in mice, topical application of the omega-3 fatty acid ALA led to a significant decrease in dry eye signs and inflammation associated with dry eye.  [place]-3 Foods  While both omega-3 and omega-6 fatty acids are important to health, the balance of these two types of EFAs in our diet is extremely important. Most experts believe the ratio of omega-6 to omega-3 fatty acids in a healthy diet should be 4-to-1 or lower.  Many eye doctors recommend a diet high in omega-3 fatty acids to reduce the risk of eye problems.   Unfortunately, the typical American diet, characterized by significant amounts of meat and processed foods, tends to contain 10 to 30 times more omega-6 than omega-3 fatty acids. This imbalance of omega-6 ("bad") fatty acids to omega-3 ("good") fatty acids appears to be a contributing cause of a number of serious health problems, including heart disease, cancer, asthma, arthritis and depression.  One of the best steps you can take to improve your diet is to eat more foods that are rich in omega-3 fatty acids and fewer " "that are high in omega-6 fatty acids.  The best food sources of beneficial omega-3 fatty acids are cold-water fish, which are high in both DHA and EPA. Examples include sardines, herring, salmon and tuna. Wild-caught varieties usually are better than "farmed" fish, which typically are subject to higher levels of pollutants and chemicals.  The American Heart Association recommends a minimum of two servings of cold-water fish weekly to reduce the risk of cardiovascular disease, and many eye doctors likewise recommend a diet high in omega-3 fatty acids to reduce the risk of eye problems.  If you aren't a fish lover, another way to make sure your diet contains enough omega-3s it to take fish oil supplements. These are available in capsule and liquid form, and many varieties feature a "non-fishy" taste.  Other good sources of omega-3 fatty acids include flaxseeds, flaxseed oil, walnuts and dark green leafy vegetables. However, your body cannot process the ALA omega-3 fatty acids from these vegetarian sources as easily as the DHA and EPA omega-3 fatty acids found in fish.  To reduce your intake of omega-6s, avoid fried and highly processed foods. Many cooking oils, including sunflower oil and corn oil, are very high in omega-6 fatty acids. High cooking temperatures also create harmful trans-fatty acids, or "trans-fats."  Trans fats interfere with the body's absorption of beneficial omega-3 fatty acids and may contribute to a number of serious diseases, including cancer, heart disease, atherosclerosis (hardening of the arteries), high blood pressure, diabetes, obesity, arthritis and immune system disorders.  Currently, there is no Recommended Dietary Allowance (RDA) for omega-3 fatty acids. But, according to the American Heart Association, research suggests daily intakes of DHA and EPA (combined) ranging from 500 milligrams (0.5 gram) to 1.8 grams (either from fish or fish oil supplements) significantly reduces cardiac " risks. For ALA, daily intakes of 1.5 to 3 grams (g) seem to be beneficial.  Foods Containing Omega-3 Essential Fatty Acids   Food DHA and EPA Omega-3s (total), grams   [place], [place] (half fillet, grilled) 3.89   Mackerel, Pacific (1 fillet, grilled) 3.25   Sardine oil (1 tablespoon) 2.83   [place], [place] (half fillet, grilled) 2.68   Cod liver oil (1 tablespoon) 2.43   [place], pink (half fillet, grilled)  1.60   Herring oil (1 tablespoon) 1.43   Sardines, canned in oil (approx. 3 ounces) 0.90   White tuna, canned in water (approx. 3 ounces) 0.73   Source: [employer], U.S. Dept. of Agriculture   For a more nutritious diet and potentially better eye health, try these simple changes:  1. Replace cooking oils that are high in omega-6 fatty acids with olive oil, which has significantly lower levels of omega-6 fatty acids.  2. Eat plenty of fish, fruits and vegetables.  3. Avoid hydrogenated oils (found in many snack foods) and margarine.  4. Avoid fried foods and foods containing trans fats.  5. Limit your consumption of red meat.  Choosing a healthy diet that includes a variety of foods with plenty of omega-3 fatty acids and limiting your intake of potentially harmful omega-6 fatty acids will significantly increase your odds of a lifetime of good vision and vibrant health.   Home » Nutrition » Omega-3 Fatty Acids     About the Author: Ricki Gee OD, is  of [employer]. 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 His special interests include contact lenses, nutrition and preventive vision care    Adult Vision:   41 to 60 Years of Age    If you are over 40 years of age, you've probably noticed changes in your vision. Difficulty seeing clearly for reading and close work is among the most common problems adults develop between ages 41 to 60. However, this is also the time when other changes in  your eyes can start to affect your work and enjoyment of life.   Beginning in the early to mid-forties, most adults may start to experience problems with their ability to see clearly at close distances, especially for reading and computer tasks. This normal aging change in the eye's focusing ability, called presbyopia, will continue to progress over time.      Many people in middle age begin to experience difficulty with their vision.   Initially, you may find you need to hold reading materials farther away to see them clearly. Print in the newspaper or on a restaurant menu may appear blurred, especially under dim lighting. If you already wear prescription glasses or contact lenses to see clearly in the distance, the near vision changes caused by presbyopia can bring about the need to use bifocal or multifocal lenses. If you are nearsighted, you may have discovered that you now need to remove you glasses to see better up close. Fortunately, people with presbyopia now have many options to improve their ability to see well.   Along with the onset of presbyopia, an increase in the incidence of eye health problems occurs during these years. Whether or not there is a need for eyeglasses, adults should be examined for signs of developing eye and vision problems. A comprehensive eye examination is recommended at least every two years. Don't rely on an insufficient substitute like the limited 's license vision test or other vision screenings to determine if you have an eye or vision problem.  Adults over 40 may be particularly at risk for the development of eye and vision problems if any of the following exist:  Chronic, systemic conditions such as diabetes or high blood pressure.   A family history of glaucoma or macular degeneration.   A highly visually demanding job or work in an eye-hazardous occupation.   Health conditions like high cholesterol, thyroid conditions, anxiety or depression, and arthritis for which  you take medications. Many medications, even antihistamines, have ocular side-effects.    Understanding Age-related Vision Changes  Just like your body, your eyes and vision change over time. Aging changes in various parts of the eye can result in a number of noticeable differences in how well you see. While not everyone will experience the same level of symptoms, the following are common age-related vision changes:  Need for More Light  As you age, you need more light to see as well as you did in years past. Brighter lights in your work area or next to your reading chair will help make reading and other near tasks easier.    Difficulty Reading and Doing Close Work  Printed materials are not as clear as before, in part because the lens in your eye becomes less flexible with time. This makes it harder for your eyes to focus near objects with the same ability you had when you were younger.    Problems with Glare  You may notice additional glare from headlights at night or sun reflecting off of windshields or pavement during the day, making it more difficult to drive. Changes within the lens in your eye cause light entering the eye to be scattered rather than focused precisely on the retina, thus creating more glare.    Changes in Color Perception  The normally clear lens located inside your eye may start to discolor making it harder to see and distinguish between certain shades of colors.    Reduced Tear Production  With age, the tear glands in your eyes will produce fewer tears. This is particularly true for women after menopause. As a result, your eyes may feel dry and irritated. Having an adequate amount of tears is an essential element in keeping your eyes healthy and maintaining clear sight.      Encountering Problems with Near Vision after 40  If you have enjoyed relatively good vision throughout your life and haven't needed eyeglasses or contact lenses to correct distance vision, then the development of near  vision problems after age 40 can be somewhat of a concern and a frustration. Losing the ability to read the newspaper or see the cell phone numbers may seem to have occurred abruptly. Actually, these changes have been occurring gradually since childhood. But up until now, your eyes have had adequate focusing power to allow you to see clearly for reading and close work. Now your eyes no longer have enough focusing power for clear and comfortable near vision tasks.      Persons with presbyopia have several options available to regain clear near vision.   This loss of focusing ability for near vision, called presbyopia, is simply the result of the lens inside the eye becoming less flexible. This flexibility allows the eye to change focus from objects are far to objects that are close. Persons with presbyopia have several options available to regain clear near vision. They include:  Eyeglasses, including single vision reading glasses and multifocal lenses   Contact lenses, including monovision and bifocal lenses   Laser surgery and other refractive surgery procedures  As you continue to age through your 50s and beyond, presbyopia becomes more advanced. You may notice the need for more frequent changes in eyeglass or contact lens prescriptions. Around age 60, these changes in near vision should stop and prescription changes should occur less frequently.  Presbyopia can't be prevented or cured, but many options are available to help compensate for the loss of near focusing ability. Most individuals should be able to obtain clear, comfortable near vision for all of their lifestyle needs.       Warning Signs of Eye Health Problems  This is also the time in life when your risk for developing a number of eye and vision problems increases. If you experience any of the following symptoms, you may have the early warning signs of a serious eye health problem:  Fluctuating Vision  If you experience frequent changes in how clearly  you can see, it may be a sign of diabetes or hypertension (high blood pressure). These chronic conditions can damage the tiny blood vessels in the retina, the light sensitive layer at the back of the eye, causing vision loss that can sometimes be permanent.    Seeing Floaters and Flashes   Occasionally, you may see spots or floaters in your eyes. In most cases, these are actually shadowy images of particles floating in the fluid that fills the inside of the eye. Although they can be bothersome, spots and floaters are usually harmless and typically do not risk vision. They are a natural part of the eye's aging process. But if you suddenly see more floaters than normal, and they are accompanied by bright, flashing lights, they may be a warning sign of impending retinal detachment--a tear of the retina. This should be treated immediately to prevent serious loss of vision. (Link to Spots and Floaters)    Loss of Side Vision  If it seems that you are losing peripheral or side vision, this may be a sign of glaucoma. Glaucoma occurs when the optic nerve is damaged and no longer transmits all visual images to the brain. It often has no symptoms until damage to sections of your vision has begun. (Link to glaucoma)    Seeing distorted images   If straight lines appear distorted or wavy or there appears to be a blind spot or empty area in the center of your vision, you may have the signs of age-related macular degeneration (AMD). The disease affects the macula, the part of your retina that is responsible for central vision where the eye's acuity is sharpest. The disease causes a blind spot that's right in the middle of your field of vision. (Link to age-related macular degeneration)   Regular eye examinations and early diagnosis and treatment of eye diseases can help you continue to preserve good vision throughout life.      Courtesy of [employer]         PAST SURGICAL HISTORY:  No significant past surgical history

## 2025-02-20 DIAGNOSIS — Z13.820 SCREENING FOR OSTEOPOROSIS: Primary | ICD-10-CM

## 2025-02-20 DIAGNOSIS — N95.1 MENOPAUSAL STATE: ICD-10-CM

## 2025-03-17 ENCOUNTER — HOSPITAL ENCOUNTER (OUTPATIENT)
Dept: RADIOLOGY | Facility: OTHER | Age: 50
Discharge: HOME | End: 2025-03-17
Attending: OBSTETRICS & GYNECOLOGY
Payer: COMMERCIAL

## 2025-03-17 DIAGNOSIS — Z13.820 SCREENING FOR OSTEOPOROSIS: ICD-10-CM

## 2025-03-17 DIAGNOSIS — N95.1 MENOPAUSAL STATE: ICD-10-CM

## 2025-03-17 PROCEDURE — 77080 DXA BONE DENSITY AXIAL: CPT | Mod: TC

## 2025-03-17 PROCEDURE — 77080 DXA BONE DENSITY AXIAL: CPT | Mod: 26,,, | Performed by: RADIOLOGY

## 2025-05-09 ENCOUNTER — PATIENT MESSAGE (OUTPATIENT)
Dept: SPORTS MEDICINE | Facility: CLINIC | Age: 50
End: 2025-05-09
Payer: COMMERCIAL

## 2025-05-09 ENCOUNTER — TELEPHONE (OUTPATIENT)
Dept: SPORTS MEDICINE | Facility: CLINIC | Age: 50
End: 2025-05-09
Payer: COMMERCIAL

## 2025-05-09 NOTE — PROGRESS NOTES
CC: Left elbow pain    Adrienne Andrew, presents today for follow up evaluation of her left shoulder.  At last appointment, 4/8/2024, patient underwent common extensor tendon CSI. Patient reports essentially complete relief that was ongoing for several months.  Pain is now recurrent for the last several weeks.  Point localized to the lateral epicondyle and lateral elbow.  Additional conservative treatment has included self-directed therapy with a flex bar and oral medication.  She would like to discuss additional treatment options.  At her current elbow pain is present with day-to-day activity.  Worse with repetitive gripping and wrist/forearm range of motion.  No numbness or tingling.  No neck complaints.  No medially based symptoms.    Prior Hx 4/8/2024:   48 y.o. RIGHT hand dominant Female presents as a new patient to me. She  works as an artist. Complaint is left elbow pain x 4 months. Atraumatic onset. She states that around that time 4 mos ago she was starting to increase he frequency of working out again. She is an active person, and in the past has done Cross Fit. She states the pain has been gradual in onset and denies any acute injury/trauma. She denies any previous elbow injury or pain in the past.  Pain localizes laterally over the lateral humeral epicondyle and also over the volar mobile wad. Worse with repetitive gripping activities, resisted forearm supination and certain positions when lifting. Better with rest. Denies neck pain or radicular symptoms. Treatment thus far has included activity modifications, rest, and oral medication. She also has attended formal PT with treatment specifically for possible nerve involvement by her account but the pain has persisted.  Here today to discuss diagnosis and treatment options.     No numbness or tingling reported.  Chief complaint of pain.  No instability complaints.  No mechanical symptoms.    I have operated on her , Lizbeth Andrew,  MD    PMHx notable for anemia.   Negative for tobacco.   Negative for diabetes. Last A1C: 5.0 10/13/23    PAST MEDICAL HISTORY:   Past Medical History:   Diagnosis Date    Anemia     History of robot-assisted laparoscopic hysterectomy 06/23/2021    Hormone replacement therapy (HRT)     PONV (postoperative nausea and vomiting)      PAST SURGICAL HISTORY:  Past Surgical History:   Procedure Laterality Date    ARTHROSCOPIC DEBRIDEMENT OF SHOULDER Right 1/16/2020    Procedure: PARTIAL THICKNESS CUFF DEBRIDEMENT, SHOULDER, ARTHROSCOPIC;  Surgeon: Charmaine Barr MD;  Location: [facility] OR;  Service: Orthopedics;  Laterality: Right;    ARTHROSCOPY OF SHOULDER WITH DECOMPRESSION OF SUBACROMIAL SPACE Right 1/16/2020    Procedure: ARTHROSCOPY, SHOULDER, WITH SUBACROMIAL SPACE DECOMPRESSION EXTENSIVE DEBRIDMENT;  Surgeon: Charmaine Barr MD;  Location: [facility] OR;  Service: Orthopedics;  Laterality: Right;    CYSTOSCOPY N/A 6/23/2021    Procedure: CYSTOSCOPY;  Surgeon: Diana Rock MD;  Location: [facility] OR;  Service: OB/GYN;  Laterality: N/A;    DILATION AND CURETTAGE OF UTERUS      AB    FIXATION OF TENDON Right 1/16/2020    Procedure: FIXATION, TENDON, subpectoral bicep tenodesis;  Surgeon: Charmaine Barr MD;  Location: [facility] OR;  Service: Orthopedics;  Laterality: Right;  FIXATION, TENDON, bicep tenodesis    HYSTERECTOMY  06/23/2021    OOPHORECTOMY  06/23/2021    ROBOT-ASSISTED LAPAROSCOPIC ABDOMINAL HYSTERECTOMY USING DA ANNMARIE XI Bilateral 6/23/2021    Procedure: XI ROBOTIC HYSTERECTOMY;  Surgeon: Diana Rock MD;  Location: [facility] OR;  Service: OB/GYN;  Laterality: Bilateral;    ROBOT-ASSISTED LAPAROSCOPIC LYSIS OF ADHESIONS USING DA ANNMARIE XI  6/23/2021    Procedure: XI ROBOTIC LYSIS, ADHESIONS;  Surgeon: Diana Rock MD;  Location: [facility] OR;  Service: OB/GYN;;    ROBOT-ASSISTED LAPAROSCOPIC SALPINGO-OOPHORECTOMY USING DA ANNMARIE XI Bilateral 6/23/2021    Procedure: XI ROBOTIC SALPINGO-OOPHORECTOMY;  Surgeon: Diana Rock,  MD;  Location: [facility] OR;  Service: OB/GYN;  Laterality: Bilateral;    VAGINAL DELIVERY  2004, 2006, 2009    Boy/Boy/Girl     WISDOM TOOTH EXTRACTION       FAMILY HISTORY:  Family History   Problem Relation Name Age of Onset    Hyperthyroidism Mother      No Known Problems Brother      No Known Problems Brother      No Known Problems Brother      Amblyopia Neg Hx      Blindness Neg Hx      Cancer Neg Hx      Diabetes Neg Hx      Cataracts Neg Hx      Glaucoma Neg Hx      Hypertension Neg Hx      Macular degeneration Neg Hx      Retinal detachment Neg Hx      Strabismus Neg Hx      Stroke Neg Hx      Thyroid disease Neg Hx      Celiac disease Neg Hx      Colon cancer Neg Hx      Crohn's disease Neg Hx      Breast cancer Neg Hx      Ovarian cancer Neg Hx       MEDICATIONS:    Current Outpatient Medications:     azelastine (ASTELIN) 137 mcg (0.1 %) nasal spray, 1 spray (137 mcg total) by Nasal route 2 (two) times daily., Disp: 30 mL, Rfl: 6    celecoxib (CELEBREX) 200 MG capsule, Take 1 capsule (200 mg total) by mouth once daily., Disp: 30 capsule, Rfl: 0    COMPOUND HORMONE REPLACEMENT, Take by mouth once daily. PROGESTERONE  (MED QUEST), Disp: , Rfl:     estradioL (ESTRACE) 2 MG tablet, Take 1 tablet (2 mg total) by mouth every morning., Disp: 90 tablet, Rfl: 3    ketoconazole (NIZORAL) 2 % cream, Apply topically once daily., Disp: 30 g, Rfl: 0    metoprolol tartrate (LOPRESSOR) 25 MG tablet, Take ½ tablet (12.5 mg total) by mouth 2 (two) times daily., Disp: 90 tablet, Rfl: 3    pimecrolimus (ELIDEL) 1 % cream, Apply to rash on face daily as needed, Disp: 60 g, Rfl: 3    prasterone, dhea, (INTRAROSA) 6.5 mg Inst, Insert 1 suppository vaginally every night for 30 days and then twice weekly, Disp: 28 each, Rfl: 6    prasterone, dhea, (INTRAROSA) 6.5 mg Inst, Insert 1 suppository vaginally twice weekly, Disp: 28 each, Rfl: 3    spironolactone (ALDACTONE) 50 MG tablet, take 1 tablet by mouth twice daily, Disp: 180 tablet,  Rfl: 2    spironolactone (ALDACTONE) 50 MG tablet, Take 1 tablet (50 mg total) by mouth 2 (two) times daily., Disp: 180 tablet, Rfl: 1    spironolactone (ALDACTONE) 50 MG tablet, Take 1 tablet by mouth daily, Disp: 180 tablet, Rfl: 1    sulconazole (EXELDERM) 1 % cream, Apply topically once daily., Disp: 60 g, Rfl: 3    valACYclovir (VALTREX) 500 MG tablet, Take 1 tablet by mouth twice a day, Disp: 14 tablet, Rfl: 2    ALLERGIES:  Review of patient's allergies indicates:   Allergen Reactions    Percocet [oxycodone-acetaminophen] Nausea And Vomiting     Has not tried antiemetic with narcotics.      REVIEW OF SYSTEMS:  Constitution: Negative. Negative for chills, fever and night sweats.    Hematologic/Lymphatic: Negative for bleeding problem. Does not bruise/bleed easily.   Skin: Negative for dry skin, itching and rash.   Musculoskeletal: Negative for falls. Positive for left elbow pain and muscle weakness.     All other review of symptoms were reviewed and found to be noncontributory.     PHYSICAL EXAMINATION:  Vitals:  [facility] 06/13/2021 Comment: ANAID / BSO  6-23-21   General: Well-developed well-nourished 49 y.o. femalein no acute distress   Cardiovascular: Regular rhythm by palpation of distal pulse, normal color and temperature, no concerning varicosities on symptomatic side   Lungs: No labored breathing or wheezing appreciated   Neuro: Alert and oriented ×3   Psychiatric: well oriented to person, place and time, demonstrates normal mood and affect   Skin: No rashes, lesions or ulcers, normal temperature, turgor, and texture on uninvolved extremity    Ortho/SPM Exam  Examination of the left elbow again demonstrates some pain to palpation over the lateral epicondyle and common extensor bundle.  Really no tenderness over the volar mobile wad and radial tunnel region.  She does have typical laterally based pain with resisted wrist extension.  Nontender over the intact distal biceps.  Nontender over the medial epicondyle  and distal triceps.  Full elbow and forearm range of motion.  Stable to varus and valgus stress.      IMAGING:  Xrays including AP, Lateral and Radial Capitallar views of the left elbow are ordered / images reviewed by me:   Small area of calcification over the lateral aspect of the left elbow in the region of the epicondyle.    ASSESSMENT:      ICD-10-CM ICD-9-CM   1. Left lateral epicondylitis  M77.12 726.32   2. Left elbow pain  M25.522 719.42     PLAN:     Findings discussed with the patient and her .  Recurrent lateral epicondylitis.  Discussed both operative and nonoperative treatment options.  She elects for additional conservative treatment which I think is reasonable.  We discussed repeat steroid versus PRP/ACP.  She would like to pursue the biologic injection options.  I will set her up with my primary care sports medicine colleague Dr. Martha Valderrama to discuss that options further.  I gave her a counterforce strap.  OT referral placed for additional treatment and modalities.  If pain is persistent or recurrent despite these measures, we will have to consider the surgical treatment option in this case.  I will see her back as needed.    Procedures

## 2025-05-13 ENCOUNTER — HOSPITAL ENCOUNTER (OUTPATIENT)
Dept: RADIOLOGY | Facility: HOSPITAL | Age: 50
Discharge: HOME | End: 2025-05-13
Attending: ORTHOPAEDIC SURGERY
Payer: COMMERCIAL

## 2025-05-13 ENCOUNTER — TELEPHONE (OUTPATIENT)
Dept: SPORTS MEDICINE | Facility: CLINIC | Age: 50
End: 2025-05-13
Payer: COMMERCIAL

## 2025-05-13 ENCOUNTER — OFFICE VISIT (OUTPATIENT)
Dept: SPORTS MEDICINE | Facility: CLINIC | Age: 50
End: 2025-05-13
Payer: COMMERCIAL

## 2025-05-13 DIAGNOSIS — M25.522 LEFT ELBOW PAIN: ICD-10-CM

## 2025-05-13 DIAGNOSIS — M77.12 LEFT LATERAL EPICONDYLITIS: Primary | ICD-10-CM

## 2025-05-13 DIAGNOSIS — M77.12 LEFT LATERAL EPICONDYLITIS: ICD-10-CM

## 2025-05-13 PROCEDURE — 73030 X-RAY EXAM OF SHOULDER: CPT | Mod: TC,LT

## 2025-05-13 PROCEDURE — 73080 X-RAY EXAM OF ELBOW: CPT | Mod: TC,LT

## 2025-05-13 PROCEDURE — 99999 PR PBB SHADOW E&M-EST. PATIENT-LVL II: CPT | Mod: PBBFAC,,, | Performed by: ORTHOPAEDIC SURGERY

## 2025-05-13 PROCEDURE — 73030 X-RAY EXAM OF SHOULDER: CPT | Mod: 26,LT,, | Performed by: RADIOLOGY

## 2025-05-13 PROCEDURE — 73080 X-RAY EXAM OF ELBOW: CPT | Mod: 26,LT,, | Performed by: INTERNAL MEDICINE

## 2025-05-13 PROCEDURE — 99214 OFFICE O/P EST MOD 30 MIN: CPT | Mod: S$GLB,,, | Performed by: ORTHOPAEDIC SURGERY

## 2025-05-13 NOTE — TELEPHONE ENCOUNTER
Spoke with patient in regards to ref to Dr. Valderrama for PRP injection of left elbow. Scheduled pt f/u appt to further discuss with Dr. Valderrama on June 3rd

## 2025-05-20 ENCOUNTER — OFFICE VISIT (OUTPATIENT)
Dept: ALLERGY | Facility: CLINIC | Age: 50
End: 2025-05-20
Payer: COMMERCIAL

## 2025-05-20 VITALS — HEIGHT: 61 IN | BODY MASS INDEX: 24.26 KG/M2 | WEIGHT: 128.5 LBS

## 2025-05-20 DIAGNOSIS — J30.81 ALLERGIC RHINITIS DUE TO ANIMAL (CAT) (DOG) HAIR AND DANDER: ICD-10-CM

## 2025-05-20 DIAGNOSIS — R51.9 FACE PAIN: Primary | ICD-10-CM

## 2025-05-20 DIAGNOSIS — J30.89 ALLERGIC RHINITIS DUE TO HOUSE DUST MITE: ICD-10-CM

## 2025-05-20 PROCEDURE — 99999 PR PBB SHADOW E&M-EST. PATIENT-LVL III: CPT | Mod: PBBFAC,,, | Performed by: STUDENT IN AN ORGANIZED HEALTH CARE EDUCATION/TRAINING PROGRAM

## 2025-05-20 PROCEDURE — 1160F RVW MEDS BY RX/DR IN RCRD: CPT | Mod: CPTII,S$GLB,, | Performed by: STUDENT IN AN ORGANIZED HEALTH CARE EDUCATION/TRAINING PROGRAM

## 2025-05-20 PROCEDURE — 1159F MED LIST DOCD IN RCRD: CPT | Mod: CPTII,S$GLB,, | Performed by: STUDENT IN AN ORGANIZED HEALTH CARE EDUCATION/TRAINING PROGRAM

## 2025-05-20 PROCEDURE — 99204 OFFICE O/P NEW MOD 45 MIN: CPT | Mod: S$GLB,,, | Performed by: STUDENT IN AN ORGANIZED HEALTH CARE EDUCATION/TRAINING PROGRAM

## 2025-05-20 PROCEDURE — 3008F BODY MASS INDEX DOCD: CPT | Mod: CPTII,S$GLB,, | Performed by: STUDENT IN AN ORGANIZED HEALTH CARE EDUCATION/TRAINING PROGRAM

## 2025-05-20 RX ORDER — PSEUDOEPHEDRINE HYDROCHLORIDE 60 MG/1
60 TABLET ORAL EVERY 4 HOURS PRN
COMMUNITY

## 2025-05-20 NOTE — PROGRESS NOTES
"Allergy Clinic Note  [facility]    This note was created by combination of typed  and dictation. Transcription errors are likely.  If there are any questions, please contact me.    HISTORY      Patient ID: Adrienne Andrew is a 49 y.o. female.    Chief Complaint: sinus pressure/headaches  and Allergies (Mild PND )      Referring Provider: Self, Aaareferral       History of Present Illness       Adrienne Andrew is a 49 y.o. female presents for evaluation of headaches that she attributes to "allergies"      Related medications and other interventions  Astelin  (Metoprolol)    5/20/2025:  At initial visit, Mrs Andrew reported  episodic facial pressure for several years.  This has located in the bilateral maxillary area.  She denies associated nasal congestion, runny nose, itching, or eye symptoms.  She does admit to feeling something weird in the back of her throat sometimes when she swallows.  Face pain is present a proximally 50% of the time and seems to appear in clusters.  Clusters last 4 a few days.  She can have symptom-free intervals for up to 2 weeks.  There is no clear seasonal pattern.  There may be a component of symptoms with weather changes, but not exclusively.  Symptoms were also present in [place].  Symptoms were previously helped by Neti pot but no longer.  She obtains some benefit from pseudoephedrine but this sometimes causes her to feel "weird" or jittery.  Immunocap testing ordered at ENT in 2023 is positive for dust mite (class 2) and cat (class 1).    Client reports that cat exposure is associated with acute rhinitis symptoms, but that she has no regular exposure to cats.          MEDICAL HISTORY      Significant past medical history: incomplete bladder emptying  Active Problem List reviewed  Smoking Hx:  Client  reports that she has never smoked. She has never been exposed to tobacco smoke. She has never used smokeless tobacco.    Meds: MAR " "reviewed    Significant family history:  No nasal allergies or asthma  Physician .  Exposures: No household pets, smoke or mold.  Son has dog (no Sx).  No cat exposure.  Has down comforter and dust proof pillow cover from [employer] on one of her pillows.  ENT surgery:  No    Asthma: No  Eczema: No       Review of Systems    CONST: no F/C/NS, no unintentional weight changes  NEURO:  no tremor, no weakness  EYES: no discharge, no erythema  EARS: no hearing loss, no sensation of fullness  PULM:  no SOB, no wheezing, no cough  CV: no CP, no palpitations  DERM: no skin breaks         PHYSICAL EXAM    Ht 5' 1" (1.549 m)   Wt 58.3 kg (128 lb 8.5 oz)   LMP 06/13/2021 Comment: ANAID / BSO  6-23-21  BMI 24.29 kg/m²   GEN: Awake and alert, no distress  DERM:  No flushing, no rashes  EYE:  No ocular discharge, no redness  HEENT: No nasal discharge, no hoarseness, TMs are translucent bilaterally.  Nares are pink with no significant turbinate swelling.  Oropharynx is benign without exudate.  Tongue is not coated.  NECK:  No LAD  PULM: Normal work of breathing, no cough, CTA  COR:  RRR, normal capillary refill  NEURO:  No focal deficit, speech fluent and logical  PSYCH: appropriate affect, normal behavior        MEDICAL DECISION-MAKING           Data reviewed:      New entries in bold face        Allergy Testing      2023 inhalant ImmunoCAPs positive for dust mite and cat     Class II dust mite (Df and Dp)     Class I  cat   All other inhalant allergens less than 0.35 KU/L.  Ragweed 0.12; Alternaria 0.28; dog 0.31.      Lab results      Eo count 100, 2024      Imaging and other diagnostics        Medical records review      Diagnoses:   Adrienne Andrew is a 49 y.o. female. with  1. Face pain    2. Allergic rhinitis due to house dust mite    3. Allergic rhinitis due to animal (cat) (dog) hair and dander          Assessment / Plan / Orders   Ms Andrew has facial pressure that is helped somewhat by pseudoephedrine.  " She has known allergic sensitivity to dust mite (and also to cat) by 2023 Immunocap testing.   Today we discussed allergic treatment options:  environmental, pharmacologic and immune.  Discussed dust avoidance measures (especially getting rid of her down comforter and putting her shoulder pillow in the freezer) and handout given.  Discussed subcutaneous (SCIT) immunotherapy mechanism of action, regimen and risk/benefits briefly.  Handout given.   Continue Sudafed as needed.  Follow-up 1 month to follow up symptoms and for possible skin testing.       Comorbidities  None    Instructions and follow up     Patient Instructions       Recommend these dust avoidance measures:  Dust proof cover on all pillows that stay on the bed at night  No decorative pillows or stuffed animals on the bed at night.  Only the one stuffed animal, and put it in the freezer for 24 hours every week  No feathers or down on the bed  Wash bedding in hot water  Take a pillow cover (or your pillow) for vacations  Consider dust proof cover on mattress and other pillows  Consider dehumidifier    In my opinion, the best place to obtain dust proof covers is on-line at [facility] ([employer]).     Follow up in one month  Possible Skin testing next visit.  No antihistamines (Zyrtec, Benaryl, Theraflu, etc) for 5 days prior.          Follow up in about 4 weeks (around 6/17/2025).    Renee Berumen MD  Allergy, Asthma & Immunology      I spent a total of 48 minutes on the day of the visit.This includes face to face time and non-face to face time preparing to see the patient (eg, review of tests), obtaining and/or reviewing separately obtained history, documenting clinical information in the electronic or other health record, independently interpreting results and communicating results to the patient/family/caregiver, or care coordinator.

## 2025-05-20 NOTE — PATIENT INSTRUCTIONS
Recommend these dust avoidance measures:  Dust proof cover on all pillows that stay on the bed at night  No decorative pillows or stuffed animals on the bed at night.  Only the one stuffed animal, and put it in the freezer for 24 hours every week  No feathers or down on the bed  Wash bedding in hot water  Take a pillow cover (or your pillow) for vacations  Consider dust proof cover on mattress   Consider dehumidifier    In my opinion, the best place to obtain dust proof covers is on-line at [facility] ([email or web address]).     Follow up in one month  Possible Skin testing next visit.  No antihistamines (Zyrtec, Benaryl, Theraflu, etc) for 5 days prior.

## 2025-05-26 RX ORDER — METHOCARBAMOL 500 MG/1
500 TABLET, FILM COATED ORAL 3 TIMES DAILY PRN
Qty: 40 TABLET | Refills: 0 | Status: SHIPPED | OUTPATIENT
Start: 2025-05-26

## 2025-06-03 ENCOUNTER — OFFICE VISIT (OUTPATIENT)
Dept: SPORTS MEDICINE | Facility: CLINIC | Age: 50
End: 2025-06-03
Payer: COMMERCIAL

## 2025-06-03 VITALS
BODY MASS INDEX: 24.15 KG/M2 | DIASTOLIC BLOOD PRESSURE: 81 MMHG | WEIGHT: 127.88 LBS | HEART RATE: 82 BPM | SYSTOLIC BLOOD PRESSURE: 132 MMHG | HEIGHT: 61 IN

## 2025-06-03 DIAGNOSIS — M25.522 LEFT ELBOW PAIN: Primary | ICD-10-CM

## 2025-06-03 DIAGNOSIS — M77.12 LEFT LATERAL EPICONDYLITIS: ICD-10-CM

## 2025-06-03 DIAGNOSIS — M77.12 LEFT LATERAL EPICONDYLITIS: Primary | ICD-10-CM

## 2025-06-03 PROCEDURE — 99214 OFFICE O/P EST MOD 30 MIN: CPT | Mod: S$GLB,,, | Performed by: STUDENT IN AN ORGANIZED HEALTH CARE EDUCATION/TRAINING PROGRAM

## 2025-06-03 PROCEDURE — 3079F DIAST BP 80-89 MM HG: CPT | Mod: CPTII,S$GLB,, | Performed by: STUDENT IN AN ORGANIZED HEALTH CARE EDUCATION/TRAINING PROGRAM

## 2025-06-03 PROCEDURE — 1125F AMNT PAIN NOTED PAIN PRSNT: CPT | Mod: CPTII,S$GLB,, | Performed by: STUDENT IN AN ORGANIZED HEALTH CARE EDUCATION/TRAINING PROGRAM

## 2025-06-03 PROCEDURE — 1159F MED LIST DOCD IN RCRD: CPT | Mod: CPTII,S$GLB,, | Performed by: STUDENT IN AN ORGANIZED HEALTH CARE EDUCATION/TRAINING PROGRAM

## 2025-06-03 PROCEDURE — 99999 PR PBB SHADOW E&M-EST. PATIENT-LVL III: CPT | Mod: PBBFAC,,, | Performed by: STUDENT IN AN ORGANIZED HEALTH CARE EDUCATION/TRAINING PROGRAM

## 2025-06-03 PROCEDURE — 1160F RVW MEDS BY RX/DR IN RCRD: CPT | Mod: CPTII,S$GLB,, | Performed by: STUDENT IN AN ORGANIZED HEALTH CARE EDUCATION/TRAINING PROGRAM

## 2025-06-03 PROCEDURE — 3075F SYST BP GE 130 - 139MM HG: CPT | Mod: CPTII,S$GLB,, | Performed by: STUDENT IN AN ORGANIZED HEALTH CARE EDUCATION/TRAINING PROGRAM

## 2025-06-03 PROCEDURE — 3008F BODY MASS INDEX DOCD: CPT | Mod: CPTII,S$GLB,, | Performed by: STUDENT IN AN ORGANIZED HEALTH CARE EDUCATION/TRAINING PROGRAM

## 2025-06-03 RX ORDER — MELOXICAM 15 MG/1
15 TABLET ORAL DAILY
Qty: 30 TABLET | Refills: 2 | Status: SHIPPED | OUTPATIENT
Start: 2025-06-03

## 2025-07-07 ENCOUNTER — PATIENT MESSAGE (OUTPATIENT)
Dept: ALLERGY | Facility: CLINIC | Age: 50
End: 2025-07-07
Payer: COMMERCIAL

## 2025-07-08 ENCOUNTER — TELEPHONE (OUTPATIENT)
Dept: SPORTS MEDICINE | Facility: CLINIC | Age: 50
End: 2025-07-08
Payer: COMMERCIAL

## 2025-07-08 NOTE — TELEPHONE ENCOUNTER
Called and LVM regarding the pt's appt with Dr. Valderrama at the [place] location in [facility]. Stated to arrive 15 minutes prior to her appt time to ensure enough hans to check-in and do intake on the 1st floor before being directed to the 4th floor of the same building to be seen by Dr. Valderrama.

## 2025-07-09 ENCOUNTER — OFFICE VISIT (OUTPATIENT)
Dept: OPTOMETRY | Facility: CLINIC | Age: 50
End: 2025-07-09
Payer: COMMERCIAL

## 2025-07-09 DIAGNOSIS — H52.13 MYOPIA OF BOTH EYES: Primary | ICD-10-CM

## 2025-07-09 PROCEDURE — 99999 PR PBB SHADOW E&M-EST. PATIENT-LVL II: CPT | Mod: PBBFAC,,, | Performed by: OPTOMETRIST

## 2025-07-09 PROCEDURE — 92015 DETERMINE REFRACTIVE STATE: CPT | Mod: S$GLB,,, | Performed by: OPTOMETRIST

## 2025-07-09 PROCEDURE — 92014 COMPRE OPH EXAM EST PT 1/>: CPT | Mod: S$GLB,,, | Performed by: OPTOMETRIST

## 2025-07-09 NOTE — PROGRESS NOTES
HPI    Adrienne Andrew is a 49 y.o. female who returns for continued eye care.   Adrienne has mild bilateral myopia. Acuvue Marilu contact lenses are   prescribed and worn full time for visual correction.  Her last exam with   me was on 06/04/2024 . Today, she reports that comfort, handling and   vision with the contact lenses are good.  She confirms that she  has not   noticed any new or specific ocular or visual symptoms.      (--)blurred vision  (--)Headaches  (--)diplopia  (--)flashes  (--)floaters  (--)pain  (--)Itching  (--)tearing  (--)burning  (--)Dryness  (--) OTC Drops  (--)Photophobia     Last edited by Jung Fuentes, OD on 7/9/2025 10:39 AM.      Review of Systems   Constitutional:  Negative for chills, fever and malaise/fatigue.   HENT:  Negative for congestion.    Eyes:  Negative for blurred vision, double vision, photophobia, pain, discharge and redness.   Respiratory:  Negative for cough.    Gastrointestinal:  Negative for nausea and vomiting.   Neurological:  Negative for seizures.     For exam results, see encounter report    Assessment /Plan    1. Myopia of both eyes --> stable  - same specs ok  Eyeglasses Prescription (7/9/2025)          Sphere Cylinder    Right -1.50 Sphere    Left -1.50 Sphere      Type: SVL    Expiration Date: 7/9/2026          - CLRx per below for 1 month disposal/replacement    -Advised against overnight wear, risks of overnight wear explained;     -Systane Balance, Soothe XP, Refresh Optive Advanced artificial tears for comfort prn;    -Optifree Puremoist, Biotrue , Acuvue RevitaLens, or Clear Care or Clear Care Plus solutions recommended  Contact Lens Prescription (7/9/2025)          Brand Base Curve Diameter Sphere    Right Acuvue Amrilu 8.4 14.0 -1.50    Left Acuvue Marilu 8.4 14.0 -1.50      Expiration Date: 7/9/2026    Replacement: Monthly    Solutions: OptiFree PureMoist    Wearing Schedule: Daily Wear          2. Ocular health intact    Parent education; RTC in 1 year  with DFE; Ok to instill 0.5% Tropicamide after (normal) baseline workup, sooner as needed at [facility]

## 2025-07-10 ENCOUNTER — PROCEDURE VISIT (OUTPATIENT)
Dept: SPORTS MEDICINE | Facility: CLINIC | Age: 50
End: 2025-07-10

## 2025-07-10 VITALS
SYSTOLIC BLOOD PRESSURE: 139 MMHG | HEIGHT: 61 IN | HEART RATE: 76 BPM | WEIGHT: 125 LBS | DIASTOLIC BLOOD PRESSURE: 88 MMHG | BODY MASS INDEX: 23.6 KG/M2

## 2025-07-10 DIAGNOSIS — M25.522 LEFT ELBOW PAIN: Primary | ICD-10-CM

## 2025-07-10 DIAGNOSIS — M77.12 LEFT LATERAL EPICONDYLITIS: ICD-10-CM

## 2025-07-10 NOTE — PROCEDURES
"Subjective:     Adrienne Andrew     Chief Complaint   Patient presents with    Left Elbow - Pain    Procedure       Adrienne is a 49 y.o. female coming in today for an Autologous Conditioned Plasma (ACP) injection. Written consent obtained prior to procedure.     Objective:     VITAL SIGNS: /88   Pulse 76   Ht 5' 1" (1.549 m)   Wt 56.7 kg (125 lb)   LMP 06/13/2021 Comment: ANAID / ADRIANA  6-23-21  BMI 23.62 kg/m²      PRE-PROCEDURE DIAGNOSIS:   1. Left elbow pain        2. Left lateral epicondylitis            PROCEDURE TYPE: ACP injection to the left elbow extensor tendon bundle under ultrasound guidance    ARTHREX ACP KIT SERIES I INFORMATION:  LOT: [id number]  REF: [id number]  EXP: 01-    RISKS, BENEFITS, ADVERSE EFFECTS:  We reviewed that this is a medical procedure involving first the harvesting of some of patients own blood. This tissue will be minimally manipulated and prepared for injection; and finally the tissue will be injected into damaged ligaments, tendons, muscle and/or joints, for the purpose of strengthening and healing and/or lessening pain from the damaged areas. The diagnosis, the nature of the treatment, and the theoretical basis of regenerative medicine has been explained. The patient was given the opportunity to ask any questions concerning the specific procedure. We further reviewed that in many medical circles this is considered experimental and/or investigational. It is not standard of care in many medical communities. It is also not covered under insurance. We further reviewed this treatment is part of a treatment program, and all parts of the program are essential to a successful outcome, including but not limited to post-injection physical therapy at home and/or in office. As part of a treatment program, this procedure treatment may involve several injections given at each treatment session AND may require multiple treatment sessions. We reviewed that, while this is " "inherently a safe procedure, there are risks involved. The specific risks depend on the tissue being aspirated and the reinjection site of treatment.     RISKS INCLUDE, but not be limited to:   COMMON:   -Soreness and/or moderately increased pain, with bruising for several days after each treatment  -typically decreased range of motion from what the patient might feel on most days   -Improvement in pain and function may take 4-12 weeks, occasionally longer in some cases   RARE:    - Patient expressed understanding that risks can include, but not be limited to, Infection, worsening pain, headache, bleeding (bruising or hematoma) allergic reaction (itching, rash, shortness of breath, seizures, and even death), and other rare risks can be present due to the nature of the location of the procedure including temporary or permanent nerve damage.     POTENTIAL BENEFITS include: reduction or elimination of pain and greater function. We also reviewed alternative or conventional methods of treatment, and the probability that the proposed treatment will or will not be successful. No guarantee or assurance has been made to me relative to results that may be expected from these techniques. Furthermore, patient understands that he/she/they may withdraw from treatment at any time.     Written consent obtained from patient prior to performing procedure.     PROCEDURE DETAILS:     Sterile technique was used to phlebotomize 15 mL of the patients blood from a peripheral vein. This blood was  into density-divided layers using an MagTag ACP centrifuge. The layer of ACP, a volume of 5.5mL, was placed into a sterile syringe in preparation for injection.    Timeout: Prior to procedure the correct patient, procedure, and site was verified     Joint: The tip of the 21 gauge 2" needle was used to enter the lrft elbow extensor tendon bundle . 5.5mL of autologous conditioned plasma was injected in total.       The procedure was " tolerated well. Immobilization of the tendon was obtained and was instructed to be maintained for 12-24 hours. .     DISCHARGE CONDITION: The patient tolerated the procedure well and there were no immediate complications. The patient was instructed to call the clinic immediately for any mild to moderate adverse side effects, or to call 911 in the event of an emergency.    Complications: none     Estimated blood loss from injection procedure: none     Joint aspirate volume: 0 mL    POST PROCEDURE INSTRUCTIONS: Post-procedure care  Following the procedure, a sterile bandage was placed over the injection site.     Description of ultrasound utilization for needle guidance:  Ultrasound guidance was used for needle localization with [employer] Edge 2, 9-L MHz linear probe(s). Images were saved and stored for documentation. The left elbow extensor tendon bundle  was visualized. Dynamic visualization of the needle(s)/probe was continuous throughout the procedure and maintained good position and correct needle placement.        Assessment:      Encounter Diagnoses   Name Primary?    Left elbow pain Yes    Left lateral epicondylitis           Plan:     Autologous Condition Plasma injection procedure performed today for left elbow extensor tendon bundle   (see details above).  Take home instructions handout given to patient.   - Recommend Extra-strength Tylenol (Acetaminophen 500mg) 2 tables every 4-6 hours, but not to exceed 3000mg (6 pills) in a day.  - recommend avoiding NSAIDs for at least 21 days, but ideally throughout the entire treatment period    2. Follow-up in 3 weeks for reevaluation    3. Patient agreeable to today's plan and all questions were answered    This note is dictated using the M*Modal Fluency Direct word recognition program. There are word recognition mistakes that are occasionally missed on review.

## 2025-07-24 ENCOUNTER — PATIENT MESSAGE (OUTPATIENT)
Dept: OPTOMETRY | Facility: CLINIC | Age: 50
End: 2025-07-24
Payer: COMMERCIAL

## 2025-07-31 ENCOUNTER — CLINICAL SUPPORT (OUTPATIENT)
Dept: REHABILITATION | Facility: OTHER | Age: 50
End: 2025-07-31
Payer: COMMERCIAL

## 2025-07-31 DIAGNOSIS — M25.622 DECREASED RANGE OF MOTION OF LEFT ELBOW: ICD-10-CM

## 2025-07-31 DIAGNOSIS — M25.522 LEFT ELBOW PAIN: ICD-10-CM

## 2025-07-31 DIAGNOSIS — M62.81 MUSCLE WEAKNESS OF LEFT UPPER EXTREMITY: ICD-10-CM

## 2025-07-31 DIAGNOSIS — M77.12 LEFT LATERAL EPICONDYLITIS: ICD-10-CM

## 2025-07-31 PROCEDURE — 97140 MANUAL THERAPY 1/> REGIONS: CPT | Mod: PN

## 2025-07-31 PROCEDURE — 97110 THERAPEUTIC EXERCISES: CPT | Mod: PN

## 2025-07-31 PROCEDURE — 97161 PT EVAL LOW COMPLEX 20 MIN: CPT | Mod: PN

## 2025-07-31 PROCEDURE — 97112 NEUROMUSCULAR REEDUCATION: CPT | Mod: PN

## 2025-08-03 PROBLEM — M25.522 LEFT ELBOW PAIN: Status: ACTIVE | Noted: 2025-08-03

## 2025-08-03 PROBLEM — M62.81 MUSCLE WEAKNESS OF LEFT UPPER EXTREMITY: Status: ACTIVE | Noted: 2025-08-03

## 2025-08-03 PROBLEM — M77.12 LEFT LATERAL EPICONDYLITIS: Status: ACTIVE | Noted: 2025-08-03

## 2025-08-03 NOTE — PROGRESS NOTES
Outpatient Rehab    Physical Therapy Evaluation    Patient Name: Adreinne Andrew  MRN: [id number]  YOB: 1975  Encounter Date: 7/31/2025    Therapy Diagnosis:   Encounter Diagnoses   Name Primary?    Left elbow pain     Left lateral epicondylitis     Decreased range of motion of left elbow     Muscle weakness of left upper extremity      Physician: Martha Valderrama MD    Physician Orders: Eval and Treat  Medical Diagnosis: Left elbow pain  Left lateral epicondylitis  Surgical Diagnosis: Not applicable for this Episode   Surgical Date: Not applicable for this Episode  Days Since Last Surgery: Not applicable for this Episode    Visit # / Visits Authorized:  1 / 1  Insurance Authorization Period: 7/10/2025 to 7/10/2026  Date of Evaluation: 7/31/2025  Plan of Care Certification: 7/31/2025 to 10/15/25     Time In: 1502   Time Out: 1600  Total Time (in minutes): 58   Total Billable Time (in minutes): 58    Intake Outcome Measure for FOTO Survey    Therapist reviewed FOTO scores for Adrienne Andrew on 7/31/2025.   FOTO report - see Media section or FOTO account episode details.     Intake Score (%): Not applicable for this Episode    Precautions:       Subjective   History of Present Illness  Adrienne is a 49 y.o. female who reports to physical therapy with a chief concern of Left lateral elbow pain.     The patient reports a medical diagnosis of M25.522 (ICD-10-CM) - Left elbow pain  M77.12 (ICD-10-CM) - Left lateral epicondylitis. The patient has experienced this issue since 07/31/25.           Diagnostic tests related to this condition: X-ray.     X-Ray Details: There is a small calcification adjacent to the lateral epicondyle. Osseous structures are intact without fracture or osseous destruction.  No significant degenerative changes.    History of Present Condition/Illness: Adrienne is a 49 year-old female who presents to physical therapy with a report of left elbow pain. Adrienne reports her  pain started about 1.5 years ago which may have been due to overuse of gripping dumbbells during her workouts. She states she underwent a cortisone shot last year which relieved her pain for about 9 months. Pain has returned as Adrienne states she underwent a PRP injection 3 weeks ago. She has not noticed a change in her pain since the PRP injection. Adrienne states her pain is located at the lateral elbow and is aggravated by gripping and elbow extension. She has not found anything to relieve her pain since her cortisone injection. Adrienne states she has not been able to work out with weights due to her elbow pain. Adrienne would like to attend physical therapy to reduce her elbow pain and return to her normal workouts.          Pain     Patient reports a current pain level of 0/10. Pain at best is reported as 0/10. Pain at worst is reported as 8/10.   Location: Left lateral elbow pain  Clinical Progression (since onset): Unchanged  Pain Qualities: Sharp, Other (Comment)  Other Pain Qualities: Stiff  Pain-Aggravating Factors: Other (Comment)  Other Pain-Aggravating Factors: Gripping, elbow extension         Living Arrangements  Living Situation  Housing: Home independently  Living Arrangements: Family members        Employment  Does the patient's condition impact their ability to work?: No     Adrienne works as an artist, patient is right-handed.      Past Medical History/Physical Systems Review:   Adrienne Andrew  has a past medical history of Anemia, History of robot-assisted laparoscopic hysterectomy, Hormone replacement therapy (HRT), and PONV (postoperative nausea and vomiting).    Adrienne Andrew  has a past surgical history that includes [place] tooth extraction; Dilation and curettage of uterus; Fixation of tendon (Right, 1/16/2020); Arthroscopy of shoulder with decompression of subacromial space (Right, 1/16/2020); Arthroscopic debridement of shoulder (Right, 1/16/2020); Vaginal delivery (2004,  2006, 2009); Robot-assisted laparoscopic abdominal hysterectomy using da Alexis Xi (Bilateral, 6/23/2021); Cystoscopy (N/A, 6/23/2021); Robot-assisted laparoscopic salpingo-oophorectomy using da Alexis Xi (Bilateral, 6/23/2021); Robot-assisted laparoscopic lysis of adhesions using da Alexis Xi (6/23/2021); Hysterectomy (06/23/2021); and Oophorectomy (06/23/2021).    Adrienne has a current medication list which includes the following prescription(s): COMPOUND HORMONE REPLACEMENT, estradiol, meloxicam, methocarbamol, intrarosa, intrarosa, pseudoephedrine, and sulconazole.    Review of patient's allergies indicates:   Allergen Reactions    Percocet [oxycodone-acetaminophen] Nausea And Vomiting     Has not tried antiemetic with narcotics.         Objective      Elbow/Forearm Range of Motion   Right Elbow/Forearm   Active (deg) Passive (deg) Pain   Flexion 150 155     Extension 5 10     Forearm Pronation 85 90     Forearm Supination 85 90       Left Elbow/Forearm   Active (deg) Passive (deg) Pain   Flexion 145 150     Extension -5 0 Yes   Forearm Pronation 75 80     Forearm Supination 75 80 Yes            Wrist Range of Motion  Right Wrist   Active (deg) Passive (deg) Pain Comment   Flexion 85 90       Extension 65 75       Radial Deviation 30 35       Ulnar Deviation 45 50         Left Wrist   Active (deg) Passive (deg) Pain Comment   Flexion 85 90       Extension 60 70       Radial Deviation 30 35       Ulnar Deviation 50 55                       Shoulder Strength - Planes of Motion   Right Strength Right Pain Left Strength Left  Pain   Flexion           Extension           ABduction           ADduction           Horizontal ABduction           Horizontal ADduction           Internal Rotation 0°           Internal Rotation 90°           External Rotation 0°           External Rotation 90°               Shoulder Strength - Scapular Stabilizing Muscles   Right Strength Right Pain Left Strength Left  Pain   Lower Trapezius 4    "3     Middle Trapezius 4   3     Rhomboids 5   4       Shoulder Strength Details  Serratus Anterior: R - 4+/5, L - 3+/5    Elbow Strength   Right Strength Right Pain Left Strength Left  Pain   Flexion (C6) 5   4     Extension (C7) 5   3+          Forearm Strength   Right Strength Right Pain Left Strength Left  Pain   Pronation 5   4     Supination 5   3+         Wrist Strength - Planes of Motion   Right Strength Right Pain Left Strength Left  Pain   Flexion 5   4     Extension 5   3+ Yes   Radial Deviation 5   4     Ulnar Deviation (C8) 5   3+ Yes            Elbow Joint Mobility  Right Elbow Joint Mobility  Normal: Humeroulnar, Humeroradial, Proximal Radioulnar, and Distal Radioulnar  Left Elbow Joint Mobility  Normal: Humeroradial and Distal Radioulnar  Hypomobile: Humeroulnar and Proximal Radioulnar              Treatment:  Therapeutic Exercise  TE 1: Active wrist flexion/extension x 20  TE 2: Active elbow flexion/extension x 20  Manual Therapy  MT 1: Medial/lateral UHJ mobilizations grade III  Balance/Neuromuscular Re-Education  NMR 1: Scapula retractions x 30 with 5" holds  NMR 2: Isometric wrist extension 2 x 10, 5" holds    Time Entry(in minutes):  PT Evaluation (Low) Time Entry: 27  Manual Therapy Time Entry: 10  Neuromuscular Re-Education Time Entry: 11  Therapeutic Exercise Time Entry: 10    Assessment & Plan   Assessment  Adrienne presents with a condition of Low complexity.   Presentation of Symptoms: Stable  Will Comorbidities Impact Care: No       Functional Limitations: Activity tolerance, Completing self-care activities, Proprioception, Range of motion, Participating in leisure activities, Pain with ADLs/IADLs, Manipulating objects, Pain when reaching, Participating in sports, Reaching  Impairments: Pain with functional activity, Impaired physical strength, Abnormal or restricted range of motion, Lack of appropriate home exercise program    Patient Goal for Therapy (PT): Reduce pain, return to exercise " routine  Prognosis: Fair  Assessment Details: Adrienne presents to physical therapy with chronic left lateral elbow pain. Examination revealed limited left elbow/forearm range of motion, decreased left upper extremity strength, and mobility deficits of the left elbow. Adrienne will benefit from a course of physical therapy that focuses on normalizing her range of motion and progressing resisted exercises from isometrics to light eccentrics to concentric.     Plan  From a physical therapy perspective, the patient would benefit from: Skilled Rehab Services    Planned therapy interventions include: Therapeutic exercise, Therapeutic activities, Neuromuscular re-education, Manual therapy, ADLs/IADLs, and Other (Comment). Dry Needling (prn)  Planned modalities to include: Biofeedback, Electrical stimulation - attended, Electrical stimulation - passive/unattended, Thermotherapy (hot pack), and Cryotherapy (cold pack).        Visit Frequency: 1 times Per Week for 10 Weeks.       This plan was discussed with Patient.   Discussion participants: Agreed Upon Plan of Care  Plan details: Frequency and duration of treatment to be adjusted as needed          The patient's spiritual, cultural, and educational needs were considered, and the patient is agreeable to the plan of care and goals.           Goals:   Active       Long term goals       Patient will demonstrate <10% deficit of wrist extension strength when compared to the right.        Start:  08/03/25    Expected End:  10/12/25            Patient will demonstrate <10%  strength deficit when compared to the right.        Start:  08/03/25    Expected End:  10/12/25            Patient will return to exercising in the gym using dumbbells without elbow pain.        Start:  08/03/25    Expected End:  10/12/25            Patient will improve her FOTO score to >62 to represent a change in functional status.        Start:  08/03/25    Expected End:  10/12/25               Short term  goals       Patient will improve her left elbow extension equal to her right.        Start:  08/03/25    Expected End:  09/07/25            Patient will demonstrate full left forearm supination.        Start:  08/03/25    Expected End:  09/07/25            Patient will improve her left middle and low trap strength to at least 4/5.        Start:  08/03/25    Expected End:  09/07/25            Patient will improve her wrist extension strength to within 30% of her right.        Start:  08/03/25    Expected End:  09/07/25                Idris Vidales, PT, DPT, SCS  Board Certified Sports Certified Specialist

## 2025-08-04 ENCOUNTER — PATIENT MESSAGE (OUTPATIENT)
Dept: REHABILITATION | Facility: OTHER | Age: 50
End: 2025-08-04
Payer: COMMERCIAL

## 2025-08-06 ENCOUNTER — CLINICAL SUPPORT (OUTPATIENT)
Dept: REHABILITATION | Facility: OTHER | Age: 50
End: 2025-08-06
Payer: COMMERCIAL

## 2025-08-06 DIAGNOSIS — M25.622 DECREASED RANGE OF MOTION OF LEFT ELBOW: Primary | ICD-10-CM

## 2025-08-06 DIAGNOSIS — Z12.31 OTHER SCREENING MAMMOGRAM: ICD-10-CM

## 2025-08-06 DIAGNOSIS — M62.81 MUSCLE WEAKNESS OF LEFT UPPER EXTREMITY: ICD-10-CM

## 2025-08-06 PROCEDURE — 97140 MANUAL THERAPY 1/> REGIONS: CPT | Mod: PN

## 2025-08-06 PROCEDURE — 97110 THERAPEUTIC EXERCISES: CPT | Mod: PN

## 2025-08-06 PROCEDURE — 97112 NEUROMUSCULAR REEDUCATION: CPT | Mod: PN

## 2025-08-07 ENCOUNTER — OFFICE VISIT (OUTPATIENT)
Dept: INTERNAL MEDICINE | Facility: CLINIC | Age: 50
End: 2025-08-07
Payer: COMMERCIAL

## 2025-08-07 ENCOUNTER — LAB VISIT (OUTPATIENT)
Dept: LAB | Facility: HOSPITAL | Age: 50
End: 2025-08-07
Attending: INTERNAL MEDICINE
Payer: COMMERCIAL

## 2025-08-07 VITALS
HEART RATE: 63 BPM | SYSTOLIC BLOOD PRESSURE: 130 MMHG | BODY MASS INDEX: 24.31 KG/M2 | WEIGHT: 128.75 LBS | OXYGEN SATURATION: 99 % | HEIGHT: 61 IN | DIASTOLIC BLOOD PRESSURE: 82 MMHG

## 2025-08-07 DIAGNOSIS — Z00.00 VISIT FOR ANNUAL HEALTH EXAMINATION: Primary | ICD-10-CM

## 2025-08-07 DIAGNOSIS — Z00.00 VISIT FOR ANNUAL HEALTH EXAMINATION: ICD-10-CM

## 2025-08-07 DIAGNOSIS — M25.522 LEFT ELBOW PAIN: ICD-10-CM

## 2025-08-07 LAB
ABSOLUTE EOSINOPHIL (OHS): 0.1 K/UL
ABSOLUTE MONOCYTE (OHS): 0.42 K/UL (ref 0.3–1)
ABSOLUTE NEUTROPHIL COUNT (OHS): 5.27 K/UL (ref 1.8–7.7)
ALBUMIN SERPL BCP-MCNC: 3.8 G/DL (ref 3.5–5.2)
ALP SERPL-CCNC: 44 UNIT/L (ref 40–150)
ALT SERPL W/O P-5'-P-CCNC: 23 UNIT/L (ref 0–55)
ANION GAP (OHS): 8 MMOL/L (ref 8–16)
AST SERPL-CCNC: 25 UNIT/L (ref 0–50)
BASOPHILS # BLD AUTO: 0.04 K/UL
BASOPHILS NFR BLD AUTO: 0.5 %
BILIRUB SERPL-MCNC: 0.5 MG/DL (ref 0.1–1)
BUN SERPL-MCNC: 12 MG/DL (ref 6–20)
CALCIUM SERPL-MCNC: 8.8 MG/DL (ref 8.7–10.5)
CHLORIDE SERPL-SCNC: 108 MMOL/L (ref 95–110)
CHOLEST SERPL-MCNC: 159 MG/DL (ref 120–199)
CHOLEST/HDLC SERPL: 2.4 {RATIO} (ref 2–5)
CO2 SERPL-SCNC: 25 MMOL/L (ref 23–29)
CREAT SERPL-MCNC: 1 MG/DL (ref 0.5–1.4)
EAG (OHS): 91 MG/DL (ref 68–131)
ERYTHROCYTE [DISTWIDTH] IN BLOOD BY AUTOMATED COUNT: 13.9 % (ref 11.5–14.5)
GFR SERPLBLD CREATININE-BSD FMLA CKD-EPI: >60 ML/MIN/1.73/M2
GLUCOSE SERPL-MCNC: 86 MG/DL (ref 70–110)
HBA1C MFR BLD: 4.8 % (ref 4–5.6)
HCT VFR BLD AUTO: 42.6 % (ref 37–48.5)
HDLC SERPL-MCNC: 67 MG/DL (ref 40–75)
HDLC SERPL: 42.1 % (ref 20–50)
HGB BLD-MCNC: 13.7 GM/DL (ref 12–16)
IMM GRANULOCYTES # BLD AUTO: 0.03 K/UL (ref 0–0.04)
IMM GRANULOCYTES NFR BLD AUTO: 0.4 % (ref 0–0.5)
LDLC SERPL CALC-MCNC: 73.2 MG/DL (ref 63–159)
LYMPHOCYTES # BLD AUTO: 1.57 K/UL (ref 1–4.8)
MCH RBC QN AUTO: 30.2 PG (ref 27–31)
MCHC RBC AUTO-ENTMCNC: 32.2 G/DL (ref 32–36)
MCV RBC AUTO: 94 FL (ref 82–98)
NONHDLC SERPL-MCNC: 92 MG/DL
NUCLEATED RBC (/100WBC) (OHS): 0 /100 WBC
PLATELET # BLD AUTO: 287 K/UL (ref 150–450)
PMV BLD AUTO: 11.7 FL (ref 9.2–12.9)
POTASSIUM SERPL-SCNC: 3.8 MMOL/L (ref 3.5–5.1)
PROT SERPL-MCNC: 6.7 GM/DL (ref 6–8.4)
RBC # BLD AUTO: 4.54 M/UL (ref 4–5.4)
RELATIVE EOSINOPHIL (OHS): 1.3 %
RELATIVE LYMPHOCYTE (OHS): 21.1 % (ref 18–48)
RELATIVE MONOCYTE (OHS): 5.7 % (ref 4–15)
RELATIVE NEUTROPHIL (OHS): 71 % (ref 38–73)
SODIUM SERPL-SCNC: 141 MMOL/L (ref 136–145)
TRIGL SERPL-MCNC: 94 MG/DL (ref 30–150)
TSH SERPL-ACNC: 2.09 UIU/ML (ref 0.4–4)
WBC # BLD AUTO: 7.43 K/UL (ref 3.9–12.7)

## 2025-08-07 PROCEDURE — 84443 ASSAY THYROID STIM HORMONE: CPT

## 2025-08-07 PROCEDURE — 1159F MED LIST DOCD IN RCRD: CPT | Mod: CPTII,S$GLB,, | Performed by: INTERNAL MEDICINE

## 2025-08-07 PROCEDURE — 3075F SYST BP GE 130 - 139MM HG: CPT | Mod: CPTII,S$GLB,, | Performed by: INTERNAL MEDICINE

## 2025-08-07 PROCEDURE — 80053 COMPREHEN METABOLIC PANEL: CPT

## 2025-08-07 PROCEDURE — 1160F RVW MEDS BY RX/DR IN RCRD: CPT | Mod: CPTII,S$GLB,, | Performed by: INTERNAL MEDICINE

## 2025-08-07 PROCEDURE — 83036 HEMOGLOBIN GLYCOSYLATED A1C: CPT

## 2025-08-07 PROCEDURE — 80061 LIPID PANEL: CPT

## 2025-08-07 PROCEDURE — 36415 COLL VENOUS BLD VENIPUNCTURE: CPT

## 2025-08-07 PROCEDURE — 85025 COMPLETE CBC W/AUTO DIFF WBC: CPT

## 2025-08-07 PROCEDURE — 99999 PR PBB SHADOW E&M-EST. PATIENT-LVL IV: CPT | Mod: PBBFAC,,, | Performed by: INTERNAL MEDICINE

## 2025-08-07 PROCEDURE — 99396 PREV VISIT EST AGE 40-64: CPT | Mod: S$GLB,,, | Performed by: INTERNAL MEDICINE

## 2025-08-07 PROCEDURE — 3079F DIAST BP 80-89 MM HG: CPT | Mod: CPTII,S$GLB,, | Performed by: INTERNAL MEDICINE

## 2025-08-07 PROCEDURE — 3008F BODY MASS INDEX DOCD: CPT | Mod: CPTII,S$GLB,, | Performed by: INTERNAL MEDICINE

## 2025-08-07 NOTE — PROGRESS NOTES
INTERNAL MEDICINE ESTABLISHED PATIENT VISIT NOTE    Subjective:     Chief Complaint: Annual Exam       Patient ID: Adrienne Andrew is a 49 y.o. 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 Solano, last seen by me 7/2024, here today for annual exam.    Still followed by Dr. Rock for mgmt of HRT.    Today states she feels anxious because she heard what sounded like a loud fighter jet over her house last night.    Was seen by ortho last year for tennis elbow and discussed PRP/ACP injections.  Xrays done showed a small calcification adjacent to the lateral epicondyle.    Also seen by Dr. Berumen for sinus pressure h/a and allergies who rec dust proof covers on pillows etc.    Eye exam c Dr. Fuentes done 7/9/25.    Past Medical History:  Past Medical History:   Diagnosis Date    Anemia     History of robot-assisted laparoscopic hysterectomy 06/23/2021    Hormone replacement therapy (HRT)     PONV (postoperative nausea and vomiting)        Home Medications:  Prior to Admission medications    Medication Sig Start Date End Date Taking? Authorizing Provider   COMPOUND HORMONE REPLACEMENT Take by mouth once daily. PROGESTERONE  ([facility])   Yes Provider, Historical   estradioL (ESTRACE) 2 MG tablet Take 1 tablet (2 mg total) by mouth every morning. 10/11/22  Yes Diana Rock MD   meloxicam (MOBIC) 15 MG tablet Take 1 tablet (15 mg total) by mouth once daily. 6/3/25  Yes Hamilton Brown PA-C   prasterone, dhea, (INTRAROSA) 6.5 mg Inst Insert 1 suppository vaginally twice weekly 11/17/23  Yes    pseudoephedrine (SUDAFED) 60 MG tablet Take 60 mg by mouth every 4 (four) hours as needed for Congestion.   Yes Provider, Historical   methocarbamoL (ROBAXIN) 500 MG  Tab Take 1 tablet (500 mg total) by mouth 3 (three) times daily as needed (muscle spasms).  Patient not taking: Reported on 2025   Hamilton Brown PA-C   prasterone, dhea, (INTRAROSA) 6.5 mg Inst Insert 1 suppository vaginally every night for 30 days and then twice weekly  Patient not taking: Reported on 2025 10/11/22      sulconazole (EXELDERM) 1 % cream Apply topically once daily. 2/29/24 3/30/24  Samantha Lopez MD       Allergies:  Review of patient's allergies indicates:   Allergen Reactions    Percocet [oxycodone-acetaminophen] Nausea And Vomiting     Has not tried antiemetic with narcotics.        Social History:  Social History[1]     Review of Systems   Constitutional:  Negative for activity change and unexpected weight change.   HENT:  Negative for hearing loss, rhinorrhea and trouble swallowing.    Eyes:  Negative for discharge and visual disturbance.   Respiratory:  Negative for chest tightness and wheezing.    Cardiovascular:  Negative for chest pain and palpitations.   Gastrointestinal:  Negative for blood in stool, constipation, diarrhea and vomiting.   Endocrine: Negative for polydipsia and polyuria.   Genitourinary:  Negative for difficulty urinating, dysuria, hematuria and menstrual problem.   Musculoskeletal:  Positive for arthralgias. Negative for joint swelling and neck pain.   Neurological:  Negative for weakness and headaches.   Psychiatric/Behavioral:  Negative for confusion and dysphoric mood.          Health Maintenance:     Immunizations:   Influenza declined  TDap 2015, booster rec now.  Pt states she will get it at the pharmacy since she is driving to [place] for a  today.  Prevnar rec 52  Shingrix rec at 52  COVID vaccine recommended, pt declined, Risks and benefits were discussed with patient.     Cancer Screening:  PAP: 2021, NILM c neg EMB, s/p hyst, cervix removed, path benign (had benign fibroids and endometriomas of the ovaries)  Mammogram:   "8/2024 benign, will schedule repeat, order in system.  Colonoscopy:  pt declined for now, opted for cologuard done 7/2023, can repeat in 3 years since negative.    Objective:   /82   Pulse 63   Ht 5' 1" (1.549 m)   Wt 58.4 kg (128 lb 12 oz)   LMP 06/13/2021 Comment: DVH / BSO  6-23-21  SpO2 99%   BMI 24.33 kg/m²        General: AAO x3, no apparent distress  HEENT: no cervical LAD, no thyroid masses appreciated  CV: RRR, no m/r/g  Pulm: Lungs CTAB, no crackles, no wheezes  Abd: s/NT/ND +BS  Extremities: no c/c/e    Labs:     Lab Results   Component Value Date    WBC 6.55 10/07/2024    HGB 14.7 10/07/2024    HCT 43.6 10/07/2024    MCV 92 10/07/2024     10/07/2024     Sodium   Date Value Ref Range Status   10/07/2024 139 136 - 145 mmol/L Final     Potassium   Date Value Ref Range Status   10/07/2024 3.7 3.5 - 5.1 mmol/L Final     Chloride   Date Value Ref Range Status   10/07/2024 106 95 - 110 mmol/L Final     CO2   Date Value Ref Range Status   10/07/2024 24 23 - 29 mmol/L Final     Glucose   Date Value Ref Range Status   10/07/2024 93 70 - 110 mg/dL Final     BUN   Date Value Ref Range Status   10/07/2024 14 6 - 20 mg/dL Final     Creatinine   Date Value Ref Range Status   10/07/2024 1.0 0.5 - 1.4 mg/dL Final     Calcium   Date Value Ref Range Status   10/07/2024 8.9 8.7 - 10.5 mg/dL Final     Total Protein   Date Value Ref Range Status   10/07/2024 6.6 6.0 - 8.4 g/dL Final     Albumin   Date Value Ref Range Status   10/07/2024 3.8 3.5 - 5.2 g/dL Final     Total Bilirubin   Date Value Ref Range Status   10/07/2024 0.6 0.1 - 1.0 mg/dL Final     Comment:     For infants and newborns, interpretation of results should be based  on gestational age, weight and in agreement with clinical  observations.    Premature Infant recommended reference ranges:  Up to 24 hours.............<8.0 mg/dL  Up to 48 hours............<12.0 mg/dL  3-5 days..................<15.0 mg/dL  6-29 days.................<15.0 " mg/dL       Alkaline Phosphatase   Date Value Ref Range Status   10/07/2024 41 (L) 55 - 135 U/L Final     AST   Date Value Ref Range Status   10/07/2024 20 10 - 40 U/L Final     ALT   Date Value Ref Range Status   10/07/2024 23 10 - 44 U/L Final     Anion Gap   Date Value Ref Range Status   10/07/2024 9 8 - 16 mmol/L Final     eGFR if    Date Value Ref Range Status   10/05/2021 >60 >60 mL/min/1.73 m^2 Final     eGFR if non    Date Value Ref Range Status   10/05/2021 >60 >60 mL/min/1.73 m^2 Final     Comment:     Calculation used to obtain the estimated glomerular filtration  rate (eGFR) is the CKD-EPI equation.        Lab Results   Component Value Date    HGBA1C 5.0 10/07/2024     Lab Results   Component Value Date    LDLCALC 65.0 10/07/2024     Lab Results   Component Value Date    TSH 1.694 08/26/2024         Assessment/Plan     Adrienne was seen today for annual exam.    Diagnoses and all orders for this visit:    Visit for annual health examination  History and physical exam completed.  Health maintenance reviewed as above.  Repeat manual BP back at her usual baseline in borderline range, discussed low Na diet and regular exercise  Will send for annual labs today  Tdap at pharmacy when she gets back into town.  -     CBC Auto Differential; Future  -     Comprehensive Metabolic Panel; Future  -     Hemoglobin A1C; Future  -     Lipid Panel; Future  -     TSH; Future    Left elbow pain  As per HPI  Has not had improvement c PRP  Can f/u c Hand clinic for further eval  -     Ambulatory referral/consult to Hand Surgery; Future    HM as above  RTC in 1 year, sooner if needed.  Fasting labs today.    Christine Venegas MD  Department of Internal Medicine - [facility]  08/07/2025           [1]   Social History  Tobacco Use    Smoking status: Never     Passive exposure: Never    Smokeless tobacco: Never   Substance Use Topics    Alcohol use: Not Currently     Alcohol/week: 0.0 standard  drinks of alcohol     Comment: socially    Drug use: No

## 2025-08-07 NOTE — PROGRESS NOTES
"  Outpatient Rehab    Physical Therapy Visit    Patient Name: Adrienne Andrew  MRN: [id number]  YOB: 1975  Encounter Date: 8/6/2025    Therapy Diagnosis:   Encounter Diagnoses   Name Primary?    Decreased range of motion of left elbow Yes    Muscle weakness of left upper extremity      Physician: Martha Valderrama MD    Physician Orders: Eval and Treat  Medical Diagnosis: Pain in left elbow  Lateral epicondylitis, left elbow  Surgical Diagnosis: Not applicable for this Episode   Surgical Date: Not applicable for this Episode  Days Since Last Surgery: Not applicable for this Episode    Visit # / Visits Authorized:  1 / 10  Insurance Authorization Period: 7/31/2025 to 12/31/2025  Date of Evaluation: 7/31/2025  Plan of Care Certification: 8/3/2025 to 10/15/2025      Time In: 1107   Time Out: 1201  Total Time (in minutes): 54   Total Billable Time (in minutes): 54    Precautions:         Subjective   Adrienne reports she has not noticed any changes in her elbow pain. Extension remains painful at the end range..         Objective            Treatment:  Therapeutic Exercise  TE 1: Active elbow range of motion x 25  TE 2: Active wrist flexion/extension x 30  TE 3: Active radial/ulnar deviation x 30  Manual Therapy  MT 1: Medial/lateral UHJ mobilizations grade II/III  MT 2: Proximal radial ulnar joint mobilizations grade III  MT 3: Distal radial ulnar joint mobilizations grade III  MT 4: Passive elbow/foream ROM  Balance/Neuromuscular Re-Education  NMR 1: Scapula depression x 30 with 5" holds  NMR 2: Scapula retractions x 30 with 5" holds  NMR 3: SA ball push x 30  NMR 4: Isometric wrist extension 2 x 10, 5" holds    Time Entry(in minutes):  Manual Therapy Time Entry: 24  Neuromuscular Re-Education Time Entry: 16  Therapeutic Exercise Time Entry: 14    Assessment & Plan   Assessment: Adrienne continues to experience discomfort with elbow extension and forearm supination. Elbow range of motion improved " following manual interventions yet forearm supination remained painful. Isometric wrist extension was more tolerable compared to the initial evaluation.        The patient will continue to benefit from skilled outpatient physical therapy in order to address the deficits listed in the problem list on the initial evaluation, provide patient and family education, and maximize the patients level of independence in the home and community environments.     The patient's spiritual, cultural, and educational needs were considered, and the patient is agreeable to the plan of care and goals.           Plan: Normalize elbow and forearm range of motion.    Goals:   Active       Long term goals       Patient will demonstrate <10% deficit of wrist extension strength when compared to the right.        Start:  08/03/25    Expected End:  10/12/25            Patient will demonstrate <10%  strength deficit when compared to the right.        Start:  08/03/25    Expected End:  10/12/25            Patient will return to exercising in the gym using dumbbells without elbow pain.        Start:  08/03/25    Expected End:  10/12/25            Patient will improve her FOTO score to >62 to represent a change in functional status.        Start:  08/03/25    Expected End:  10/12/25               Short term goals       Patient will improve her left elbow extension equal to her right.        Start:  08/03/25    Expected End:  09/07/25            Patient will demonstrate full left forearm supination.        Start:  08/03/25    Expected End:  09/07/25            Patient will improve her left middle and low trap strength to at least 4/5.        Start:  08/03/25    Expected End:  09/07/25            Patient will improve her wrist extension strength to within 30% of her right.        Start:  08/03/25    Expected End:  09/07/25                Idris Vidales, PT, DPT, SCS  Board Certified Sports Certified Specialist

## 2025-08-14 ENCOUNTER — CLINICAL SUPPORT (OUTPATIENT)
Dept: REHABILITATION | Facility: OTHER | Age: 50
End: 2025-08-14
Payer: COMMERCIAL

## 2025-08-14 DIAGNOSIS — M25.622 DECREASED RANGE OF MOTION OF LEFT ELBOW: Primary | ICD-10-CM

## 2025-08-14 DIAGNOSIS — M62.81 MUSCLE WEAKNESS OF LEFT UPPER EXTREMITY: ICD-10-CM

## 2025-08-14 PROCEDURE — 97112 NEUROMUSCULAR REEDUCATION: CPT | Mod: PN

## 2025-08-14 PROCEDURE — 97140 MANUAL THERAPY 1/> REGIONS: CPT | Mod: PN

## 2025-08-20 DIAGNOSIS — Z13.21 MALNUTRITION SCREEN: ICD-10-CM

## 2025-08-20 DIAGNOSIS — Z00.00 ROUTINE MEDICAL EXAM: ICD-10-CM

## 2025-08-20 DIAGNOSIS — N95.1 MENOPAUSAL STATE: Primary | ICD-10-CM

## 2025-08-21 ENCOUNTER — CLINICAL SUPPORT (OUTPATIENT)
Dept: REHABILITATION | Facility: OTHER | Age: 50
End: 2025-08-21
Payer: COMMERCIAL

## 2025-08-21 DIAGNOSIS — M62.81 MUSCLE WEAKNESS OF LEFT UPPER EXTREMITY: ICD-10-CM

## 2025-08-21 DIAGNOSIS — M25.622 DECREASED RANGE OF MOTION OF LEFT ELBOW: Primary | ICD-10-CM

## 2025-08-21 PROCEDURE — 97110 THERAPEUTIC EXERCISES: CPT | Mod: PN

## 2025-08-21 PROCEDURE — 97112 NEUROMUSCULAR REEDUCATION: CPT | Mod: PN

## 2025-08-21 PROCEDURE — 97140 MANUAL THERAPY 1/> REGIONS: CPT | Mod: PN

## 2025-08-28 ENCOUNTER — CLINICAL SUPPORT (OUTPATIENT)
Dept: REHABILITATION | Facility: OTHER | Age: 50
End: 2025-08-28
Payer: COMMERCIAL

## 2025-08-28 DIAGNOSIS — M25.622 DECREASED RANGE OF MOTION OF LEFT ELBOW: Primary | ICD-10-CM

## 2025-08-28 DIAGNOSIS — M62.81 MUSCLE WEAKNESS OF LEFT UPPER EXTREMITY: ICD-10-CM

## 2025-08-28 PROCEDURE — 97140 MANUAL THERAPY 1/> REGIONS: CPT | Mod: PN

## 2025-08-28 PROCEDURE — 97112 NEUROMUSCULAR REEDUCATION: CPT | Mod: PN

## 2025-08-28 PROCEDURE — 97110 THERAPEUTIC EXERCISES: CPT | Mod: PN

## (undated) DEVICE — SET TRI-LUMEN FILTERED TUBE

## (undated) DEVICE — SOL 9P NACL IRR PIC IL

## (undated) DEVICE — KIT WING PAD POSITIONING

## (undated) DEVICE — DRAPE STERI INSTRUMENT 1018

## (undated) DEVICE — PORT ACCESS 8MM W/120MM LOW

## (undated) DEVICE — POSITIONER IV ARMBOARD FOAM

## (undated) DEVICE — SUT MCRYL PLUS 4-0 PS2 27IN

## (undated) DEVICE — SEE MEDLINE ITEM 157117

## (undated) DEVICE — SOL IRR NACL .9% 3000ML

## (undated) DEVICE — BLADE SHAVER 4.5 6/BX

## (undated) DEVICE — OCCLUDER COLPO-PNEUMO STERILE

## (undated) DEVICE — SUPPORT SLING SHOT II MEDIUM

## (undated) DEVICE — COVER TIP CURVED SCISSORS XI

## (undated) DEVICE — DRAPE STERI U-SHAPED 47X51IN

## (undated) DEVICE — GLOVE SURGEON SYN PF SZ 9

## (undated) DEVICE — ADHESIVE DERMABOND ADVANCED

## (undated) DEVICE — SEE MEDLINE ITEM 156923

## (undated) DEVICE — GLOVE BIOGEL SKINSENSE PI 6.0

## (undated) DEVICE — SEAL UNIVERSAL 5MM-8MM XI

## (undated) DEVICE — SOL CLEARIFY VISUALIZATION LAP

## (undated) DEVICE — UNDERGLOVES BIOGEL PI SIZE 7.5

## (undated) DEVICE — GLOVE BIOGEL SKINSENSE PI 7.0

## (undated) DEVICE — INSERT CUSHIONPRONE VIEW LARGE

## (undated) DEVICE — ELECTRODE REM PLYHSV RETURN 9

## (undated) DEVICE — GLOVE ORTHO PF SZ 8.5

## (undated) DEVICE — DRESSING XEROFORM 1X8IN

## (undated) DEVICE — PENCIL ELECTROSURG HOLST W/BLD

## (undated) DEVICE — GAUZE SPONGE 4'X4 12 PLY

## (undated) DEVICE — SEE MEDLINE ITEM 152530

## (undated) DEVICE — JELLY SURGILUBE 5GR

## (undated) DEVICE — Device

## (undated) DEVICE — DEVICE ANC SW STAT FOLEY 6-24

## (undated) DEVICE — BELLOW CANN HEMOBLAST 1.65GR

## (undated) DEVICE — DRAPE COLUMN DAVINCI XI

## (undated) DEVICE — PAD ELECTRODE STER 1.5X3

## (undated) DEVICE — DRAPE ARM DAVINCI XI

## (undated) DEVICE — NDL INSUF ULTRA VERESS 120MM

## (undated) DEVICE — SEE MEDLINE ITEM 153151

## (undated) DEVICE — TUBE SET INFLOW/OUTFLOW

## (undated) DEVICE — SUT VICRYL PLUS 0 CT1 36IN

## (undated) DEVICE — SOL NS 1000CC

## (undated) DEVICE — TROCAR ENDOPATH XCEL 5X100MM

## (undated) DEVICE — ADHESIVE MASTISOL VIAL 48/BX

## (undated) DEVICE — SCRUB 10% POVIDONE IODINE 4OZ

## (undated) DEVICE — GLOVE BIOGEL SKINSENSE PI 6.5

## (undated) DEVICE — SUT VICRYL PLUS 3-0 SH 18IN

## (undated) DEVICE — GOWN SMART IMP BREATHABLE XXLG

## (undated) DEVICE — SOL WATER STRL IRR 1000ML

## (undated) DEVICE — APPLICATOR CHLORAPREP ORN 26ML

## (undated) DEVICE — CLOSURE SKIN STERI STRIP 1/2X4

## (undated) DEVICE — IRRIGATOR ENDOSCOPY DISP.

## (undated) DEVICE — SOL ELECTROLUBE ANTI-STIC

## (undated) DEVICE — CLOSURE SKIN 1X5 STERI-STRIP

## (undated) DEVICE — SEE MEDLINE ITEM 157110

## (undated) DEVICE — CANNULA ENDOPATH XCEL 5X100MM

## (undated) DEVICE — SUT VICRYL 0 27 CT-2

## (undated) DEVICE — OBTURATOR BLADELESS 8MM XI CLR

## (undated) DEVICE — APPLICATOR HEMOBLAST LAPSCP

## (undated) DEVICE — TAPE MEDIPORE 4IN X 2YDS

## (undated) DEVICE — SUT 0 V-LOC GR GS-21 TAPER

## (undated) DEVICE — SUT 1 36IN PDS II VIO MONO

## (undated) DEVICE — PAD SHOULDER CARE POLAR

## (undated) DEVICE — PACK LAPAROSCOPY BAPTIST

## (undated) DEVICE — KIT SHOULDER POSITIONER SPIDER

## (undated) DEVICE — NDL 18GA X1 1/2 REG BEVEL

## (undated) DEVICE — SYR 10CC LUER LOCK

## (undated) DEVICE — PAD ABD 8X10 STERILE

## (undated) DEVICE — SEE MEDLINE ITEM 146420